# Patient Record
Sex: FEMALE | Race: WHITE | NOT HISPANIC OR LATINO | Employment: OTHER | URBAN - METROPOLITAN AREA
[De-identification: names, ages, dates, MRNs, and addresses within clinical notes are randomized per-mention and may not be internally consistent; named-entity substitution may affect disease eponyms.]

---

## 2017-02-15 RX ORDER — TRAZODONE HYDROCHLORIDE 50 MG/1
TABLET ORAL
Qty: 30 TABLET | Refills: 2 | Status: SHIPPED | OUTPATIENT
Start: 2017-02-15 | End: 2017-06-08

## 2017-03-20 RX ORDER — NAPROXEN 500 MG/1
TABLET ORAL
Qty: 30 TABLET | Refills: 6 | Status: SHIPPED | OUTPATIENT
Start: 2017-03-20 | End: 2017-06-08

## 2017-04-12 DIAGNOSIS — N95.2 ATROPHIC VAGINITIS: Primary | ICD-10-CM

## 2017-04-13 ENCOUNTER — TELEPHONE (OUTPATIENT)
Dept: INTERNAL MEDICINE | Facility: CLINIC | Age: 57
End: 2017-04-13

## 2017-04-13 NOTE — TELEPHONE ENCOUNTER
----- Message from León Sanabria MD sent at 4/12/2017  7:23 PM EDT -----  I changed her to premarin vaginal. Thanks.  ----- Message -----     From: Susan Hendrickson MA     Sent: 4/7/2017  11:01 AM       To: León Sanabria MD        ----- Message -----     From: Terrie Whitehead     Sent: 4/6/2017   9:36 AM       To: Joanie Arauz [address]    PATIENT NEEDS HER ESTRACE CHANGED TO PREMARINE DUE TO INSURANCE TO PAYING.  SHE CAN NOT AFFORD THE ESTRACE OUT OF POCKET.      [place] IN [place]  [phone]    LEFT MESSAGE FOR PT,

## 2017-04-17 ENCOUNTER — TELEPHONE (OUTPATIENT)
Dept: INTERNAL MEDICINE | Facility: CLINIC | Age: 57
End: 2017-04-17

## 2017-04-17 NOTE — TELEPHONE ENCOUNTER
Patient advised as per below.  Suggested that patient contact customer service with her insurance company to see if she can personally appeal to them for coverage of the vaginal cream.  Patient voiced understanding and states that she will attempt this.    ----- Message from Ynes Peña sent at 2017 11:34 AM EDT -----  Regarding: FW: SUBSTITUTE  Contact: [phone]      ----- Message -----     From: León Sanabria MD     Sent: 2017  11:22 AM       To: Susan Hendrickson MA  Subject: RE: SUBSTITUTE                                   It doesn't sound like her insurance is covering any estrogen vaginal cream, unfortunately.  The only option would be oral hormones, but I don't really want to do this, especially not without having a face to face discussion on risks and benefits.  Thanks.  ----- Message -----     From: Susan Hendrickson MA     Sent: 2017   4:34 PM       To: León Sanabria MD  Subject: FW: SUBSTITUTE                                       ----- Message -----     From: Melissa Medrano     Sent: 2017   4:23 PM       To: Susan Hendrickson MA  Subject: SUBSTITUTE                                       :  64  [place] PATIENT    PATIENT SAID THE PREMARIN VAGINAL CREAM, 0.625 MG/GN,  COST $300. SHE IS UNABLE TO PAY THIS. IS THERE A GENERIC FORM OF MED?

## 2017-05-08 ENCOUNTER — OFFICE VISIT (OUTPATIENT)
Dept: INTERNAL MEDICINE | Facility: CLINIC | Age: 57
End: 2017-05-08

## 2017-05-08 ENCOUNTER — HOSPITAL ENCOUNTER (OUTPATIENT)
Dept: GENERAL RADIOLOGY | Facility: HOSPITAL | Age: 57
Discharge: HOME | End: 2017-05-08
Attending: FAMILY MEDICINE | Admitting: FAMILY MEDICINE

## 2017-05-08 ENCOUNTER — TELEPHONE (OUTPATIENT)
Dept: INTERNAL MEDICINE | Facility: CLINIC | Age: 57
End: 2017-05-08

## 2017-05-08 VITALS
HEIGHT: 62 IN | DIASTOLIC BLOOD PRESSURE: 84 MMHG | WEIGHT: 170 LBS | TEMPERATURE: 98.7 F | HEART RATE: 80 BPM | SYSTOLIC BLOOD PRESSURE: 140 MMHG | BODY MASS INDEX: 31.28 KG/M2 | OXYGEN SATURATION: 97 %

## 2017-05-08 DIAGNOSIS — J01.00 ACUTE MAXILLARY SINUSITIS, RECURRENCE NOT SPECIFIED: ICD-10-CM

## 2017-05-08 DIAGNOSIS — M95.2 SKULL DEFECT: Primary | ICD-10-CM

## 2017-05-08 DIAGNOSIS — M95.2 SKULL DEFECT: ICD-10-CM

## 2017-05-08 PROCEDURE — 70260 X-RAY EXAM OF SKULL: CPT

## 2017-05-08 PROCEDURE — 99213 OFFICE O/P EST LOW 20 MIN: CPT | Performed by: FAMILY MEDICINE

## 2017-05-08 RX ORDER — AMOXICILLIN 875 MG/1
875 TABLET, COATED ORAL 2 TIMES DAILY
Qty: 20 TABLET | Refills: 0 | Status: SHIPPED | OUTPATIENT
Start: 2017-05-08 | End: 2017-06-08

## 2017-06-01 ENCOUNTER — TRANSCRIBE ORDERS (OUTPATIENT)
Dept: INTERNAL MEDICINE | Facility: CLINIC | Age: 57
End: 2017-06-01

## 2017-06-01 DIAGNOSIS — Z13.9 SCREENING: Primary | ICD-10-CM

## 2017-06-07 ENCOUNTER — HOSPITAL ENCOUNTER (OUTPATIENT)
Dept: MAMMOGRAPHY | Facility: HOSPITAL | Age: 57
Discharge: HOME | End: 2017-06-07
Attending: FAMILY MEDICINE | Admitting: FAMILY MEDICINE

## 2017-06-07 DIAGNOSIS — Z13.9 SCREENING: ICD-10-CM

## 2017-06-07 PROCEDURE — 77063 BREAST TOMOSYNTHESIS BI: CPT

## 2017-06-07 PROCEDURE — G0202 SCR MAMMO BI INCL CAD: HCPCS

## 2017-06-08 ENCOUNTER — OFFICE VISIT (OUTPATIENT)
Dept: INTERNAL MEDICINE | Facility: CLINIC | Age: 57
End: 2017-06-08

## 2017-06-08 VITALS
SYSTOLIC BLOOD PRESSURE: 140 MMHG | WEIGHT: 169 LBS | DIASTOLIC BLOOD PRESSURE: 80 MMHG | BODY MASS INDEX: 31.1 KG/M2 | TEMPERATURE: 97.9 F | HEIGHT: 62 IN | HEART RATE: 82 BPM

## 2017-06-08 DIAGNOSIS — N95.2 ATROPHIC VAGINITIS: ICD-10-CM

## 2017-06-08 DIAGNOSIS — R73.03 PREDIABETES: ICD-10-CM

## 2017-06-08 DIAGNOSIS — G47.00 INSOMNIA, UNSPECIFIED TYPE: ICD-10-CM

## 2017-06-08 DIAGNOSIS — F32.A DEPRESSION, UNSPECIFIED DEPRESSION TYPE: Primary | ICD-10-CM

## 2017-06-08 DIAGNOSIS — R91.8 PULMONARY NODULES: ICD-10-CM

## 2017-06-08 DIAGNOSIS — J30.9 ATOPIC RHINITIS: ICD-10-CM

## 2017-06-08 PROCEDURE — 99214 OFFICE O/P EST MOD 30 MIN: CPT | Performed by: FAMILY MEDICINE

## 2017-06-08 RX ORDER — TRAZODONE HYDROCHLORIDE 50 MG/1
TABLET ORAL
Qty: 30 TABLET | Refills: 6 | Status: SHIPPED | OUTPATIENT
Start: 2017-06-08 | End: 2018-07-08 | Stop reason: SDUPTHER

## 2017-06-08 RX ORDER — MOMETASONE FUROATE 50 UG/1
SPRAY, METERED NASAL
Qty: 17 G | Refills: 6 | Status: SHIPPED | OUTPATIENT
Start: 2017-06-08 | End: 2017-08-30

## 2017-06-08 RX ORDER — BUPROPION HYDROCHLORIDE 300 MG/1
TABLET ORAL
Qty: 30 TABLET | Refills: 6 | Status: SHIPPED | OUTPATIENT
Start: 2017-06-08 | End: 2018-01-03 | Stop reason: SDUPTHER

## 2017-06-08 RX ORDER — CHOLECALCIFEROL (VITAMIN D3) 125 MCG
1 CAPSULE ORAL DAILY
COMMUNITY
End: 2022-01-19

## 2017-06-08 RX ORDER — NAPROXEN 500 MG/1
TABLET ORAL
Qty: 60 TABLET | Refills: 0 | Status: SHIPPED | OUTPATIENT
Start: 2017-06-08 | End: 2017-08-30 | Stop reason: SDUPTHER

## 2017-06-08 NOTE — PROGRESS NOTES
Subjective     Lilliam Portillo is a 56 y.o. female, who presents with a chief complaint of   Chief Complaint   Patient presents with   • Diabetes   • Depression       Diabetes     Depression        1. Depression.  Pt reports she is doing well with current treatment.  Denies SI.    2. Insomnia.  Trazodone is helping.  Able to sleep well with this.    3. Atrophic vaginitis.  Pt reports that the estrogen cream has resolved her symptoms.  She had her mammogram yesterday, which was normal.    The following portions of the patient's history were reviewed and updated as appropriate: allergies, current medications, past family history, past medical history, past social history, past surgical history and problem list.    Allergies: Review of patient's allergies indicates no known allergies.    Review of Systems   Constitutional: Negative.    HENT: Negative.    Eyes: Negative.    Respiratory: Negative.    Cardiovascular: Negative.    Gastrointestinal: Negative.    Endocrine: Negative.    Genitourinary: Negative.    Musculoskeletal: Negative.    Skin: Negative.    Allergic/Immunologic: Positive for environmental allergies.   Neurological: Negative.    Hematological: Negative.    Psychiatric/Behavioral: Positive for sleep disturbance.       Objective     Wt Readings from Last 3 Encounters:   06/08/17 169 lb (76.7 kg)   05/08/17 170 lb (77.1 kg)   12/12/16 163 lb (73.9 kg)     Temp Readings from Last 3 Encounters:   06/08/17 97.9 °F (36.6 °C)   05/08/17 98.7 °F (37.1 °C)   12/12/16 98.1 °F (36.7 °C)     BP Readings from Last 3 Encounters:   06/08/17 140/80   05/08/17 140/84   12/12/16 100/60     Pulse Readings from Last 3 Encounters:   06/08/17 82   05/08/17 80   12/12/16 78     Body mass index is 30.91 kg/(m^2).    Physical Exam   Constitutional: She is oriented to person, place, and time. She appears well-developed and well-nourished.   HENT:   Head: Normocephalic.   Mouth/Throat: Oropharynx is clear and moist.   Eyes:  Conjunctivae and EOM are normal. Pupils are equal, round, and reactive to light.   Neck: Normal range of motion. Neck supple. No thyromegaly present.   Cardiovascular: Normal rate, regular rhythm and normal heart sounds.    Pulmonary/Chest: Effort normal and breath sounds normal.   Abdominal: Soft. Bowel sounds are normal. There is no hepatosplenomegaly.   Musculoskeletal: Normal range of motion. She exhibits no edema.   Lymphadenopathy:     She has no cervical adenopathy.   Neurological: She is alert and oriented to person, place, and time.   Skin: Skin is warm and dry. No rash noted.   Psychiatric: She has a normal mood and affect. Her behavior is normal.   Vitals reviewed.          Assessment/Plan   Lilliam was seen today for diabetes and depression.    Diagnoses and all orders for this visit:    Depression, unspecified depression type    Insomnia, unspecified type    Atopic rhinitis  -     ciclesonide (OMNARIS) 50 MCG/ACT nasal spray; 2 sprays by Each Nare route Daily.    Atrophic vaginitis    Prediabetes    Pulmonary nodules    1. Depression.  Controlled.  Continue same.    2. Insomnia.  Benefiting from trazodone.  Continue same.    3. Allergic rhinitis. Continue nasal steroid.  The Nasonex doesn't work as well as Omnaris.  She has been on Flonase as well; Omnaris was the most effective for her.    4. Atrophic vaginitis.  Improved with vaginal estrogen.  Mammogram UTD.    5. Prediabetes.  Labs reviewed, which she brought in from her anti-aging/hormone physician, Dr. Shelton. A1c 5.8.  Continue lifestyle measures.    6. Pulmonary nodules.  Stable.  Repeat CT scan due 11/2017.      Current Outpatient Prescriptions:   •  albuterol (PROVENTIL) (2.5 MG/3ML) 0.083% nebulizer solution, Take 2.5 mg by nebulization every 4 (four) hours as needed for wheezing., Disp: 30 vial, Rfl: 12  •  buPROPion XL (WELLBUTRIN XL) 300 MG 24 hr tablet, TAKE 1 TABLET BY MOUTH EVERY DAY, Disp: 30 tablet, Rfl: 6  •  Cholecalciferol (VITAMIN  D3) 2000 UNITS tablet, Take 1 tablet by mouth Daily., Disp: , Rfl:   •  conjugated estrogens (PREMARIN) 0.625 MG/GM vaginal cream, 0.5 grams PV twice weekly., Disp: 30 g, Rfl: 3  •  cyclobenzaprine (FLEXERIL) 5 MG tablet, TAKE 1 TABLET BY MOUTH THREE TIMES DAILY AS NEEDED FOR MUSCLE SPASMS, Disp: 30 tablet, Rfl: 5  •  mometasone (NASONEX) 50 MCG/ACT nasal spray, SPRAY 2 SPRAYS IN EACH NOSTRIL DAILY, Disp: 17 g, Rfl: 6  •  Multiple Vitamin (MULTI VITAMIN) tablet, Take  by mouth Daily., Disp: , Rfl:   •  naproxen (NAPROSYN) 500 MG tablet, TAKE 1 TABLET BY MOUTH TWICE DAILY WITH FOOD, Disp: 60 tablet, Rfl: 0  •  Nutritional Supplements (DHEA PO), Take 1 tablet by mouth Daily., Disp: , Rfl:   •  ciclesonide (OMNARIS) 50 MCG/ACT nasal spray, 2 sprays by Each Nare route Daily., Disp: 1 each, Rfl: 11  •  traZODone (DESYREL) 50 MG tablet, TAKE 1 TABLET BY MOUTH EVERY NIGHT AT BEDTIME AS NEEDED, Disp: 30 tablet, Rfl: 6  Medications Discontinued During This Encounter   Medication Reason   • amoxicillin (AMOXIL) 875 MG tablet Therapy completed   • naproxen (NAPROSYN) 500 MG tablet Therapy completed   • traZODone (DESYREL) 50 MG tablet Therapy completed       Return in about 6 months (around 12/8/2017).

## 2017-08-30 ENCOUNTER — OFFICE VISIT (OUTPATIENT)
Dept: INTERNAL MEDICINE | Facility: CLINIC | Age: 57
End: 2017-08-30

## 2017-08-30 VITALS
HEIGHT: 62 IN | HEART RATE: 76 BPM | TEMPERATURE: 98.4 F | OXYGEN SATURATION: 99 % | BODY MASS INDEX: 32.2 KG/M2 | WEIGHT: 175 LBS | DIASTOLIC BLOOD PRESSURE: 70 MMHG | SYSTOLIC BLOOD PRESSURE: 120 MMHG

## 2017-08-30 DIAGNOSIS — M54.16 LUMBAR RADICULOPATHY, ACUTE: Primary | ICD-10-CM

## 2017-08-30 DIAGNOSIS — M62.838 NECK MUSCLE SPASM: ICD-10-CM

## 2017-08-30 DIAGNOSIS — J45.20 MILD INTERMITTENT ASTHMA WITHOUT COMPLICATION: ICD-10-CM

## 2017-08-30 PROCEDURE — 99213 OFFICE O/P EST LOW 20 MIN: CPT | Performed by: FAMILY MEDICINE

## 2017-08-30 RX ORDER — PREDNISONE 10 MG/1
40 TABLET ORAL DAILY
Qty: 20 TABLET | Refills: 0 | Status: SHIPPED | OUTPATIENT
Start: 2017-08-30 | End: 2017-09-04

## 2017-08-30 RX ORDER — ALBUTEROL SULFATE 2.5 MG/3ML
SOLUTION RESPIRATORY (INHALATION)
Qty: 75 ML | Refills: 6 | Status: SHIPPED | OUTPATIENT
Start: 2017-08-30 | End: 2017-12-14

## 2017-08-30 RX ORDER — NAPROXEN 500 MG/1
500 TABLET ORAL 2 TIMES DAILY WITH MEALS
Qty: 60 TABLET | Refills: 0 | Status: SHIPPED | OUTPATIENT
Start: 2017-08-30 | End: 2017-10-08 | Stop reason: SDUPTHER

## 2017-08-30 RX ORDER — HYDROCODONE BITARTRATE AND ACETAMINOPHEN 5; 325 MG/1; MG/1
1-2 TABLET ORAL NIGHTLY PRN
Qty: 20 TABLET | Refills: 0 | Status: SHIPPED | OUTPATIENT
Start: 2017-08-30 | End: 2019-02-04

## 2017-08-30 RX ORDER — BACLOFEN 10 MG/1
10 TABLET ORAL 3 TIMES DAILY PRN
Qty: 30 TABLET | Refills: 0 | Status: SHIPPED | OUTPATIENT
Start: 2017-08-30 | End: 2017-09-06 | Stop reason: SDUPTHER

## 2017-08-30 NOTE — PROGRESS NOTES
Subjective     Lilliam Portillo is a 57 y.o. female, who presents with a chief complaint of   Chief Complaint   Patient presents with   • Hip Pain     Pain for a week that radiates into right foot       HPI     Pt presents with the complaint of one week of low back pain, radiating into the lateral side of the right leg, down the calf, and into the right foot. There is some tingling on the outside of the foot.  Denies weakness.  Denies bowel/bladder changes.  She has had back pain in the past and has done physical therapy.  She is doing her PT exercises but feels that they are aggravating the pain.  She is taking naproxen and cyclobenzaprine with minimal improvement.  She is having a hard time sleeping at night.    The following portions of the patient's history were reviewed and updated as appropriate: allergies, current medications, past family history, past medical history, past social history, past surgical history and problem list.     Allergies: Review of patient's allergies indicates no known allergies.    Review of Systems   Constitutional: Negative.    HENT: Negative.    Eyes: Negative.    Respiratory: Negative.    Cardiovascular: Negative.    Gastrointestinal: Negative.    Endocrine: Negative.    Genitourinary: Negative.    Musculoskeletal: Positive for back pain and myalgias. Negative for joint swelling.   Neurological: Positive for numbness. Negative for weakness.   Hematological: Negative.    Psychiatric/Behavioral: Positive for sleep disturbance.       Objective     Wt Readings from Last 3 Encounters:   08/30/17 175 lb (79.4 kg)   06/08/17 169 lb (76.7 kg)   05/08/17 170 lb (77.1 kg)     Temp Readings from Last 3 Encounters:   08/30/17 98.4 °F (36.9 °C)   06/08/17 97.9 °F (36.6 °C)   05/08/17 98.7 °F (37.1 °C)     BP Readings from Last 3 Encounters:   08/30/17 120/70   06/08/17 140/80   05/08/17 140/84     Pulse Readings from Last 3 Encounters:   08/30/17 76   06/08/17 82   05/08/17 80     Body mass index  is 32.01 kg/(m^2).  SpO2 Readings from Last 3 Encounters:   08/30/17 99%   05/08/17 97%   12/12/16 98%       Physical Exam   Constitutional: She appears well-developed and well-nourished. No distress.   HENT:   Head: Normocephalic.   Eyes: Conjunctivae are normal.   Neck: Neck supple.   Pulmonary/Chest: Effort normal.   Musculoskeletal:        Lumbar back: She exhibits decreased range of motion (decreased extension) and pain. She exhibits no tenderness, no bony tenderness, no swelling, no edema and no deformity.   Neurological: She is alert. She has normal strength and normal reflexes. She displays normal reflexes. She exhibits normal muscle tone. Coordination and gait normal.   Skin: Skin is warm and dry. No rash noted. She is not diaphoretic.   Psychiatric: She has a normal mood and affect. Her behavior is normal.   Nursing note and vitals reviewed.        Assessment/Plan   Lilliam was seen today for hip pain.    Diagnoses and all orders for this visit:    Lumbar radiculopathy, acute  -     naproxen (NAPROSYN) 500 MG tablet; Take 1 tablet by mouth 2 (Two) Times a Day With Meals.  -     predniSONE (DELTASONE) 10 MG tablet; Take 4 tablets by mouth Daily for 5 days.  -     baclofen (LIORESAL) 10 MG tablet; Take 1 tablet by mouth 3 (Three) Times a Day As Needed for Muscle Spasms.  -     Ambulatory Referral to Physical Therapy Evaluate and treat  -     HYDROcodone-acetaminophen (NORCO) 5-325 MG per tablet; Take 1-2 tablets by mouth At Night As Needed for Moderate Pain  or Severe Pain .      NSAIDs, change muscle relaxant, 5 day course of oral steroids, start physical therapy.  F/U 2 weeks.  May need MRI.  Warning s/sxs and anticipatory guidance discussed.        Outpatient Medications Prior to Visit   Medication Sig Dispense Refill   • albuterol (PROVENTIL) (2.5 MG/3ML) 0.083% nebulizer solution INHALE CONTENTS OF 1 VIAL BY MOUTH VIA NEBULIZER EVERY 4 HOURS AS NEEDED FOR WHEEZING 75 mL 6   • buPROPion XL (WELLBUTRIN XL)  300 MG 24 hr tablet TAKE 1 TABLET BY MOUTH EVERY DAY 30 tablet 6   • Cholecalciferol (VITAMIN D3) 2000 UNITS tablet Take 1 tablet by mouth Daily.     • ciclesonide (OMNARIS) 50 MCG/ACT nasal spray 2 sprays by Each Nare route Daily. 1 each 11   • conjugated estrogens (PREMARIN) 0.625 MG/GM vaginal cream 0.5 grams PV twice weekly. 30 g 3   • Multiple Vitamin (MULTI VITAMIN) tablet Take  by mouth Daily.     • Nutritional Supplements (DHEA PO) Take 1 tablet by mouth Daily.     • traZODone (DESYREL) 50 MG tablet TAKE 1 TABLET BY MOUTH EVERY NIGHT AT BEDTIME AS NEEDED 30 tablet 6   • cyclobenzaprine (FLEXERIL) 5 MG tablet TAKE 1 TABLET BY MOUTH THREE TIMES DAILY AS NEEDED FOR MUSCLE SPASMS 30 tablet 5   • mometasone (NASONEX) 50 MCG/ACT nasal spray SPRAY 2 SPRAYS IN EACH NOSTRIL DAILY 17 g 6   • naproxen (NAPROSYN) 500 MG tablet TAKE 1 TABLET BY MOUTH TWICE DAILY WITH FOOD 60 tablet 0   • albuterol (PROVENTIL) (2.5 MG/3ML) 0.083% nebulizer solution Take 2.5 mg by nebulization every 4 (four) hours as needed for wheezing. 30 vial 12     No facility-administered medications prior to visit.      New Medications Ordered This Visit   Medications   • naproxen (NAPROSYN) 500 MG tablet     Sig: Take 1 tablet by mouth 2 (Two) Times a Day With Meals.     Dispense:  60 tablet     Refill:  0   • predniSONE (DELTASONE) 10 MG tablet     Sig: Take 4 tablets by mouth Daily for 5 days.     Dispense:  20 tablet     Refill:  0   • baclofen (LIORESAL) 10 MG tablet     Sig: Take 1 tablet by mouth 3 (Three) Times a Day As Needed for Muscle Spasms.     Dispense:  30 tablet     Refill:  0   • HYDROcodone-acetaminophen (NORCO) 5-325 MG per tablet     Sig: Take 1-2 tablets by mouth At Night As Needed for Moderate Pain  or Severe Pain .     Dispense:  20 tablet     Refill:  0     [unfilled]  Medications Discontinued During This Encounter   Medication Reason   • mometasone (NASONEX) 50 MCG/ACT nasal spray Therapy completed   • cyclobenzaprine  (FLEXERIL) 5 MG tablet    • naproxen (NAPROSYN) 500 MG tablet Reorder         Return in about 2 weeks (around 9/13/2017).

## 2017-09-06 ENCOUNTER — HOSPITAL ENCOUNTER (OUTPATIENT)
Dept: PHYSICAL THERAPY | Facility: HOSPITAL | Age: 57
Setting detail: THERAPIES SERIES
Discharge: HOME | End: 2017-09-06

## 2017-09-06 DIAGNOSIS — M54.16 LUMBAR RADICULOPATHY, ACUTE: ICD-10-CM

## 2017-09-06 DIAGNOSIS — M54.16 LUMBAR RADICULOPATHY: Primary | ICD-10-CM

## 2017-09-06 PROCEDURE — 97163 PT EVAL HIGH COMPLEX 45 MIN: CPT

## 2017-09-06 RX ORDER — BACLOFEN 10 MG/1
TABLET ORAL
Qty: 30 TABLET | Refills: 0 | Status: SHIPPED | OUTPATIENT
Start: 2017-09-06 | End: 2017-09-18 | Stop reason: SDUPTHER

## 2017-09-06 NOTE — THERAPY EVALUATION
Outpatient Physical Therapy Ortho Initial Evaluation  ERIN Orozco     Patient Name: Lilliam Portillo  : 1960  MRN: [id number]  Today's Date: 2017      Visit Date: 2017    Patient Active Problem List   Diagnosis   • Atopic rhinitis   • Bronchitis   • Degeneration of intervertebral disc of cervical region   • Degeneration of intervertebral disc of lumbar region   • Depression   • Insomnia   • Atrophic vaginitis   • Prediabetes   • Diverticulitis of colon   • Pulmonary nodules   • History of diabetes mellitus resolved following bariatric surgery   • History of bariatric surgery   • Diverticulosis   • Acute cystitis   • Acute maxillary sinusitis   • Skull defect   • Lumbar radiculopathy, acute        Past Medical History:   Diagnosis Date   • Depression         Past Surgical History:   Procedure Laterality Date   • CARPAL TUNNEL RELEASE     •  SECTION     • FOOT SURGERY     • HYSTERECTOMY     • SHOULDER SURGERY     • TRIGGER FINGER RELEASE         Visit Dx:     ICD-10-CM ICD-9-CM   1. Lumbar radiculopathy M54.16 724.4             Patient History       17 1200          History    Chief Complaint Difficulty Walking;Difficulty with daily activities;Muscle tenderness;Muscle weakness;Numbness;Pain;Tingling  -AS      Type of Pain Back pain;Lower Extremity / Leg   RLE  -AS      Date Current Problem(s) Began 17  -AS      Brief Description of Current Complaint Patient states that about 2 weeks ago she began having low back pain and numbness and tingling into her right LE to her foot. She states she has not had an MRI or x-rays. She has been given pain meds by her MD that she states have helped some. Her pain is increased with lying supine, lying prone, or sitting. She gets some relief with left sidelying or when standing on her RLE.  -AS      Onset Date- PT 17  -AS      Patient/Caregiver Goals Relieve pain;Return to prior level of function;Improve mobility;Improve strength  -AS       Patient's Rating of General Health Good  -AS      Occupation/sports/leisure activities [employer]   -AS      How has patient tried to help current problem? Heat  -AS      Pain     Pain Location Back;Leg  -AS      Pain at Present 6  -AS      Pain at Best 4  -AS      Pain at Worst 10  -AS      Pain Frequency Constant/continuous  -AS      Pain Description Aching;Numbness;Tingling  -AS      Daily Activities    Primary Language English  -AS      Are you able to read Yes  -AS      Are you able to write Yes  -AS      How does patient learn best? Listening;Reading  -AS      Teaching needs identified Home Exercise Program;Management of Condition  -AS      Patient is concerned about/has problems with Difficulty with self care (i.e. bathing, dressing, toileting:;Performing home management (household chores, shopping, care of dependents);Performing job responsibilities/community activities (work, school,;Performing sports, recreation, and play activities;Standing;Walking;Sitting  -AS      Does patient have problems with the following? None  -AS      Barriers to learning None  -AS      Pt Participated in POC and Goals Yes  -AS      Safety    Are you being hurt, hit, or frightened by anyone at home or in your life? No  -AS      Are you being neglected by a caregiver No  -AS        User Key  (r) = Recorded By, (t) = Taken By, (c) = Cosigned By    Initials Name Provider Type    AS Robinson Mcgovern, PT Physical Therapist                PT Ortho       09/06/17 1200    Precautions and Contraindications    Precautions/Limitations no known precautions/limitations  -AS    Posture/Observations    Alignment Options Lumbar lordosis  -AS    Lumbar lordosis Right:;Mild;Decreased  -AS    Lumbosacral Palpation    SI Right:;Tender  -AS    Piriformis Right:;Tender  -AS    Erector Spinae (Paraspinals) Right:;Tender  -AS    Lumbar/SI Special Tests    Slump Test (Neural Tension) Right:;Positive  -AS    SLR (Neural Tension)  Right:;Positive  -AS    Trunk    Flexion AROM WNL (0-80 degrees)  -AS    Extension AROM Deficit 100% limited  -AS    Lt Lat Flexion AROM Deficit 75% limited  -AS    Right Lateral Flexion AROM WNL (0-35 degrees)  -AS    Lt Rotation AROM Deficit 75% limited  -AS    Right Rotation AROM WNL (0-45 degrees)  -AS    Trunk    Trunk Flexion Gross Movement (3+/5) fair plus  -AS    Trunk Extension Gross Movement (3+/5) fair plus  -AS    Left Hip    Hip Extension Gross Movement (3+/5) fair plus  -AS    Hip ABduction Gross Movement (3+/5) fair plus  -AS    Right Hip    Hip Extension Gross Movement (3+/5) fair plus  -AS    Hip ABduction Gross Movement (3+/5) fair plus  -AS    Lower Extremity Flexibility    Hamstrings Right:;Mildly limited  -AS    Hip External Rotators Right:;Mildly limited  -AS      User Key  (r) = Recorded By, (t) = Taken By, (c) = Cosigned By    Initials Name Provider Type    AS Robinson Mcgovern, PT Physical Therapist                            Therapy Education       09/06/17 1250          Therapy Education    Given HEP;Pain management  -AS      Program New  -AS      How Provided Verbal;Demonstration;Written  -AS      Provided to Patient  -AS      Level of Understanding Teach back education performed;Verbalized;Demonstrated  -AS        User Key  (r) = Recorded By, (t) = Taken By, (c) = Cosigned By    Initials Name Provider Type    AS Robinson Mcgovern, PT Physical Therapist                PT OP Goals       09/06/17 1200       PT Short Term Goals    STG Date to Achieve 09/20/17  -AS     STG 1 Patient to be compliant with her initial HEP.  -AS     STG 2 Patient to improve her bilateral hip extensiona nd abduction strength to 4-/5.   -AS     STG 3 Patient to improve her trunk flexion and extension strength to 4-/5.  -AS     STG 4 Patient to report improved radicular symptomsin RLE by 25-50%.  -AS     STG 5 Patient to report worst pain on VAS of 4-5/10 with lying supine, prone, or sitting.  -AS     Long  Term Goals    LTG Date to Achieve 10/04/17  -AS     LTG 1 Patient to be compliant with her advanced HEP.  -AS     LTG 2 Patient to improve her bilateral hip extensiona nd abduction strength to 4/5.   -AS     LTG 3 Patient to improve her trunk flexion and extension strength to 4/5.  -AS     LTG 4 Patient to report improved radicular symptomsin RLE by 50-75%.  -AS     LTG 5 Patient to report worst pain on VAS of 2-3/10 with lying supine, prone, or sitting.  -AS     LTG 6 Patient to report improved function and decreased pain on Back Index by >10-15 points.  -AS     Time Calculation    PT Goal Re-Cert Due Date 10/04/17  -AS       User Key  (r) = Recorded By, (t) = Taken By, (c) = Cosigned By    Initials Name Provider Type    AS Robinson Mcgovern, PT Physical Therapist                PT Assessment/Plan       09/06/17 1200       PT Assessment    Functional Limitations Impaired gait;Impaired locomotion;Limitation in home management;Limitations in community activities;Performance in leisure activities;Performance in self-care ADL;Performance in sport activities;Performance in work activities  -AS     Impairments Gait;Impaired flexibility;Muscle strength;Pain;Range of motion  -AS     Assessment Comments Patient reports to outpatient PT with complaints of low back pain with radicular symptoms shireen her RLE to her foot. Patient has decreased bilateral hip extension and abduction strength, decreased trunk flexion and extension strength, and increased low back and RLE pain. Patient has decreased function due to the above. Patient was unable to tolerate much treatment today, including supine lumbar traction. Patient did report relief in her symptoms with manual LE distraction and left sidelying positional distraction.  -AS     Please refer to paper survey for additional self-reported information Yes  -AS     Rehab Potential Good  -AS     Patient/caregiver participated in establishment of treatment plan and goals Yes  -AS      Patient would benefit from skilled therapy intervention Yes  -AS     PT Plan    PT Frequency 2x/week  -AS     Predicted Duration of Therapy Intervention (days/wks) 2-4 weeks  -AS     Planned CPT's? PT RE-EVAL: 59868;PT THER PROC EA 15 MIN: 67443;PT THER ACT EA 15 MIN: 50631;PT MANUAL THERAPY EA 15 MIN: 90373;PT HOT OR COLD PACK TREAT [facility];PT ELECTRICAL STIM UNATTEND: ;PT ULTRASOUND EA 15 MIN: 87187;PT TRACTION LUMBAR: 36145  -AS       User Key  (r) = Recorded By, (t) = Taken By, (c) = Cosigned By    Initials Name Provider Type    AS Robinson Mcgovern PT Physical Therapist                  Exercises       09/06/17 1200          Subjective Pain    Able to rate subjective pain? yes  -AS      Pre-Treatment Pain Level 6  -AS      Post-Treatment Pain Level 5  -AS      Exercise 1    Exercise Name 1 Seated HS Stretch  -AS      Reps 1 10  -AS      Time (Seconds) 1 10 sec hold  -AS      Exercise 2    Exercise Name 2 Seated Piriformis Stretch  -AS      Reps 2 10  -AS      Time (Seconds) 2 10 sec hold  -AS      Exercise 3    Exercise Name 3 Left Positional Distraction  -AS      Time (Minutes) 3 3 min  -AS      Exercise 4    Exercise Name 4 Manual LE distraction  -AS      Reps 4 20  -AS      Time (Seconds) 4 5 sec holds  -AS        User Key  (r) = Recorded By, (t) = Taken By, (c) = Cosigned By    Initials Name Provider Type    AS Robinson Mcgovern PT Physical Therapist                              Outcome Measures       09/06/17 1200          Other Outcome Measure Tool Used    Other Outcome Measure Tool Comments Back Index - 31  -AS      Functional Assessment    Outcome Measure Options Other Outcome Measure  -AS        User Key  (r) = Recorded By, (t) = Taken By, (c) = Cosigned By    Initials Name Provider Type    AS Robinson Mcgovern PT Physical Therapist            Time Calculation:   Start Time: 1153  Stop Time: 1242  Time Calculation (min): 49 min     Therapy Charges for Today     Code Description Service  Date Service Provider Modifiers Qty    [id number] HC PT EVAL HIGH COMPLEXITY 4 9/6/2017 Robinson Mcgovern, PT GP 1          PT G-Codes  Outcome Measure Options: Other Outcome Measure         Robinson Mcgovern, PT  9/6/2017

## 2017-09-07 ENCOUNTER — HOSPITAL ENCOUNTER (OUTPATIENT)
Dept: PHYSICAL THERAPY | Facility: HOSPITAL | Age: 57
Setting detail: THERAPIES SERIES
Discharge: HOME | End: 2017-09-07

## 2017-09-07 DIAGNOSIS — M54.16 LUMBAR RADICULOPATHY: Primary | ICD-10-CM

## 2017-09-07 PROCEDURE — 97140 MANUAL THERAPY 1/> REGIONS: CPT

## 2017-09-07 PROCEDURE — G0283 ELEC STIM OTHER THAN WOUND: HCPCS

## 2017-09-07 NOTE — THERAPY TREATMENT NOTE
Outpatient Physical Therapy Ortho Treatment Note  ERIN Orozco     Patient Name: Lilliam Portillo  : 1960  MRN: [id number]  Today's Date: 2017      Visit Date: 2017    Visit Dx:    ICD-10-CM ICD-9-CM   1. Lumbar radiculopathy M54.16 724.4       Patient Active Problem List   Diagnosis   • Atopic rhinitis   • Bronchitis   • Degeneration of intervertebral disc of cervical region   • Degeneration of intervertebral disc of lumbar region   • Depression   • Insomnia   • Atrophic vaginitis   • Prediabetes   • Diverticulitis of colon   • Pulmonary nodules   • History of diabetes mellitus resolved following bariatric surgery   • History of bariatric surgery   • Diverticulosis   • Acute cystitis   • Acute maxillary sinusitis   • Skull defect   • Lumbar radiculopathy, acute        Past Medical History:   Diagnosis Date   • Depression         Past Surgical History:   Procedure Laterality Date   • CARPAL TUNNEL RELEASE     •  SECTION     • FOOT SURGERY     • HYSTERECTOMY     • SHOULDER SURGERY     • TRIGGER FINGER RELEASE               PT Ortho       17 1200    Precautions and Contraindications    Precautions/Limitations no known precautions/limitations  -AS    Posture/Observations    Alignment Options Lumbar lordosis  -AS    Lumbar lordosis Right:;Mild;Decreased  -AS    Lumbosacral Palpation    SI Right:;Tender  -AS    Piriformis Right:;Tender  -AS    Erector Spinae (Paraspinals) Right:;Tender  -AS    Lumbar/SI Special Tests    Slump Test (Neural Tension) Right:;Positive  -AS    SLR (Neural Tension) Right:;Positive  -AS    Trunk    Flexion AROM WNL (0-80 degrees)  -AS    Extension AROM Deficit 100% limited  -AS    Lt Lat Flexion AROM Deficit 75% limited  -AS    Right Lateral Flexion AROM WNL (0-35 degrees)  -AS    Lt Rotation AROM Deficit 75% limited  -AS    Right Rotation AROM WNL (0-45 degrees)  -AS    Trunk    Trunk Flexion Gross Movement (3+/5) fair plus  -AS    Trunk Extension Gross Movement  (3+/5) fair plus  -AS    Left Hip    Hip Extension Gross Movement (3+/5) fair plus  -AS    Hip ABduction Gross Movement (3+/5) fair plus  -AS    Right Hip    Hip Extension Gross Movement (3+/5) fair plus  -AS    Hip ABduction Gross Movement (3+/5) fair plus  -AS    Lower Extremity Flexibility    Hamstrings Right:;Mildly limited  -AS    Hip External Rotators Right:;Mildly limited  -AS      User Key  (r) = Recorded By, (t) = Taken By, (c) = Cosigned By    Initials Name Provider Type    AS Robinson Mcgovern, PT Physical Therapist                            PT Assessment/Plan       09/07/17 0700 09/06/17 1200    PT Assessment    Functional Limitations  Impaired gait;Impaired locomotion;Limitation in home management;Limitations in community activities;Performance in leisure activities;Performance in self-care ADL;Performance in sport activities;Performance in work activities  -AS    Impairments  Gait;Impaired flexibility;Muscle strength;Pain;Range of motion  -AS    Assessment Comments Patient continues to not toleratee many exercises. She did tolerate prone RLE manual distraction with and without prone press ups onto her elbows. Plan to initiate prone lumbar traction at her next treatment session.  -AS Patient reports to outpatient PT with complaints of low back pain with radicular symptoms shireen her RLE to her foot. Patient has decreased bilateral hip extension and abduction strength, decreased trunk flexion and extension strength, and increased low back and RLE pain. Patient has decreased function due to the above. Patient was unable to tolerate much treatment today, including supine lumbar traction. Patient did report relief in her symptoms with manual LE distraction and left sidelying positional distraction.  -AS    Please refer to paper survey for additional self-reported information  Yes  -AS    Rehab Potential  Good  -AS    Patient/caregiver participated in establishment of treatment plan and goals  Yes  -AS     Patient would benefit from skilled therapy intervention  Yes  -AS    PT Plan    PT Frequency  2x/week  -AS    Predicted Duration of Therapy Intervention (days/wks)  2-4 weeks  -AS    Planned CPT's?  PT RE-EVAL: 44403;PT THER PROC EA 15 MIN: 89763;PT THER ACT EA 15 MIN: 23953;PT MANUAL THERAPY EA 15 MIN: 12962;PT HOT OR COLD PACK TREAT MCARE;PT ELECTRICAL STIM UNATTEND: ;PT ULTRASOUND EA 15 MIN: 03728;PT TRACTION LUMBAR: 62847  -AS    PT Plan Comments Continue with current treatment plan as tolerated by the patient.  -AS       User Key  (r) = Recorded By, (t) = Taken By, (c) = Cosigned By    Initials Name Provider Type    AS Robinson Mcgovern, PT Physical Therapist                Modalities       09/07/17 0700          Moist Heat    MH Applied Yes  -AS      Location Lumbar    patient seated  -AS      Rx Minutes 15 mins  -AS      ELECTRICAL STIMULATION    Attended/Unattended Unattended  -AS      Stimulation Type IFC  -AS      Location/Electrode Placement/Other Lumbar  -AS      Rx Minutes 15 mins  -AS        User Key  (r) = Recorded By, (t) = Taken By, (c) = Cosigned By    Initials Name Provider Type    AS Robinson Mcgovern, PT Physical Therapist                Exercises       09/07/17 0700 09/06/17 1200       Subjective Comments    Subjective Comments Patient states she is feeling about the same this morning.  -AS      Subjective Pain    Able to rate subjective pain?  yes  -AS     Pre-Treatment Pain Level  6  -AS     Post-Treatment Pain Level  5  -AS     Exercise 1    Exercise Name 1 Seated HS Stretch  -AS Seated HS Stretch  -AS     Reps 1 10  -AS 10  -AS     Time (Seconds) 1 10 sec hold  -AS 10 sec hold  -AS     Exercise 2    Exercise Name 2 Seated Piriformis Stretch  -AS Seated Piriformis Stretch  -AS     Reps 2 10  -AS 10  -AS     Time (Seconds) 2 10 sec hold  -AS 10 sec hold  -AS     Exercise 3    Exercise Name 3 Left Positional Distraction  -AS Left Positional Distraction  -AS     Time (Minutes) 3 3  min  -AS 3 min  -AS     Exercise 4    Exercise Name 4 Manual LE distraction  -AS Manual LE distraction  -AS     Reps 4 20  -AS 20  -AS     Time (Seconds) 4 5 sec holds  -AS 5 sec holds  -AS     Exercise 5    Exercise Name 5 RLE distraction with prone ups onto elbows  -AS      Reps 5 10  -AS      Time (Seconds) 5 3 sec holds  -AS      Exercise 6    Exercise Name 6 RLE distraction without press ups  -AS      Reps 6 10  -AS      Time (Seconds) 6 3 sec holds  -AS        User Key  (r) = Recorded By, (t) = Taken By, (c) = Cosigned By    Initials Name Provider Type    AS Robinson Mcgovern, PT Physical Therapist                               PT OP Goals       09/06/17 1200       PT Short Term Goals    STG Date to Achieve 09/20/17  -AS     STG 1 Patient to be compliant with her initial HEP.  -AS     STG 2 Patient to improve her bilateral hip extensiona nd abduction strength to 4-/5.   -AS     STG 3 Patient to improve her trunk flexion and extension strength to 4-/5.  -AS     STG 4 Patient to report improved radicular symptomsin RLE by 25-50%.  -AS     STG 5 Patient to report worst pain on VAS of 4-5/10 with lying supine, prone, or sitting.  -AS     Long Term Goals    LTG Date to Achieve 10/04/17  -AS     LTG 1 Patient to be compliant with her advanced HEP.  -AS     LTG 2 Patient to improve her bilateral hip extensiona nd abduction strength to 4/5.   -AS     LTG 3 Patient to improve her trunk flexion and extension strength to 4/5.  -AS     LTG 4 Patient to report improved radicular symptomsin RLE by 50-75%.  -AS     LTG 5 Patient to report worst pain on VAS of 2-3/10 with lying supine, prone, or sitting.  -AS     LTG 6 Patient to report improved function and decreased pain on Back Index by >10-15 points.  -AS     Time Calculation    PT Goal Re-Cert Due Date 10/04/17  -AS       User Key  (r) = Recorded By, (t) = Taken By, (c) = Cosigned By    Initials Name Provider Type    AS Robinson Mcgovern, PT Physical Therapist                 Therapy Education       09/06/17 1250          Therapy Education    Given HEP;Pain management  -AS      Program New  -AS      How Provided Verbal;Demonstration;Written  -AS      Provided to Patient  -AS      Level of Understanding Teach back education performed;Verbalized;Demonstrated  -AS        User Key  (r) = Recorded By, (t) = Taken By, (c) = Cosigned By    Initials Name Provider Type    AS Robinson Mcgovern, PT Physical Therapist                Outcome Measures       09/06/17 1200          Other Outcome Measure Tool Used    Other Outcome Measure Tool Comments Back Index - 31  -AS      Functional Assessment    Outcome Measure Options Other Outcome Measure  -AS        User Key  (r) = Recorded By, (t) = Taken By, (c) = Cosigned By    Initials Name Provider Type    AS Robinson Mcgovern PT Physical Therapist            Time Calculation:   Start Time: 0700  Stop Time: 0748  Time Calculation (min): 48 min    Therapy Charges for Today     Code Description Service Date Service Provider Modifiers Qty    60816330669  PT MANUAL THERAPY EA 15 MIN 9/7/2017 Robinson Mcgovern, PT GP 1    [id number]  PT ELECTRICAL STIM UNATTENDED 9/7/2017 Robinson Mcgovern, PT  1                    Robinson Mcgovern, PT  9/7/2017

## 2017-09-11 ENCOUNTER — HOSPITAL ENCOUNTER (OUTPATIENT)
Dept: PHYSICAL THERAPY | Facility: HOSPITAL | Age: 57
Setting detail: THERAPIES SERIES
Discharge: HOME | End: 2017-09-11

## 2017-09-11 DIAGNOSIS — M54.16 LUMBAR RADICULOPATHY: Primary | ICD-10-CM

## 2017-09-11 PROCEDURE — 97012 MECHANICAL TRACTION THERAPY: CPT

## 2017-09-11 PROCEDURE — G0283 ELEC STIM OTHER THAN WOUND: HCPCS

## 2017-09-11 NOTE — THERAPY TREATMENT NOTE
Outpatient Physical Therapy Ortho Treatment Note  ERIN Orozco     Patient Name: Lilliam Portillo  : 1960  MRN: [id number]  Today's Date: 2017      Visit Date: 2017    Visit Dx:    ICD-10-CM ICD-9-CM   1. Lumbar radiculopathy M54.16 724.4       Patient Active Problem List   Diagnosis   • Atopic rhinitis   • Bronchitis   • Degeneration of intervertebral disc of cervical region   • Degeneration of intervertebral disc of lumbar region   • Depression   • Insomnia   • Atrophic vaginitis   • Prediabetes   • Diverticulitis of colon   • Pulmonary nodules   • History of diabetes mellitus resolved following bariatric surgery   • History of bariatric surgery   • Diverticulosis   • Acute cystitis   • Acute maxillary sinusitis   • Skull defect   • Lumbar radiculopathy, acute        Past Medical History:   Diagnosis Date   • Depression         Past Surgical History:   Procedure Laterality Date   • CARPAL TUNNEL RELEASE     •  SECTION     • FOOT SURGERY     • HYSTERECTOMY     • SHOULDER SURGERY     • TRIGGER FINGER RELEASE                               PT Assessment/Plan       17 0700       PT Assessment    Assessment Comments Performed prone lumber mechanical traction today and patient reported decreases in her radicular symptoms.   -AS     PT Plan    PT Plan Comments Continue with current treatment plan as tolerated by the patient.  -AS       User Key  (r) = Recorded By, (t) = Taken By, (c) = Cosigned By    Initials Name Provider Type    AS Robinson Mcgovern, PT Physical Therapist                Modalities       17 0700          Moist Heat    MH Applied Yes  -AS      Location Lumbar    patient seated  -AS      Rx Minutes 15 mins  -AS      ELECTRICAL STIMULATION    Attended/Unattended Unattended  -AS      Stimulation Type IFC  -AS      Location/Electrode Placement/Other Lumbar  -AS      Rx Minutes 15 mins  -AS      Traction 87981    Traction Type Lumbar  -AS      Rx Minutes 15  -AS       Duration Intermittent  -AS      Position Prone  -AS      Weight 50  -AS      Hold 60  -AS      Relax 10  -AS        User Key  (r) = Recorded By, (t) = Taken By, (c) = Cosigned By    Initials Name Provider Type    AS Robinson Mcgovern, PT Physical Therapist                Exercises       09/11/17 0700          Subjective Comments    Subjective Comments Patient states that she feels her back is feeling about the same this morning.   -AS      Exercise 1    Exercise Name 1 Seated HS Stretch  -AS      Reps 1 10  -AS      Time (Seconds) 1 10 sec hold  -AS      Exercise 2    Exercise Name 2 Seated Piriformis Stretch  -AS      Reps 2 10  -AS      Time (Seconds) 2 10 sec hold  -AS      Exercise 3    Exercise Name 3 --  -AS      Time (Minutes) 3 --  -AS      Exercise 4    Exercise Name 4 --  -AS      Reps 4 --  -AS      Time (Seconds) 4 --  -AS      Exercise 5    Exercise Name 5 --  -AS      Reps 5 --  -AS      Time (Seconds) 5 --  -AS      Exercise 6    Exercise Name 6 --  -AS      Reps 6 --  -AS      Time (Seconds) 6 --  -AS        User Key  (r) = Recorded By, (t) = Taken By, (c) = Cosigned By    Initials Name Provider Type    AS Robinson Mcgovern, PT Physical Therapist                                       Time Calculation:   Start Time: 0658  Stop Time: 0752  Time Calculation (min): 54 min    Therapy Charges for Today     Code Description Service Date Service Provider Modifiers Qty    22937368348  PT ELECTRICAL STIM UNATTENDED 9/11/2017 Robinson Mcgovern, PT  1    [id number]  PT TRACTION LUMBAR 9/11/2017 Robinson Mcgovern, PT GP 1                    Robinson Mcgovern, PT  9/11/2017

## 2017-09-13 ENCOUNTER — HOSPITAL ENCOUNTER (OUTPATIENT)
Dept: PHYSICAL THERAPY | Facility: HOSPITAL | Age: 57
Setting detail: THERAPIES SERIES
Discharge: HOME | End: 2017-09-13

## 2017-09-13 ENCOUNTER — OFFICE VISIT (OUTPATIENT)
Dept: INTERNAL MEDICINE | Facility: CLINIC | Age: 57
End: 2017-09-13

## 2017-09-13 VITALS
TEMPERATURE: 98.7 F | WEIGHT: 173.2 LBS | BODY MASS INDEX: 31.87 KG/M2 | SYSTOLIC BLOOD PRESSURE: 120 MMHG | DIASTOLIC BLOOD PRESSURE: 66 MMHG | OXYGEN SATURATION: 98 % | HEART RATE: 79 BPM | HEIGHT: 62 IN

## 2017-09-13 DIAGNOSIS — M54.16 LUMBAR RADICULOPATHY, ACUTE: Primary | ICD-10-CM

## 2017-09-13 DIAGNOSIS — M54.16 LUMBAR RADICULOPATHY: Primary | ICD-10-CM

## 2017-09-13 PROCEDURE — 97012 MECHANICAL TRACTION THERAPY: CPT

## 2017-09-13 PROCEDURE — G0283 ELEC STIM OTHER THAN WOUND: HCPCS

## 2017-09-13 PROCEDURE — 99213 OFFICE O/P EST LOW 20 MIN: CPT | Performed by: FAMILY MEDICINE

## 2017-09-13 RX ORDER — ESTERIFIED ESTROGEN AND METHYLTESTOSTERONE .625; 1.25 MG/1; MG/1
1 TABLET ORAL DAILY
COMMUNITY
End: 2018-06-14

## 2017-09-13 NOTE — THERAPY TREATMENT NOTE
Outpatient Physical Therapy Ortho Treatment Note  ERIN Orozco     Patient Name: Lilliam Portillo  : 1960  MRN: [id number]  Today's Date: 2017      Visit Date: 2017    Visit Dx:    ICD-10-CM ICD-9-CM   1. Lumbar radiculopathy M54.16 724.4       Patient Active Problem List   Diagnosis   • Atopic rhinitis   • Bronchitis   • Degeneration of intervertebral disc of cervical region   • Degeneration of intervertebral disc of lumbar region   • Depression   • Insomnia   • Atrophic vaginitis   • Prediabetes   • Diverticulitis of colon   • Pulmonary nodules   • History of diabetes mellitus resolved following bariatric surgery   • History of bariatric surgery   • Diverticulosis   • Acute cystitis   • Acute maxillary sinusitis   • Skull defect   • Lumbar radiculopathy, acute        Past Medical History:   Diagnosis Date   • Depression         Past Surgical History:   Procedure Laterality Date   • CARPAL TUNNEL RELEASE     •  SECTION     • FOOT SURGERY     • HYSTERECTOMY     • SHOULDER SURGERY     • TRIGGER FINGER RELEASE                               PT Assessment/Plan       17 0600       PT Assessment    Assessment Comments Progressed patient with lumbar mechanical traction this morning without complaints of increased pain or discomfort. Patient continues to have increased tolerance to exercises.  -AS     PT Plan    PT Plan Comments Continue with current treatment plan as tolerated by the patient.  -AS       User Key  (r) = Recorded By, (t) = Taken By, (c) = Cosigned By    Initials Name Provider Type    AS Robinson Mcgovern, PT Physical Therapist                Modalities       17 0600          Moist Heat    MH Applied Yes  -AS      Location Lumbar    patient seated  -AS      Rx Minutes 15 mins  -AS      ELECTRICAL STIMULATION    Attended/Unattended Unattended  -AS      Stimulation Type IFC  -AS      Location/Electrode Placement/Other Lumbar  -AS      Rx Minutes 15 mins  -AS       Traction 49181    Traction Type Lumbar  -AS      Rx Minutes 15  -AS      Duration Intermittent  -AS      Position Prone  -AS      Weight 75  -AS      Hold 60  -AS      Relax 10  -AS        User Key  (r) = Recorded By, (t) = Taken By, (c) = Cosigned By    Initials Name Provider Type    AS Robinson Mcgovern, PT Physical Therapist                Exercises       09/13/17 0600          Subjective Comments    Subjective Comments Patient states she did not notice a difference in her symptoms following the traction at her last treatment session.  -AS      Exercise 1    Exercise Name 1 Seated HS Stretch  -AS      Reps 1 10  -AS      Time (Seconds) 1 10 sec hold  -AS      Exercise 2    Exercise Name 2 Seated Piriformis Stretch  -AS      Reps 2 10  -AS      Time (Seconds) 2 10 sec hold  -AS        User Key  (r) = Recorded By, (t) = Taken By, (c) = Cosigned By    Initials Name Provider Type    AS Robinson Mcgovern, PT Physical Therapist                                       Time Calculation:   Start Time: 0640  Stop Time: 0734  Time Calculation (min): 54 min    Therapy Charges for Today     Code Description Service Date Service Provider Modifiers Qty    34225851456  PT TRACTION LUMBAR 9/13/2017 Robinson Mcgovern, PT GP 1    [id number]  PT ELECTRICAL STIM UNATTENDED 9/13/2017 Robinson Mcgovern, PT  1                    Robinson Mcgovern, PT  9/13/2017

## 2017-09-13 NOTE — PROGRESS NOTES
Subjective     Lilliam Portillo is a 57 y.o. female, who presents with a chief complaint of   Chief Complaint   Patient presents with   • Hip Pain     Right  and lower leg       HPI     Pt here to f/u on acute lumbar radiculopathy.  She started physical therapy, and took a course of prednisone, along with muscle relaxant and NSAIDs.  She is feeling much better.  Still with intermittent mild tingling in the right foot.    The following portions of the patient's history were reviewed and updated as appropriate: allergies, current medications, past family history, past medical history, past social history, past surgical history and problem list.    Allergies: Review of patient's allergies indicates no known allergies.    Review of Systems   Constitutional: Negative.    HENT: Negative.    Eyes: Negative.    Respiratory: Negative.    Cardiovascular: Negative.    Gastrointestinal: Negative.    Endocrine: Negative.    Genitourinary: Negative.    Musculoskeletal: Positive for back pain and myalgias. Negative for joint swelling.   Neurological: Negative for weakness.        Occasional tingling in right foot.   Hematological: Negative.        Objective     Wt Readings from Last 3 Encounters:   09/13/17 173 lb 3.2 oz (78.6 kg)   08/30/17 175 lb (79.4 kg)   06/08/17 169 lb (76.7 kg)     Temp Readings from Last 3 Encounters:   09/13/17 98.7 °F (37.1 °C)   08/30/17 98.4 °F (36.9 °C)   06/08/17 97.9 °F (36.6 °C)     BP Readings from Last 3 Encounters:   09/13/17 120/66   08/30/17 120/70   06/08/17 140/80     Pulse Readings from Last 3 Encounters:   09/13/17 79   08/30/17 76   06/08/17 82     Body mass index is 31.68 kg/(m^2).  SpO2 Readings from Last 3 Encounters:   09/13/17 98%   08/30/17 99%   05/08/17 97%       Physical Exam   Constitutional: She appears well-developed and well-nourished. No distress.   HENT:   Head: Normocephalic.   Eyes: Conjunctivae are normal.   Neck: Neck supple.   Pulmonary/Chest: Effort normal.    Musculoskeletal:        Lumbar back: She exhibits decreased range of motion (decreased extension) and pain (much improved ). She exhibits no tenderness, no bony tenderness, no swelling, no edema and no deformity.   Neurological: She is alert. She has normal strength and normal reflexes. She displays normal reflexes. She exhibits normal muscle tone. Coordination and gait normal.   Skin: Skin is warm and dry. No rash noted. She is not diaphoretic.   Psychiatric: She has a normal mood and affect. Her behavior is normal.   Nursing note and vitals reviewed.      Assessment/Plan   Lilliam was seen today for hip pain.    Diagnoses and all orders for this visit:    Lumbar radiculopathy, acute    Much improved.  Continue naproxen and baclofen as needed.  Complete course of physical therapy.    Outpatient Medications Prior to Visit   Medication Sig Dispense Refill   • albuterol (PROVENTIL) (2.5 MG/3ML) 0.083% nebulizer solution INHALE CONTENTS OF 1 VIAL BY MOUTH VIA NEBULIZER EVERY 4 HOURS AS NEEDED FOR WHEEZING 75 mL 6   • baclofen (LIORESAL) 10 MG tablet TAKE 1 TABLET BY MOUTH THREE TIMES DAILY AS NEEDED FOR MSP 30 tablet 0   • buPROPion XL (WELLBUTRIN XL) 300 MG 24 hr tablet TAKE 1 TABLET BY MOUTH EVERY DAY 30 tablet 6   • Cholecalciferol (VITAMIN D3) 2000 UNITS tablet Take 1 tablet by mouth Daily.     • ciclesonide (OMNARIS) 50 MCG/ACT nasal spray 2 sprays by Each Nare route Daily. 1 each 11   • HYDROcodone-acetaminophen (NORCO) 5-325 MG per tablet Take 1-2 tablets by mouth At Night As Needed for Moderate Pain  or Severe Pain . 20 tablet 0   • Multiple Vitamin (MULTI VITAMIN) tablet Take  by mouth Daily.     • naproxen (NAPROSYN) 500 MG tablet Take 1 tablet by mouth 2 (Two) Times a Day With Meals. 60 tablet 0   • Nutritional Supplements (DHEA PO) Take 1 tablet by mouth Daily.     • traZODone (DESYREL) 50 MG tablet TAKE 1 TABLET BY MOUTH EVERY NIGHT AT BEDTIME AS NEEDED 30 tablet 6   • conjugated estrogens (PREMARIN) 0.625  MG/GM vaginal cream 0.5 grams PV twice weekly. 30 g 3     No facility-administered medications prior to visit.      No orders of the defined types were placed in this encounter.    [unfilled]  Medications Discontinued During This Encounter   Medication Reason   • conjugated estrogens (PREMARIN) 0.625 MG/GM vaginal cream Therapy completed         Return if symptoms worsen or fail to improve, for Next scheduled follow up.

## 2017-09-15 ENCOUNTER — HOSPITAL ENCOUNTER (OUTPATIENT)
Dept: PHYSICAL THERAPY | Facility: HOSPITAL | Age: 57
Setting detail: THERAPIES SERIES
Discharge: HOME | End: 2017-09-15

## 2017-09-15 DIAGNOSIS — M54.16 LUMBAR RADICULOPATHY: Primary | ICD-10-CM

## 2017-09-15 PROCEDURE — 97012 MECHANICAL TRACTION THERAPY: CPT

## 2017-09-15 PROCEDURE — G0283 ELEC STIM OTHER THAN WOUND: HCPCS

## 2017-09-15 NOTE — THERAPY TREATMENT NOTE
Outpatient Physical Therapy Ortho Treatment Note  ERIN Orozco     Patient Name: Lilliam Portillo  : 1960  MRN: [id number]  Today's Date: 9/15/2017      Visit Date: 09/15/2017    Visit Dx:    ICD-10-CM ICD-9-CM   1. Lumbar radiculopathy M54.16 724.4       Patient Active Problem List   Diagnosis   • Atopic rhinitis   • Bronchitis   • Degeneration of intervertebral disc of cervical region   • Degeneration of intervertebral disc of lumbar region   • Depression   • Insomnia   • Atrophic vaginitis   • Prediabetes   • Diverticulitis of colon   • Pulmonary nodules   • History of diabetes mellitus resolved following bariatric surgery   • History of bariatric surgery   • Diverticulosis   • Acute cystitis   • Acute maxillary sinusitis   • Skull defect   • Lumbar radiculopathy, acute        Past Medical History:   Diagnosis Date   • Depression         Past Surgical History:   Procedure Laterality Date   • CARPAL TUNNEL RELEASE     •  SECTION     • FOOT SURGERY     • HYSTERECTOMY     • SHOULDER SURGERY     • TRIGGER FINGER RELEASE                               PT Assessment/Plan       09/15/17 0800       PT Assessment    Assessment Comments Continued to progress patient with lumbar traction this morning without complaints.   -AS     PT Plan    PT Plan Comments Continue with current treatment plan as tolerated by the patient.  -AS       User Key  (r) = Recorded By, (t) = Taken By, (c) = Cosigned By    Initials Name Provider Type    AS Robinson Mcgovern, PT Physical Therapist                Modalities       09/15/17 0800          Moist Heat    MH Applied Yes  -AS      Location Lumbar    patient seated  -AS      Rx Minutes 15 mins  -AS      ELECTRICAL STIMULATION    Attended/Unattended Unattended  -AS      Stimulation Type IFC  -AS      Location/Electrode Placement/Other Lumbar  -AS      Rx Minutes 15 mins  -AS      Traction 40813    Traction Type Lumbar  -AS      Rx Minutes 15  -AS      Position Prone  -AS       Weight 75  -AS      Hold 60  -AS      Relax 10  -AS        User Key  (r) = Recorded By, (t) = Taken By, (c) = Cosigned By    Initials Name Provider Type    AS Robinson Mcgovern, PT Physical Therapist                Exercises       09/15/17 0800          Subjective Comments    Subjective Comments Patient reports she is feeling a little better this morning but she continues to be unable to lay on her back due to pain.  -AS      Exercise 1    Exercise Name 1 Seated HS Stretch  -AS      Reps 1 10  -AS      Time (Seconds) 1 10 sec hold  -AS      Exercise 2    Exercise Name 2 Seated Piriformis Stretch  -AS      Reps 2 10  -AS      Time (Seconds) 2 10 sec hold  -AS        User Key  (r) = Recorded By, (t) = Taken By, (c) = Cosigned By    Initials Name Provider Type    AS Robinson Mcgovern, TONY Physical Therapist                                       Time Calculation:   Start Time: 0700  Stop Time: 0750  Time Calculation (min): 50 min    Therapy Charges for Today     Code Description Service Date Service Provider Modifiers Qty    46788790871 HC PT TRACTION CERVICAL 9/15/2017 Robinson Mcgovern, PT GP 1    [id number] HC PT ELECTRICAL STIM UNATTENDED 9/15/2017 Robinson Mcgovern, PT  1                    Robinson Mcgovern, PT  9/15/2017

## 2017-09-18 ENCOUNTER — HOSPITAL ENCOUNTER (OUTPATIENT)
Dept: PHYSICAL THERAPY | Facility: HOSPITAL | Age: 57
Setting detail: THERAPIES SERIES
Discharge: HOME | End: 2017-09-18

## 2017-09-18 DIAGNOSIS — M54.16 LUMBAR RADICULOPATHY: Primary | ICD-10-CM

## 2017-09-18 DIAGNOSIS — M54.16 LUMBAR RADICULOPATHY, ACUTE: ICD-10-CM

## 2017-09-18 PROCEDURE — 97012 MECHANICAL TRACTION THERAPY: CPT

## 2017-09-18 PROCEDURE — G0283 ELEC STIM OTHER THAN WOUND: HCPCS

## 2017-09-18 PROCEDURE — 97140 MANUAL THERAPY 1/> REGIONS: CPT

## 2017-09-18 RX ORDER — BACLOFEN 10 MG/1
TABLET ORAL
Qty: 30 TABLET | Refills: 6 | Status: SHIPPED | OUTPATIENT
Start: 2017-09-18 | End: 2017-11-26 | Stop reason: SDUPTHER

## 2017-09-18 NOTE — THERAPY TREATMENT NOTE
Outpatient Physical Therapy Ortho Treatment Note  ERIN Orozco     Patient Name: Lilliam Portillo  : 1960  MRN: [id number]  Today's Date: 2017      Visit Date: 2017    Visit Dx:    ICD-10-CM ICD-9-CM   1. Lumbar radiculopathy M54.16 724.4       Patient Active Problem List   Diagnosis   • Atopic rhinitis   • Bronchitis   • Degeneration of intervertebral disc of cervical region   • Degeneration of intervertebral disc of lumbar region   • Depression   • Insomnia   • Atrophic vaginitis   • Prediabetes   • Diverticulitis of colon   • Pulmonary nodules   • History of diabetes mellitus resolved following bariatric surgery   • History of bariatric surgery   • Diverticulosis   • Acute cystitis   • Acute maxillary sinusitis   • Skull defect   • Lumbar radiculopathy, acute        Past Medical History:   Diagnosis Date   • Depression         Past Surgical History:   Procedure Laterality Date   • CARPAL TUNNEL RELEASE     •  SECTION     • FOOT SURGERY     • HYSTERECTOMY     • SHOULDER SURGERY     • TRIGGER FINGER RELEASE                               PT Assessment/Plan       17 0600       PT Assessment    Assessment Comments Progressed the duration of mechanical lumbar traction as well as performed manual AP's of lumbar spine and LE distraction this morning.  -AS     PT Plan    PT Plan Comments Continue with current treatment plan as tolerated by the patient.  -AS       User Key  (r) = Recorded By, (t) = Taken By, (c) = Cosigned By    Initials Name Provider Type    AS Robinson Mcgovern, PT Physical Therapist                Modalities       17 0600          Moist Heat    MH Applied Yes  -AS      Location Lumbar    patient seated  -AS      Rx Minutes 15 mins  -AS      ELECTRICAL STIMULATION    Attended/Unattended Unattended  -AS      Stimulation Type IFC  -AS      Location/Electrode Placement/Other Lumbar  -AS      Rx Minutes 15 mins  -AS      Traction 66913    Traction Type Lumbar  -AS       Rx Minutes 20  -AS      Position Prone  -AS      Weight 75  -AS      Hold 60  -AS      Relax 10  -AS        User Key  (r) = Recorded By, (t) = Taken By, (c) = Cosigned By    Initials Name Provider Type    AS Robinson Mcgovern, PT Physical Therapist                Exercises       09/18/17 0600          Subjective Comments    Subjective Comments Patient states that her leg pain has greatly improved but states her low back continues to be painful.  -AS      Exercise 1    Exercise Name 1 Seated HS Stretch  -AS      Reps 1 10  -AS      Time (Seconds) 1 10 sec hold  -AS      Exercise 2    Exercise Name 2 Seated Piriformis Stretch  -AS      Reps 2 10  -AS      Time (Seconds) 2 10 sec hold  -AS      Exercise 3    Exercise Name 3 Manual AP's and LE distraction  -AS      Time (Minutes) 3 10 min  -AS        User Key  (r) = Recorded By, (t) = Taken By, (c) = Cosigned By    Initials Name Provider Type    AS Robinson Mcgovern, PT Physical Therapist                                       Time Calculation:   Start Time: 0627  Stop Time: 0731  Time Calculation (min): 64 min    Therapy Charges for Today     Code Description Service Date Service Provider Modifiers Qty    63324809890  PT TRACTION LUMBAR 9/18/2017 Robinson Mcgovern, PT GP 1    15566083795 HC PT MANUAL THERAPY EA 15 MIN 9/18/2017 Robinson Mcgovern, PT GP 1    [id number]  PT ELECTRICAL STIM UNATTENDED 9/18/2017 Robinson Mcgovern, PT  1                    Robinson Mcgovern, PT  9/18/2017

## 2017-09-20 ENCOUNTER — HOSPITAL ENCOUNTER (OUTPATIENT)
Dept: PHYSICAL THERAPY | Facility: HOSPITAL | Age: 57
Setting detail: THERAPIES SERIES
Discharge: HOME | End: 2017-09-20

## 2017-09-20 DIAGNOSIS — M54.16 LUMBAR RADICULOPATHY: Primary | ICD-10-CM

## 2017-09-20 PROCEDURE — 97140 MANUAL THERAPY 1/> REGIONS: CPT

## 2017-09-20 PROCEDURE — G0283 ELEC STIM OTHER THAN WOUND: HCPCS

## 2017-09-20 PROCEDURE — 97012 MECHANICAL TRACTION THERAPY: CPT

## 2017-09-20 NOTE — THERAPY TREATMENT NOTE
Outpatient Physical Therapy Ortho Treatment Note  ERIN Orozco     Patient Name: Lilliam Portillo  : 1960  MRN: [id number]  Today's Date: 2017      Visit Date: 2017    Visit Dx:    ICD-10-CM ICD-9-CM   1. Lumbar radiculopathy M54.16 724.4       Patient Active Problem List   Diagnosis   • Atopic rhinitis   • Bronchitis   • Degeneration of intervertebral disc of cervical region   • Degeneration of intervertebral disc of lumbar region   • Depression   • Insomnia   • Atrophic vaginitis   • Prediabetes   • Diverticulitis of colon   • Pulmonary nodules   • History of diabetes mellitus resolved following bariatric surgery   • History of bariatric surgery   • Diverticulosis   • Acute cystitis   • Acute maxillary sinusitis   • Skull defect   • Lumbar radiculopathy, acute        Past Medical History:   Diagnosis Date   • Depression         Past Surgical History:   Procedure Laterality Date   • CARPAL TUNNEL RELEASE     •  SECTION     • FOOT SURGERY     • HYSTERECTOMY     • SHOULDER SURGERY     • TRIGGER FINGER RELEASE                               PT Assessment/Plan       17 0600       PT Assessment    Assessment Comments Continued to progress patient with extension based exercises with the addition of prone press ups with overpressure and prone hip extension. Progressed patient with duration of lumbar mechanical traction.  -AS     PT Plan    PT Plan Comments Continue with current treatment plan as tolerated by the patient.  -AS       User Key  (r) = Recorded By, (t) = Taken By, (c) = Cosigned By    Initials Name Provider Type    AS Robinson Mcgovern, PT Physical Therapist                Modalities       17 0600          Moist Heat    MH Applied Yes  -AS      Location Lumbar    patient seated  -AS      Rx Minutes 15 mins  -AS      ELECTRICAL STIMULATION    Attended/Unattended Unattended  -AS      Stimulation Type IFC  -AS      Location/Electrode Placement/Other Lumbar  -AS      Rx  Minutes 15 mins  -AS      Traction 01093    Traction Type Lumbar  -AS      Rx Minutes 20  -AS      Position Prone  -AS      Weight 75  -AS      Hold 60  -AS      Relax 10  -AS        User Key  (r) = Recorded By, (t) = Taken By, (c) = Cosigned By    Initials Name Provider Type    AS Robinson Mcgovern, PT Physical Therapist                Exercises       09/20/17 0600          Subjective Comments    Subjective Comments Patient reports that she is feeling better at this time.  -AS      Exercise 1    Exercise Name 1 Seated HS Stretch  -AS      Reps 1 10  -AS      Time (Seconds) 1 10 sec hold  -AS      Exercise 2    Exercise Name 2 Seated Piriformis Stretch  -AS      Reps 2 10  -AS      Time (Seconds) 2 10 sec hold  -AS      Exercise 3    Exercise Name 3 Manual AP's and LE distraction  -AS      Time (Minutes) 3 10 min  -AS      Exercise 4    Exercise Name 4 Prone Press Ups with Over pressure  -AS      Reps 4 10  -AS      Time (Seconds) 4 5 sec hold  -AS      Exercise 5    Exercise Name 5 Prone Hip Extension  -AS      Reps 5 25  -AS        User Key  (r) = Recorded By, (t) = Taken By, (c) = Cosigned By    Initials Name Provider Type    AS Robinson Mcgovern, PT Physical Therapist                                       Time Calculation:   Start Time: 0647  Stop Time: 0745  Time Calculation (min): 58 min    Therapy Charges for Today     Code Description Service Date Service Provider Modifiers Qty    08456377829 HC PT TRACTION LUMBAR 9/20/2017 Robinson Mcgovern, PT GP 1    92580136744 HC PT MANUAL THERAPY EA 15 MIN 9/20/2017 Robinson Mcgovern, PT GP 1    64927851278 HC PT ELECTRICAL STIM UNATTENDED 9/20/2017 Robinson Mcgovern, PT  1                    Robinson Mcgovern, PT  9/20/2017

## 2017-09-25 ENCOUNTER — TELEPHONE (OUTPATIENT)
Dept: INTERNAL MEDICINE | Facility: CLINIC | Age: 57
End: 2017-09-25

## 2017-09-25 ENCOUNTER — APPOINTMENT (OUTPATIENT)
Dept: PHYSICAL THERAPY | Facility: HOSPITAL | Age: 57
End: 2017-09-25

## 2017-09-25 RX ORDER — AMOXICILLIN 875 MG/1
875 TABLET, COATED ORAL 2 TIMES DAILY
Qty: 20 TABLET | Refills: 0 | Status: SHIPPED | OUTPATIENT
Start: 2017-09-25 | End: 2017-10-05

## 2017-09-25 NOTE — TELEPHONE ENCOUNTER
Lilliam Portillo was wondering if  You could call in something for what she thinks is a sinus infection.  [facility] in [place]. maggi

## 2017-09-26 ENCOUNTER — TELEPHONE (OUTPATIENT)
Dept: INTERNAL MEDICINE | Facility: CLINIC | Age: 57
End: 2017-09-26

## 2017-09-26 NOTE — TELEPHONE ENCOUNTER
----- Message from Terrieluther Whitehead sent at 9/25/2017  8:12 AM EDT -----  COULD DR RAMSEY ORDER SOMETHING FOR A SINUS INFECTION FROM [facility] IN [place]?  PLEASE GIVE PATIENT A CALL BACK.  PATIENT WAS JUST IN ON 9/13    [phone]    Meds were sent to her pharmacy  By Dr. BRAULIO tay

## 2017-09-27 ENCOUNTER — HOSPITAL ENCOUNTER (OUTPATIENT)
Dept: PHYSICAL THERAPY | Facility: HOSPITAL | Age: 57
Setting detail: THERAPIES SERIES
Discharge: HOME | End: 2017-09-27

## 2017-09-27 DIAGNOSIS — M54.16 LUMBAR RADICULOPATHY: Primary | ICD-10-CM

## 2017-09-27 PROCEDURE — G0283 ELEC STIM OTHER THAN WOUND: HCPCS

## 2017-09-27 PROCEDURE — 97140 MANUAL THERAPY 1/> REGIONS: CPT

## 2017-09-27 PROCEDURE — 97012 MECHANICAL TRACTION THERAPY: CPT

## 2017-09-27 NOTE — THERAPY TREATMENT NOTE
Outpatient Physical Therapy Ortho Treatment Note  ERIN Orozco     Patient Name: Lilliam Portillo  : 1960  MRN: [id number]  Today's Date: 2017      Visit Date: 2017    Visit Dx:    ICD-10-CM ICD-9-CM   1. Lumbar radiculopathy M54.16 724.4       Patient Active Problem List   Diagnosis   • Atopic rhinitis   • Bronchitis   • Degeneration of intervertebral disc of cervical region   • Degeneration of intervertebral disc of lumbar region   • Depression   • Insomnia   • Atrophic vaginitis   • Prediabetes   • Diverticulitis of colon   • Pulmonary nodules   • History of diabetes mellitus resolved following bariatric surgery   • History of bariatric surgery   • Diverticulosis   • Acute cystitis   • Acute maxillary sinusitis   • Skull defect   • Lumbar radiculopathy, acute        Past Medical History:   Diagnosis Date   • Depression         Past Surgical History:   Procedure Laterality Date   • CARPAL TUNNEL RELEASE     •  SECTION     • FOOT SURGERY     • HYSTERECTOMY     • SHOULDER SURGERY     • TRIGGER FINGER RELEASE                               PT Assessment/Plan       17 0700       PT Assessment    Assessment Comments Introduced manual extension based MET for lumbar spine and SI joint on Right. Also, progressed patient with strengthening exercises and her HEP this morning.  -AS     PT Plan    PT Plan Comments Continue with current treatment plan as tolerated by the patient.  -AS       User Key  (r) = Recorded By, (t) = Taken By, (c) = Cosigned By    Initials Name Provider Type    AS Robinson Mcgovern, PT Physical Therapist                Modalities       17 0700          Moist Heat    MH Applied Yes  -AS      Location Lumbar    patient seated  -AS      Rx Minutes 15 mins  -AS      ELECTRICAL STIMULATION    Attended/Unattended Unattended  -AS      Stimulation Type IFC  -AS      Location/Electrode Placement/Other Lumbar  -AS      Rx Minutes 15 mins  -AS      Traction 83860     Traction Type Lumbar  -AS      Rx Minutes 20  -AS      Position Prone  -AS      Weight 75  -AS      Hold 60  -AS      Relax 10  -AS        User Key  (r) = Recorded By, (t) = Taken By, (c) = Cosigned By    Initials Name Provider Type    AS Robinson Mcgovern, PT Physical Therapist                Exercises       09/27/17 0700          Subjective Comments    Subjective Comments Patient states that she is feeling better but states seh has the nerve pain down her leg when she carries something heavy. She states most of her pain is located around her right SI joint.  -AS      Exercise 1    Exercise Name 1 Seated HS Stretch  -AS      Reps 1 10  -AS      Time (Seconds) 1 10 sec hold  -AS      Exercise 2    Exercise Name 2 Seated Piriformis Stretch  -AS      Reps 2 10  -AS      Time (Seconds) 2 10 sec hold  -AS      Exercise 3    Exercise Name 3 Manual AP's and LE distraction  -AS      Time (Minutes) 3 10 min  -AS      Exercise 4    Exercise Name 4 Prone Press Ups with Over pressure  -AS      Reps 4 10  -AS      Time (Seconds) 4 5 sec hold  -AS      Exercise 5    Exercise Name 5 Prone Hip Extension  -AS      Reps 5 30  -AS      Exercise 6    Exercise Name 6 Manual Ext MET  -AS      Time (Minutes) 6 5 min  -AS      Exercise 7    Exercise Name 7 Unilateral press up on Right  -AS      Reps 7 10  -AS      Time (Seconds) 7 5 sec hold  -AS        User Key  (r) = Recorded By, (t) = Taken By, (c) = Cosigned By    Initials Name Provider Type    AS Robinson Mcgovern, PT Physical Therapist                                       Time Calculation:   Start Time: 0643  Stop Time: 0751  Time Calculation (min): 68 min    Therapy Charges for Today     Code Description Service Date Service Provider Modifiers Qty    36883412193 HC PT TRACTION LUMBAR 9/27/2017 Robinson Mcgovern, PT GP 1    04156043974 HC PT MANUAL THERAPY EA 15 MIN 9/27/2017 Robinson Mcgovern, PT GP 1    87836870780 HC PT ELECTRICAL STIM UNATTENDED 9/27/2017 Robinson  Segundo Mcgovern, PT  1                    Robinson Mcgovern, PT  9/27/2017

## 2017-10-02 ENCOUNTER — APPOINTMENT (OUTPATIENT)
Dept: PHYSICAL THERAPY | Facility: HOSPITAL | Age: 57
End: 2017-10-02

## 2017-10-04 ENCOUNTER — HOSPITAL ENCOUNTER (OUTPATIENT)
Dept: PHYSICAL THERAPY | Facility: HOSPITAL | Age: 57
Setting detail: THERAPIES SERIES
Discharge: HOME | End: 2017-10-04

## 2017-10-04 DIAGNOSIS — M54.16 LUMBAR RADICULOPATHY: Primary | ICD-10-CM

## 2017-10-04 PROCEDURE — 97012 MECHANICAL TRACTION THERAPY: CPT

## 2017-10-04 PROCEDURE — 97140 MANUAL THERAPY 1/> REGIONS: CPT

## 2017-10-04 NOTE — THERAPY TREATMENT NOTE
Outpatient Physical Therapy Ortho Treatment Note  ERIN Orozco     Patient Name: Lilliam Portillo  : 1960  MRN: [id number]  Today's Date: 10/4/2017      Visit Date: 10/04/2017    Visit Dx:    ICD-10-CM ICD-9-CM   1. Lumbar radiculopathy M54.16 724.4       Patient Active Problem List   Diagnosis   • Atopic rhinitis   • Bronchitis   • Degeneration of intervertebral disc of cervical region   • Degeneration of intervertebral disc of lumbar region   • Depression   • Insomnia   • Atrophic vaginitis   • Prediabetes   • Diverticulitis of colon   • Pulmonary nodules   • History of diabetes mellitus resolved following bariatric surgery   • History of bariatric surgery   • Diverticulosis   • Acute cystitis   • Acute maxillary sinusitis   • Skull defect   • Lumbar radiculopathy, acute        Past Medical History:   Diagnosis Date   • Depression         Past Surgical History:   Procedure Laterality Date   • CARPAL TUNNEL RELEASE     •  SECTION     • FOOT SURGERY     • HYSTERECTOMY     • SHOULDER SURGERY     • TRIGGER FINGER RELEASE                               PT Assessment/Plan       10/04/17 06       PT Assessment    Assessment Comments Continued to progress patient with extension based exercises. Progressed patient with duration of lumbar mechanical traction.  -AS     PT Plan    PT Plan Comments Continue with current treatment plan as tolerated by the patient.  -AS       User Key  (r) = Recorded By, (t) = Taken By, (c) = Cosigned By    Initials Name Provider Type    AS Robinson Mcgovern, PT Physical Therapist                Modalities       10/04/17 06          Subjective Comments    Subjective Comments Patient states she rarely has the numbness and tingling down her right LE but states she does have a burning pain across her low back. She states she has improved as she is now able to lay on her back without increases in pain.  -AS      Moist Heat    MH Applied Yes  -AS      Location Lumbar    patient  seated  -AS      Rx Minutes 15 mins  -AS      ELECTRICAL STIMULATION    Attended/Unattended Unattended  -AS      Stimulation Type IFC  -AS      Location/Electrode Placement/Other Lumbar  -AS      Rx Minutes 15 mins  -AS      Traction 95658    Traction Type Lumbar  -AS      Rx Minutes 15  -AS      Duration Intermittent  -AS      Position Prone  -AS      Weight 80  -AS      Hold 60  -AS      Relax 10  -AS        User Key  (r) = Recorded By, (t) = Taken By, (c) = Cosigned By    Initials Name Provider Type    AS Robinson Mcgovern, PT Physical Therapist                Exercises       10/04/17 0600          Subjective Comments    Subjective Comments Patient states she rarely has the numbness and tingling down her right LE but states she does have a burning pain across her low back. She states she has improved as she is now able to lay on her back without increases in pain.  -AS      Exercise 1    Exercise Name 1 HS Stretch  -AS      Reps 1 10  -AS      Time (Seconds) 1 10 sec hold  -AS      Exercise 2    Exercise Name 2 Piriformis Stretch  -AS      Reps 2 10  -AS      Time (Seconds) 2 10 sec hold  -AS      Exercise 3    Exercise Name 3 Manual AP's and LE distraction  -AS      Time (Minutes) 3 10 min  -AS      Exercise 4    Exercise Name 4 Prone Press Ups with Over pressure  -AS      Reps 4 10  -AS      Time (Seconds) 4 5 sec hold  -AS      Exercise 5    Exercise Name 5 Prone Hip Extension  -AS      Reps 5 30  -AS      Exercise 6    Exercise Name 6 --  -AS      Time (Minutes) 6 --  -AS      Exercise 7    Exercise Name 7 Unilateral press up on Right  -AS      Reps 7 10  -AS      Time (Seconds) 7 5 sec hold  -AS        User Key  (r) = Recorded By, (t) = Taken By, (c) = Cosigned By    Initials Name Provider Type    AS Robinson Mcgovern, PT Physical Therapist                                       Time Calculation:   Start Time: 0635  Stop Time: 0718  Time Calculation (min): 43 min    Therapy Charges for Today     Code  Description Service Date Service Provider Modifiers Qty    36734756296 HC PT MANUAL THERAPY EA 15 MIN 10/4/2017 Robinson Mcgovern, PT GP 1    16906674289 HC PT TRACTION LUMBAR 10/4/2017 Robinson Mcgovern, PT GP 1                    Robinson Mcgovern, PT  10/4/2017

## 2017-10-08 DIAGNOSIS — M54.16 LUMBAR RADICULOPATHY, ACUTE: ICD-10-CM

## 2017-10-09 ENCOUNTER — HOSPITAL ENCOUNTER (OUTPATIENT)
Dept: PHYSICAL THERAPY | Facility: HOSPITAL | Age: 57
Setting detail: THERAPIES SERIES
Discharge: HOME | End: 2017-10-09

## 2017-10-09 DIAGNOSIS — M54.16 LUMBAR RADICULOPATHY: Primary | ICD-10-CM

## 2017-10-09 PROCEDURE — 97110 THERAPEUTIC EXERCISES: CPT

## 2017-10-09 PROCEDURE — 97012 MECHANICAL TRACTION THERAPY: CPT

## 2017-10-09 RX ORDER — NAPROXEN 500 MG/1
TABLET ORAL
Qty: 60 TABLET | Refills: 0 | Status: SHIPPED | OUTPATIENT
Start: 2017-10-09 | End: 2017-11-07 | Stop reason: SDUPTHER

## 2017-10-09 NOTE — THERAPY RE-EVALUATION
Outpatient Physical Therapy Ortho Re-Evaluation  ERIN Orozco     Patient Name: Lilliam Portillo  : 1960  MRN: [id number]  Today's Date: 10/9/2017      Visit Date: 10/09/2017    Patient Active Problem List   Diagnosis   • Atopic rhinitis   • Bronchitis   • Degeneration of intervertebral disc of cervical region   • Degeneration of intervertebral disc of lumbar region   • Depression   • Insomnia   • Atrophic vaginitis   • Prediabetes   • Diverticulitis of colon   • Pulmonary nodules   • History of diabetes mellitus resolved following bariatric surgery   • History of bariatric surgery   • Diverticulosis   • Acute cystitis   • Acute maxillary sinusitis   • Skull defect   • Lumbar radiculopathy, acute        Past Medical History:   Diagnosis Date   • Depression         Past Surgical History:   Procedure Laterality Date   • CARPAL TUNNEL RELEASE     •  SECTION     • FOOT SURGERY     • HYSTERECTOMY     • SHOULDER SURGERY     • TRIGGER FINGER RELEASE         Visit Dx:     ICD-10-CM ICD-9-CM   1. Lumbar radiculopathy M54.16 724.4                 PT Ortho       10/09/17 0700    Trunk    Flexion AROM WNL (0-80 degrees)  -AS    Extension AROM Deficit 50% limited  -AS    Lt Lat Flexion AROM Deficit 25% limited  -AS    Right Lateral Flexion AROM WNL (0-35 degrees)  -AS    Lt Rotation AROM Deficit 25% limited  -AS    Right Rotation AROM WNL (0-45 degrees)  -AS    Trunk    Trunk Flexion Gross Movement (4-/5) good minus  -AS    Trunk Extension Gross Movement (3+/5) fair plus  -AS    Left Hip    Hip Extension Gross Movement (4-/5) good minus  -AS    Hip ABduction Gross Movement (4-/5) good minus  -AS    Right Hip    Hip Extension Gross Movement (4-/5) good minus  -AS    Hip ABduction Gross Movement (4-/5) good minus  -AS      User Key  (r) = Recorded By, (t) = Taken By, (c) = Cosigned By    Initials Name Provider Type    AS Robinson Mcgovern, PT Physical Therapist                                PT OP Goals        10/09/17 0700       PT Short Term Goals    STG Date to Achieve 10/23/17  -AS     STG 1 Patient to be compliant with her initial HEP.  -AS     STG 1 Progress Met  -AS     STG 2 Patient to improve her bilateral hip extension and abduction strength to 4-/5.   -AS     STG 2 Progress Met  -AS     STG 3 Patient to improve her trunk flexion and extension strength to 4-/5.  -AS     STG 3 Progress Ongoing;Progressing  -AS     STG 4 Patient to report improved radicular symptomsin RLE by 25-50%.  -AS     STG 4 Progress Met  -AS     STG 5 Patient to report worst pain on VAS of 4-5/10 with lying supine, prone, or sitting.  -AS     STG 5 Progress Ongoing;Progressing  -AS     Long Term Goals    LTG Date to Achieve 11/06/17  -AS     LTG 1 Patient to be compliant with her advanced HEP.  -AS     LTG 1 Progress Met  -AS     LTG 2 Patient to improve her bilateral hip extensiona nd abduction strength to 4/5.   -AS     LTG 2 Progress Ongoing;Progressing  -AS     LTG 3 Patient to improve her trunk flexion and extension strength to 4/5.  -AS     LTG 3 Progress Ongoing;Progressing  -AS     LTG 4 Patient to report improved radicular symptomsin RLE by 50-75%.  -AS     LTG 4 Progress Met  -AS     LTG 5 Patient to report worst pain on VAS of 2-3/10 with lying supine, prone, or sitting.  -AS     LTG 5 Progress Ongoing;Progressing  -AS     LTG 6 Patient to report improved function and decreased pain on Back Index by >10-15 points.  -AS     LTG 6 Progress Ongoing;Progressing  -AS     Time Calculation    PT Goal Re-Cert Due Date 11/06/17  -AS       User Key  (r) = Recorded By, (t) = Taken By, (c) = Cosigned By    Initials Name Provider Type    AS Robinson Mcgovern, PT Physical Therapist                PT Assessment/Plan       10/09/17 0700       PT Assessment    Functional Limitations Limitation in home management;Limitations in community activities;Performance in leisure activities;Performance in self-care ADL;Performance in sport  activities;Performance in work activities  -AS     Impairments Muscle strength;Pain  -AS     Assessment Comments Patient continues to report improvements in her pain and discomfort, however, she reports these improvements are temporary. She states after a few days after her treatment with PT the pain down her leg comes back. Patient does report improved tolerance with laying supine and prone. Patient's lumbar ROM and strength have all improved with PT.  -AS     Please refer to paper survey for additional self-reported information Yes  -AS     Rehab Potential Good  -AS     Patient/caregiver participated in establishment of treatment plan and goals Yes  -AS     Patient would benefit from skilled therapy intervention Yes  -AS     PT Plan    PT Frequency 2x/week  -AS     Predicted Duration of Therapy Intervention (days/wks) 4-6 weeks  -AS     Planned CPT's? PT RE-EVAL: 87429;PT THER PROC EA 15 MIN: 58729;PT THER ACT EA 15 MIN: 32132;PT MANUAL THERAPY EA 15 MIN: 81259;PT ELECTRICAL STIM UNATTEND: ;PT ULTRASOUND EA 15 MIN: 13391;PT TRACTION LUMBAR: 88689;PT HOT OR COLD PACK TREAT MCARE  -AS     PT Plan Comments Continue with current treatment plan as tolerated by the patient.  -AS       User Key  (r) = Recorded By, (t) = Taken By, (c) = Cosigned By    Initials Name Provider Type    AS Robinson Mcgovern, PT Physical Therapist                Modalities       10/09/17 0700          Moist Heat    MH Applied Yes  -AS      Location Lumbar    patient seated  -AS      Rx Minutes 15 mins  -AS      ELECTRICAL STIMULATION    Attended/Unattended Unattended  -AS      Stimulation Type IFC  -AS      Location/Electrode Placement/Other Lumbar  -AS      Rx Minutes 15 mins  -AS      Traction 27981    Traction Type Lumbar  -AS      Rx Minutes 15  -AS      Duration Intermittent  -AS      Position Prone  -AS      Weight 80  -AS      Hold 60  -AS      Relax 10  -AS        User Key  (r) = Recorded By, (t) = Taken By, (c) = Cosigned By     Initials Name Provider Type    AS Robinson Mcgovern, PT Physical Therapist              Exercises       10/09/17 0700          Subjective Comments    Subjective Comments Patient reports she feels better but she states she continues to have pain.  -AS      Exercise 1    Exercise Name 1 HS Stretch  -AS      Reps 1 10  -AS      Time (Seconds) 1 10 sec hold  -AS      Exercise 2    Exercise Name 2 Piriformis Stretch  -AS      Reps 2 10  -AS      Time (Seconds) 2 10 sec hold  -AS      Exercise 3    Exercise Name 3 Manual AP's and LE distraction  -AS      Time (Minutes) 3 10 min  -AS      Exercise 4    Exercise Name 4 Prone Press Ups with Over pressure  -AS      Reps 4 10  -AS      Time (Seconds) 4 5 sec hold  -AS      Exercise 5    Exercise Name 5 Prone Hip Extension  -AS      Reps 5 30  -AS      Exercise 7    Exercise Name 7 Unilateral press up on Right  -AS      Reps 7 10  -AS      Time (Seconds) 7 5 sec hold  -AS        User Key  (r) = Recorded By, (t) = Taken By, (c) = Cosigned By    Initials Name Provider Type    AS Robinson Mcgovern, PT Physical Therapist                              Time Calculation:   Start Time: 0642  Stop Time: 0746  Time Calculation (min): 64 min     Therapy Charges for Today     Code Description Service Date Service Provider Modifiers Qty    89215132130  PT THER PROC EA 15 MIN 10/9/2017 Robinson Mcgovern, PT GP 1    88312267836  PT TRACTION LUMBAR 10/9/2017 Robinson Mcgovern, PT GP 1                    Robinson Mcgovern, PT  10/9/2017

## 2017-10-11 ENCOUNTER — HOSPITAL ENCOUNTER (OUTPATIENT)
Dept: PHYSICAL THERAPY | Facility: HOSPITAL | Age: 57
Setting detail: THERAPIES SERIES
Discharge: HOME | End: 2017-10-11

## 2017-10-11 DIAGNOSIS — M54.16 LUMBAR RADICULOPATHY: Primary | ICD-10-CM

## 2017-10-11 PROCEDURE — 97012 MECHANICAL TRACTION THERAPY: CPT

## 2017-10-11 PROCEDURE — 97140 MANUAL THERAPY 1/> REGIONS: CPT

## 2017-10-11 PROCEDURE — 97110 THERAPEUTIC EXERCISES: CPT

## 2017-10-11 NOTE — THERAPY TREATMENT NOTE
Outpatient Physical Therapy Ortho Treatment Note  ERIN [facility]rozco     Patient Name: Lilliam Portillo  : 1960  MRN: [id number]  Today's Date: 10/11/2017      Visit Date: 10/11/2017    Visit Dx:    ICD-10-CM ICD-9-CM   1. Lumbar radiculopathy M54.16 724.4       Patient Active Problem List   Diagnosis   • Atopic rhinitis   • Bronchitis   • Degeneration of intervertebral disc of cervical region   • Degeneration of intervertebral disc of lumbar region   • Depression   • Insomnia   • Atrophic vaginitis   • Prediabetes   • Diverticulitis of colon   • Pulmonary nodules   • History of diabetes mellitus resolved following bariatric surgery   • History of bariatric surgery   • Diverticulosis   • Acute cystitis   • Acute maxillary sinusitis   • Skull defect   • Lumbar radiculopathy, acute        Past Medical History:   Diagnosis Date   • Depression         Past Surgical History:   Procedure Laterality Date   • CARPAL TUNNEL RELEASE     •  SECTION     • FOOT SURGERY     • HYSTERECTOMY     • SHOULDER SURGERY     • TRIGGER FINGER RELEASE               PT Ortho       10/09/17 0700    Trunk    Flexion AROM WNL (0-80 degrees)  -AS    Extension AROM Deficit 50% limited  -AS    Lt Lat Flexion AROM Deficit 25% limited  -AS    Right Lateral Flexion AROM WNL (0-35 degrees)  -AS    Lt Rotation AROM Deficit 25% limited  -AS    Right Rotation AROM WNL (0-45 degrees)  -AS    Trunk    Trunk Flexion Gross Movement (4-/5) good minus  -AS    Trunk Extension Gross Movement (3+/5) fair plus  -AS    Left Hip    Hip Extension Gross Movement (4-/5) good minus  -AS    Hip ABduction Gross Movement (4-/5) good minus  -AS    Right Hip    Hip Extension Gross Movement (4-/5) good minus  -AS    Hip ABduction Gross Movement (4-/5) good minus  -AS      User Key  (r) = Recorded By, (t) = Taken By, (c) = Cosigned By    Initials Name Provider Type    AS Robinson Mcgovern, PT Physical Therapist                            PT Assessment/Plan        10/11/17 0600       PT Assessment    Assessment Comments Performed sidelying manual MET technique today for her right SI joint pain. Patient reports decreases in her pain following treatment this morning.  -AS     PT Plan    PT Plan Comments Continue with current treatment plan as tolerated by the patient.  -AS       User Key  (r) = Recorded By, (t) = Taken By, (c) = Cosigned By    Initials Name Provider Type    AS Robinson Mcgovern, PT Physical Therapist                Modalities       10/11/17 0600          Moist Heat    MH Applied Yes  -AS      Location Lumbar    patient seated  -AS      Rx Minutes 15 mins  -AS      ELECTRICAL STIMULATION    Attended/Unattended Unattended  -AS      Stimulation Type IFC  -AS      Location/Electrode Placement/Other Lumbar  -AS      Rx Minutes 15 mins  -AS      Traction 74418    Traction Type Lumbar  -AS      Rx Minutes 15  -AS      Duration Intermittent  -AS      Position Supine  -AS      Weight 80  -AS      Hold 60  -AS      Relax 10  -AS        User Key  (r) = Recorded By, (t) = Taken By, (c) = Cosigned By    Initials Name Provider Type    AS Robinson Mcgovern, PT Physical Therapist                Exercises       10/11/17 0600          Subjective Comments    Subjective Comments Patient states she is feeling ok this morning.  -AS      Exercise 1    Exercise Name 1 HS Stretch  -AS      Reps 1 10  -AS      Time (Seconds) 1 10 sec hold  -AS      Exercise 2    Exercise Name 2 Piriformis Stretch  -AS      Reps 2 10  -AS      Time (Seconds) 2 10 sec hold  -AS      Exercise 3    Exercise Name 3 Manual AP's and LE distraction  -AS      Time (Minutes) 3 5 min  -AS      Exercise 4    Exercise Name 4 Prone Press Ups with Over pressure  -AS      Reps 4 10  -AS      Time (Seconds) 4 5 sec hold  -AS      Exercise 5    Exercise Name 5 Prone Hip Extension  -AS      Reps 5 30  -AS      Exercise 6    Exercise Name 6 Manual Ext MET  -AS      Time (Minutes) 6 5 min  -AS      Exercise 7     Exercise Name 7 Unilateral press up on Right  -AS      Reps 7 10  -AS      Time (Seconds) 7 5 sec hold  -AS        User Key  (r) = Recorded By, (t) = Taken By, (c) = Cosigned By    Initials Name Provider Type    AS Robinson Mcgovern, PT Physical Therapist                                       Time Calculation:   Start Time: 0641  Stop Time: 0744  Time Calculation (min): 63 min    Therapy Charges for Today     Code Description Service Date Service Provider Modifiers Qty    68543652232  PT THER PROC EA 15 MIN 10/11/2017 Robinson Mcgovern, PT GP 1    [id number]  PT TRACTION LUMBAR 10/11/2017 Robinson Mcgovern, PT GP 1    [id number]  PT MANUAL THERAPY EA 15 MIN 10/11/2017 Robinson Mcgovern, PT GP 1                    Robinson Mcgovern, PT  10/11/2017

## 2017-10-16 ENCOUNTER — HOSPITAL ENCOUNTER (OUTPATIENT)
Dept: PHYSICAL THERAPY | Facility: HOSPITAL | Age: 57
Setting detail: THERAPIES SERIES
Discharge: HOME | End: 2017-10-16

## 2017-10-16 DIAGNOSIS — M54.16 LUMBAR RADICULOPATHY: Primary | ICD-10-CM

## 2017-10-16 PROCEDURE — 97110 THERAPEUTIC EXERCISES: CPT

## 2017-10-16 PROCEDURE — 97012 MECHANICAL TRACTION THERAPY: CPT

## 2017-10-16 PROCEDURE — 97140 MANUAL THERAPY 1/> REGIONS: CPT

## 2017-10-16 NOTE — THERAPY TREATMENT NOTE
Outpatient Physical Therapy Ortho Treatment Note  ERIN Orozco     Patient Name: Lilliam Portillo  : 1960  MRN: [id number]  Today's Date: 10/16/2017      Visit Date: 10/16/2017    Visit Dx:    ICD-10-CM ICD-9-CM   1. Lumbar radiculopathy M54.16 724.4       Patient Active Problem List   Diagnosis   • Atopic rhinitis   • Bronchitis   • Degeneration of intervertebral disc of cervical region   • Degeneration of intervertebral disc of lumbar region   • Depression   • Insomnia   • Atrophic vaginitis   • Prediabetes   • Diverticulitis of colon   • Pulmonary nodules   • History of diabetes mellitus resolved following bariatric surgery   • History of bariatric surgery   • Diverticulosis   • Acute cystitis   • Acute maxillary sinusitis   • Skull defect   • Lumbar radiculopathy, acute        Past Medical History:   Diagnosis Date   • Depression         Past Surgical History:   Procedure Laterality Date   • CARPAL TUNNEL RELEASE     •  SECTION     • FOOT SURGERY     • HYSTERECTOMY     • SHOULDER SURGERY     • TRIGGER FINGER RELEASE                               PT Assessment/Plan       10/16/17 0600       PT Assessment    Assessment Comments Patient continues to do well with PT with reports of improved pain and function at this time.  -AS     PT Plan    PT Plan Comments Continue with current treatment plan as tolerated by the patient.  -AS       User Key  (r) = Recorded By, (t) = Taken By, (c) = Cosigned By    Initials Name Provider Type    AS Robinson Mcgovern, PT Physical Therapist                Modalities       10/16/17 0600          Moist Heat    MH Applied Yes  -AS      Location Lumbar    patient seated  -AS      Rx Minutes 15 mins  -AS      ELECTRICAL STIMULATION    Attended/Unattended Unattended  -AS      Stimulation Type IFC  -AS      Location/Electrode Placement/Other Lumbar  -AS      Rx Minutes 15 mins  -AS      Traction 89117    Traction Type Lumbar  -AS      Rx Minutes 15  -AS      Position Supine   -AS      Weight 80  -AS      Hold 60  -AS      Relax 10  -AS        User Key  (r) = Recorded By, (t) = Taken By, (c) = Cosigned By    Initials Name Provider Type    AS Robinson Mcgovern, PT Physical Therapist                Exercises       10/16/17 0600          Subjective Comments    Subjective Comments Patient reports she is feeling about the same this morning.  -AS      Exercise 1    Exercise Name 1 HS Stretch  -AS      Reps 1 10  -AS      Time (Seconds) 1 10 sec hold  -AS      Exercise 2    Exercise Name 2 Piriformis Stretch  -AS      Reps 2 10  -AS      Time (Seconds) 2 10 sec hold  -AS      Exercise 3    Exercise Name 3 Manual AP's and LE distraction  -AS      Time (Minutes) 3 5 min  -AS      Exercise 4    Exercise Name 4 Prone Press Ups with Over pressure  -AS      Reps 4 10  -AS      Time (Seconds) 4 5 sec hold  -AS      Exercise 5    Exercise Name 5 Prone Hip Extension  -AS      Reps 5 30  -AS      Exercise 6    Exercise Name 6 Manual Ext MET  -AS      Time (Minutes) 6 5 min  -AS      Exercise 7    Exercise Name 7 Unilateral press up on Right  -AS      Reps 7 10  -AS      Time (Seconds) 7 5 sec hold  -AS        User Key  (r) = Recorded By, (t) = Taken By, (c) = Cosigned By    Initials Name Provider Type    AS Robinson Mcgovern, PT Physical Therapist                                       Time Calculation:   Start Time: 0639  Stop Time: 0738  Time Calculation (min): 59 min    Therapy Charges for Today     Code Description Service Date Service Provider Modifiers Qty    95952823349  PT THER PROC EA 15 MIN 10/16/2017 Robinson Mcgovern, PT GP 1    [id number]  PT MANUAL THERAPY EA 15 MIN 10/16/2017 Robinson Mcgovern, PT GP 1    [id number]  PT TRACTION LUMBAR 10/16/2017 Robinson Mcgovern, PT GP 1                    Robinson Mcgovern, PT  10/16/2017

## 2017-10-18 ENCOUNTER — HOSPITAL ENCOUNTER (OUTPATIENT)
Dept: PHYSICAL THERAPY | Facility: HOSPITAL | Age: 57
Setting detail: THERAPIES SERIES
Discharge: HOME | End: 2017-10-18

## 2017-10-18 DIAGNOSIS — M54.16 LUMBAR RADICULOPATHY: Primary | ICD-10-CM

## 2017-10-18 PROCEDURE — 97110 THERAPEUTIC EXERCISES: CPT

## 2017-10-18 PROCEDURE — 97012 MECHANICAL TRACTION THERAPY: CPT

## 2017-10-18 PROCEDURE — 97140 MANUAL THERAPY 1/> REGIONS: CPT

## 2017-10-18 NOTE — THERAPY TREATMENT NOTE
Outpatient Physical Therapy Ortho Treatment Note  ERIN Orozco     Patient Name: Lilliam Portillo  : 1960  MRN: [id number]  Today's Date: 10/18/2017      Visit Date: 10/18/2017    Visit Dx:    ICD-10-CM ICD-9-CM   1. Lumbar radiculopathy M54.16 724.4       Patient Active Problem List   Diagnosis   • Atopic rhinitis   • Bronchitis   • Degeneration of intervertebral disc of cervical region   • Degeneration of intervertebral disc of lumbar region   • Depression   • Insomnia   • Atrophic vaginitis   • Prediabetes   • Diverticulitis of colon   • Pulmonary nodules   • History of diabetes mellitus resolved following bariatric surgery   • History of bariatric surgery   • Diverticulosis   • Acute cystitis   • Acute maxillary sinusitis   • Skull defect   • Lumbar radiculopathy, acute        Past Medical History:   Diagnosis Date   • Depression         Past Surgical History:   Procedure Laterality Date   • CARPAL TUNNEL RELEASE     •  SECTION     • FOOT SURGERY     • HYSTERECTOMY     • SHOULDER SURGERY     • TRIGGER FINGER RELEASE                               PT Assessment/Plan       10/18/17 0600       PT Assessment    Assessment Comments Patient is doing well with PT with decreased reports of radicular pain into her LE's. Continued to progress patient with extension based exercises. Progressed patient with duration of lumbar mechanical traction.  -AS     PT Plan    PT Plan Comments Continue with current treatment plan as tolerated by the patient.  -AS       User Key  (r) = Recorded By, (t) = Taken By, (c) = Cosigned By    Initials Name Provider Type    AS Robinson Mcgovern, PT Physical Therapist                Modalities       10/18/17 0600          Moist Heat    MH Applied Yes  -AS      Location Lumbar    patient seated  -AS      Rx Minutes 15 mins  -AS      ELECTRICAL STIMULATION    Attended/Unattended Unattended  -AS      Stimulation Type IFC  -AS      Location/Electrode Placement/Other Lumbar  -AS       Rx Minutes 15 mins  -AS      Traction 21824    Traction Type Lumbar  -AS      Rx Minutes 15  -AS      Position Supine  -AS      Weight 80  -AS      Hold 60  -AS      Relax 10  -AS        User Key  (r) = Recorded By, (t) = Taken By, (c) = Cosigned By    Initials Name Provider Type    AS Robinson Mcgovern, PT Physical Therapist                Exercises       10/18/17 0600          Subjective Comments    Subjective Comments Patient states that she is doing pretty good this morning. She reports that she no longer has pain down her legs, but she states she continues to have the low back pain and discomfort.  -AS      Exercise 1    Exercise Name 1 HS Stretch  -AS      Reps 1 10  -AS      Time (Seconds) 1 10 sec hold  -AS      Exercise 2    Exercise Name 2 Piriformis Stretch  -AS      Reps 2 10  -AS      Time (Seconds) 2 10 sec hold  -AS      Exercise 3    Exercise Name 3 Manual AP's and LE distraction  -AS      Time (Minutes) 3 5 min  -AS      Additional Comments Grade I and II joint mobs for decreased pain and improved mobility   -AS      Exercise 4    Exercise Name 4 Prone Press Ups with Over pressure  -AS      Reps 4 10  -AS      Time (Seconds) 4 5 sec hold  -AS      Exercise 5    Exercise Name 5 Prone Hip Extension  -AS      Reps 5 30  -AS      Exercise 6    Exercise Name 6 Manual Ext MET  -AS      Time (Minutes) 6 5 min  -AS      Exercise 7    Exercise Name 7 Unilateral press up on Right  -AS      Reps 7 10  -AS      Time (Seconds) 7 5 sec hold  -AS        User Key  (r) = Recorded By, (t) = Taken By, (c) = Cosigned By    Initials Name Provider Type    AS Robinson Mcgovern, PT Physical Therapist                                       Time Calculation:   Start Time: 0637  Stop Time: 0747  Time Calculation (min): 70 min    Therapy Charges for Today     Code Description Service Date Service Provider Modifiers Qty    06275605853  PT THER PROC EA 15 MIN 10/18/2017 Robinson Mcgovern, PT GP 1    [id number]  PT  MANUAL THERAPY EA 15 MIN 10/18/2017 Robinson Mcgovern, PT GP 1    [id number] HC PT TRACTION LUMBAR 10/18/2017 Robinson Mcgovern, PT GP 1                    Robinson Mcgovern, PT  10/18/2017

## 2017-10-30 ENCOUNTER — APPOINTMENT (OUTPATIENT)
Dept: PHYSICAL THERAPY | Facility: HOSPITAL | Age: 57
End: 2017-10-30

## 2017-11-01 ENCOUNTER — HOSPITAL ENCOUNTER (OUTPATIENT)
Dept: PHYSICAL THERAPY | Facility: HOSPITAL | Age: 57
Setting detail: THERAPIES SERIES
Discharge: HOME | End: 2017-11-01

## 2017-11-01 DIAGNOSIS — M54.16 LUMBAR RADICULOPATHY: Primary | ICD-10-CM

## 2017-11-01 PROCEDURE — 97140 MANUAL THERAPY 1/> REGIONS: CPT

## 2017-11-01 PROCEDURE — 97110 THERAPEUTIC EXERCISES: CPT

## 2017-11-01 PROCEDURE — 97012 MECHANICAL TRACTION THERAPY: CPT

## 2017-11-01 NOTE — THERAPY TREATMENT NOTE
Outpatient Physical Therapy Ortho Treatment Note  ERIN Orozco     Patient Name: Lilliam Portillo  : 1960  MRN: [id number]  Today's Date: 2017      Visit Date: 2017    Visit Dx:    ICD-10-CM ICD-9-CM   1. Lumbar radiculopathy M54.16 724.4       Patient Active Problem List   Diagnosis   • Atopic rhinitis   • Bronchitis   • Degeneration of intervertebral disc of cervical region   • Degeneration of intervertebral disc of lumbar region   • Depression   • Insomnia   • Atrophic vaginitis   • Prediabetes   • Diverticulitis of colon   • Pulmonary nodules   • History of diabetes mellitus resolved following bariatric surgery   • History of bariatric surgery   • Diverticulosis   • Acute cystitis   • Acute maxillary sinusitis   • Skull defect   • Lumbar radiculopathy, acute        Past Medical History:   Diagnosis Date   • Depression         Past Surgical History:   Procedure Laterality Date   • CARPAL TUNNEL RELEASE     •  SECTION     • FOOT SURGERY     • HYSTERECTOMY     • SHOULDER SURGERY     • TRIGGER FINGER RELEASE                               PT Assessment/Plan       17 0600       PT Assessment    Assessment Comments Patient is doing well with PT with decreased reports of radicular pain into her LE's. Continued to progress patient with extension based exercises. Progressed patient with duration of lumbar mechanical traction.  -AS     PT Plan    PT Plan Comments Continue with current treatment plan as tolerated by the patient.  -AS       User Key  (r) = Recorded By, (t) = Taken By, (c) = Cosigned By    Initials Name Provider Type    AS Robinson Mcgovern, PT Physical Therapist                Modalities       17 0600          Moist Heat    MH Applied Yes  -AS      Location Lumbar    patient seated  -AS      Rx Minutes 15 mins  -AS      ELECTRICAL STIMULATION    Attended/Unattended Unattended  -AS      Stimulation Type IFC  -AS      Location/Electrode Placement/Other Lumbar  -AS       "Rx Minutes 15 mins  -AS      Traction 03106    Traction Type Lumbar  -AS      Rx Minutes 20  -AS      Duration Intermittent  -AS      Position Supine  -AS      Weight 80  -AS      Hold 60  -AS      Relax 10  -AS        User Key  (r) = Recorded By, (t) = Taken By, (c) = Cosigned By    Initials Name Provider Type    AS Robinson Mcgovern, PT Physical Therapist                Exercises       11/01/17 0600          Subjective Comments    Subjective Comments Patient states that she has been pretty much pain free all week. However, she states she can still \"feel it\" in her back but it is much much better than when she first started PT.  -AS      Exercise 1    Exercise Name 1 HS Stretch  -AS      Reps 1 10  -AS      Time (Seconds) 1 10 sec hold  -AS      Exercise 2    Exercise Name 2 Piriformis Stretch  -AS      Reps 2 10  -AS      Time (Seconds) 2 10 sec hold  -AS      Exercise 3    Exercise Name 3 Manual AP's and LE distraction  -AS      Time (Minutes) 3 5 min  -AS      Additional Comments Grade I and II joint mobs for decreased pain and improved mobility   -AS      Exercise 4    Exercise Name 4 Prone Press Ups with Over pressure  -AS      Reps 4 10  -AS      Time (Seconds) 4 5 sec hold  -AS      Exercise 5    Exercise Name 5 Prone Hip Extension  -AS      Reps 5 30  -AS      Exercise 6    Exercise Name 6 Manual Ext MET  -AS      Time (Minutes) 6 5 min  -AS      Additional Comments Patient in Left sidelying  -AS      Exercise 7    Exercise Name 7 Unilateral press up on Right  -AS      Reps 7 10  -AS      Time (Seconds) 7 5 sec hold  -AS      Exercise 8    Exercise Name 8 Prone trunk extensions  -AS      Reps 8 30  -AS        User Key  (r) = Recorded By, (t) = Taken By, (c) = Cosigned By    Initials Name Provider Type    AS Robinson Mcgovern, PT Physical Therapist                                       Time Calculation:   Start Time: 0637  Stop Time: 0751  Time Calculation (min): 74 min    Therapy Charges for Today     " Code Description Service Date Service Provider Modifiers Qty    42713640048  PT THER PROC EA 15 MIN 11/1/2017 Robinson Mcgovern, PT GP 1    [id number]  PT MANUAL THERAPY EA 15 MIN 11/1/2017 Robinson Mcgovern, PT GP 1    [id number]  PT TRACTION LUMBAR 11/1/2017 Robinson Mcgovern, PT GP 1                    Robinson Mcgovern, PT  11/1/2017

## 2017-11-07 DIAGNOSIS — M54.16 LUMBAR RADICULOPATHY, ACUTE: ICD-10-CM

## 2017-11-07 RX ORDER — NAPROXEN 500 MG/1
TABLET ORAL
Qty: 60 TABLET | Refills: 3 | Status: SHIPPED | OUTPATIENT
Start: 2017-11-07 | End: 2018-03-04 | Stop reason: SDUPTHER

## 2017-11-08 DIAGNOSIS — J45.20 MILD INTERMITTENT ASTHMA WITHOUT COMPLICATION: ICD-10-CM

## 2017-11-08 RX ORDER — ALBUTEROL SULFATE 2.5 MG/3ML
SOLUTION RESPIRATORY (INHALATION)
Qty: 75 ML | Refills: 0 | Status: SHIPPED | OUTPATIENT
Start: 2017-11-08 | End: 2017-11-11 | Stop reason: SDUPTHER

## 2017-11-11 DIAGNOSIS — J45.20 MILD INTERMITTENT ASTHMA WITHOUT COMPLICATION: ICD-10-CM

## 2017-11-13 RX ORDER — ALBUTEROL SULFATE 2.5 MG/3ML
SOLUTION RESPIRATORY (INHALATION)
Qty: 180 ML | Refills: 1 | Status: SHIPPED | OUTPATIENT
Start: 2017-11-13 | End: 2019-02-07

## 2017-11-15 ENCOUNTER — HOSPITAL ENCOUNTER (OUTPATIENT)
Dept: PHYSICAL THERAPY | Facility: HOSPITAL | Age: 57
Setting detail: THERAPIES SERIES
Discharge: HOME | End: 2017-11-15

## 2017-11-15 DIAGNOSIS — M54.16 LUMBAR RADICULOPATHY: Primary | ICD-10-CM

## 2017-11-15 PROCEDURE — 97140 MANUAL THERAPY 1/> REGIONS: CPT

## 2017-11-15 PROCEDURE — 97110 THERAPEUTIC EXERCISES: CPT

## 2017-11-15 PROCEDURE — 97012 MECHANICAL TRACTION THERAPY: CPT

## 2017-11-15 NOTE — THERAPY DISCHARGE NOTE
Outpatient Physical Therapy Ortho Treatment Note/Discharge Summary  ERIN [place]     Patient Name: Lilliam Portillo  : 1960  MRN: [id number]  Today's Date: 11/15/2017      Visit Date: 11/15/2017    Visit Dx:    ICD-10-CM ICD-9-CM   1. Lumbar radiculopathy M54.16 724.4       Patient Active Problem List   Diagnosis   • Atopic rhinitis   • Bronchitis   • Degeneration of intervertebral disc of cervical region   • Degeneration of intervertebral disc of lumbar region   • Depression   • Insomnia   • Atrophic vaginitis   • Prediabetes   • Diverticulitis of colon   • Pulmonary nodules   • History of diabetes mellitus resolved following bariatric surgery   • History of bariatric surgery   • Diverticulosis   • Acute cystitis   • Acute maxillary sinusitis   • Skull defect   • Lumbar radiculopathy, acute        Past Medical History:   Diagnosis Date   • Depression         Past Surgical History:   Procedure Laterality Date   • CARPAL TUNNEL RELEASE     •  SECTION     • FOOT SURGERY     • HYSTERECTOMY     • SHOULDER SURGERY     • TRIGGER FINGER RELEASE               PT Ortho       11/15/17 0600    Trunk    Flexion AROM WNL (0-80 degrees)  -AS    Extension AROM Deficit WNL  -AS    Lt Lat Flexion AROM Deficit WNL  -AS    Right Lateral Flexion AROM WNL (0-35 degrees)  -AS    Lt Rotation AROM Deficit WNL  -AS    Right Rotation AROM WNL (0-45 degrees)  -AS    Trunk    Trunk Flexion Gross Movement (4/5) good  -AS    Trunk Extension Gross Movement (4/5) good  -AS    Left Hip    Hip Extension Gross Movement (4/5) good  -AS    Hip ABduction Gross Movement (4/5) good  -AS    Right Hip    Hip Extension Gross Movement (4/5) good  -AS    Hip ABduction Gross Movement (4/5) good  -AS      User Key  (r) = Recorded By, (t) = Taken By, (c) = Cosigned By    Initials Name Provider Type    AS Robnison Mcgovern, PT Physical Therapist                            PT Assessment/Plan       11/15/17 0600       PT Assessment    Assessment  Comments Patient has responded well to PT treatment. Patient is reporting her radicular pain is gone, however she states she continues to have right SI joint pain that is unresolved with PT. Patient demonstrated improved bilateral hip and trunk strength as well as lumbar ROM WNL. Patient to retun to MD in December for f/u.  -AS     PT Plan    PT Plan Comments D/C patient from PT at this time.  -AS       User Key  (r) = Recorded By, (t) = Taken By, (c) = Cosigned By    Initials Name Provider Type    AS Robinson Mcgovern, PT Physical Therapist                Modalities       11/15/17 0600          Moist Heat    MH Applied Yes  -AS      Location Lumbar    patient seated  -AS      Rx Minutes 15 mins  -AS      ELECTRICAL STIMULATION    Attended/Unattended Unattended  -AS      Stimulation Type IFC  -AS      Location/Electrode Placement/Other Lumbar  -AS      Rx Minutes 15 mins  -AS      Traction 95386    Traction Type Lumbar  -AS      Rx Minutes 20  -AS      Duration Intermittent  -AS      Position Supine  -AS      Weight 80  -AS      Hold 60  -AS      Relax 10  -AS        User Key  (r) = Recorded By, (t) = Taken By, (c) = Cosigned By    Initials Name Provider Type    AS Robinson Mcgovern, PT Physical Therapist                Exercises       11/15/17 0600          Subjective Comments    Subjective Comments Patient reports that her   -AS      Exercise 1    Exercise Name 1 HS Stretch  -AS      Reps 1 10  -AS      Time (Seconds) 1 10 sec hold  -AS      Exercise 2    Exercise Name 2 Piriformis Stretch  -AS      Reps 2 10  -AS      Time (Seconds) 2 10 sec hold  -AS      Exercise 3    Exercise Name 3 Manual AP's and LE distraction  -AS      Time (Minutes) 3 5 min  -AS      Additional Comments Grade I and II joint mobs for decreased pain and improved mobility   -AS      Exercise 4    Exercise Name 4 Prone Press Ups with Over pressure  -AS      Reps 4 10  -AS      Time (Seconds) 4 5 sec hold  -AS      Exercise 5    Exercise  Name 5 Prone Hip Extension  -AS      Reps 5 30  -AS      Exercise 6    Exercise Name 6 Manual Ext MET  -AS      Time (Minutes) 6 5 min  -AS      Additional Comments Patient in Left sidelying  -AS      Exercise 7    Exercise Name 7 Unilateral press up on Right  -AS      Reps 7 10  -AS      Time (Seconds) 7 5 sec hold  -AS      Exercise 8    Exercise Name 8 Prone trunk extensions  -AS      Reps 8 30  -AS        User Key  (r) = Recorded By, (t) = Taken By, (c) = Cosigned By    Initials Name Provider Type    AS Robinson Mcgovern, PT Physical Therapist                               PT OP Goals       11/15/17 0600       PT Short Term Goals    STG 1 Patient to be compliant with her initial HEP.  -AS     STG 1 Progress Met  -AS     STG 2 Patient to improve her bilateral hip extension and abduction strength to 4-/5.   -AS     STG 2 Progress Met  -AS     STG 3 Patient to improve her trunk flexion and extension strength to 4-/5.  -AS     STG 3 Progress Met  -AS     STG 4 Patient to report improved radicular symptomsin RLE by 25-50%.  -AS     STG 4 Progress Met  -AS     STG 5 Patient to report worst pain on VAS of 4-5/10 with lying supine, prone, or sitting.  -AS     STG 5 Progress Met  -AS     Long Term Goals    LTG 1 Patient to be compliant with her advanced HEP.  -AS     LTG 1 Progress Met  -AS     LTG 2 Patient to improve her bilateral hip extensiona nd abduction strength to 4/5.   -AS     LTG 2 Progress Met  -AS     LTG 3 Patient to improve her trunk flexion and extension strength to 4/5.  -AS     LTG 3 Progress Met  -AS     LTG 4 Patient to report improved radicular symptomsin RLE by 50-75%.  -AS     LTG 4 Progress Met  -AS     LTG 5 Patient to report worst pain on VAS of 2-3/10 with lying supine, prone, or sitting.  -AS     LTG 5 Progress Partially Met  -AS     LTG 6 Patient to report improved function and decreased pain on Back Index by >10-15 points.  -AS     LTG 6 Progress Met  -AS       User Key  (r) = Recorded  By, (t) = Taken By, (c) = Cosigned By    Initials Name Provider Type    AS Robinson Mcgovern, PT Physical Therapist                    Time Calculation:   Start Time: 0641  Stop Time: 0749  Time Calculation (min): 68 min    Therapy Charges for Today     Code Description Service Date Service Provider Modifiers Qty    80840235158 HC PT THER PROC EA 15 MIN 11/15/2017 Robinson Mcgovern, PT GP 1    [id number] HC PT MANUAL THERAPY EA 15 MIN 11/15/2017 Robinson Mcgovern, PT GP 1    [id number]  PT TRACTION LUMBAR 11/15/2017 Robinson Mcgovern, PT GP 1                OP PT Discharge Summary  Date of Discharge: 11/15/17  Reason for Discharge: All goals achieved  Outcomes Achieved: Able to achieve all goals within established timeline  Discharge Destination: Home with home program  Discharge Instructions: Patient to continue her HEP at this time until she is seen by her MD for f/u.      Robinson Mcgovern, PT  11/15/2017

## 2017-11-26 DIAGNOSIS — M54.16 LUMBAR RADICULOPATHY, ACUTE: ICD-10-CM

## 2017-11-27 RX ORDER — BACLOFEN 10 MG/1
TABLET ORAL
Qty: 30 TABLET | Refills: 0 | Status: SHIPPED | OUTPATIENT
Start: 2017-11-27 | End: 2017-12-05 | Stop reason: SDUPTHER

## 2017-12-05 DIAGNOSIS — M54.16 LUMBAR RADICULOPATHY, ACUTE: ICD-10-CM

## 2017-12-05 RX ORDER — BACLOFEN 10 MG/1
TABLET ORAL
Qty: 30 TABLET | Refills: 2 | Status: SHIPPED | OUTPATIENT
Start: 2017-12-05 | End: 2018-01-03 | Stop reason: SDUPTHER

## 2017-12-14 ENCOUNTER — OFFICE VISIT (OUTPATIENT)
Dept: INTERNAL MEDICINE | Facility: CLINIC | Age: 57
End: 2017-12-14

## 2017-12-14 VITALS
WEIGHT: 170 LBS | HEIGHT: 62 IN | TEMPERATURE: 98.8 F | OXYGEN SATURATION: 97 % | DIASTOLIC BLOOD PRESSURE: 76 MMHG | SYSTOLIC BLOOD PRESSURE: 120 MMHG | HEART RATE: 76 BPM | BODY MASS INDEX: 31.28 KG/M2

## 2017-12-14 DIAGNOSIS — J30.9 CHRONIC ALLERGIC RHINITIS, UNSPECIFIED SEASONALITY, UNSPECIFIED TRIGGER: ICD-10-CM

## 2017-12-14 DIAGNOSIS — M54.16 LUMBAR RADICULOPATHY, ACUTE: ICD-10-CM

## 2017-12-14 DIAGNOSIS — R73.03 PREDIABETES: ICD-10-CM

## 2017-12-14 DIAGNOSIS — N95.2 ATROPHIC VAGINITIS: ICD-10-CM

## 2017-12-14 DIAGNOSIS — F32.A DEPRESSION, UNSPECIFIED DEPRESSION TYPE: Primary | ICD-10-CM

## 2017-12-14 DIAGNOSIS — G47.00 INSOMNIA, UNSPECIFIED TYPE: ICD-10-CM

## 2017-12-14 DIAGNOSIS — L70.0 ACNE VULGARIS: ICD-10-CM

## 2017-12-14 DIAGNOSIS — R91.8 PULMONARY NODULES: ICD-10-CM

## 2017-12-14 PROBLEM — J01.00 ACUTE MAXILLARY SINUSITIS: Status: RESOLVED | Noted: 2017-05-08 | Resolved: 2017-12-14

## 2017-12-14 PROCEDURE — 99214 OFFICE O/P EST MOD 30 MIN: CPT | Performed by: FAMILY MEDICINE

## 2017-12-14 RX ORDER — FLUOXETINE 20 MG/1
20 TABLET, FILM COATED ORAL DAILY
Qty: 30 TABLET | Refills: 2 | Status: SHIPPED | OUTPATIENT
Start: 2017-12-14 | End: 2018-03-15 | Stop reason: SDUPTHER

## 2017-12-14 RX ORDER — ADAPALENE AND BENZOYL PEROXIDE .1; 2.5 G/100G; G/100G
1 GEL TOPICAL NIGHTLY
Qty: 45 G | Refills: 5 | Status: SHIPPED | OUTPATIENT
Start: 2017-12-14 | End: 2018-04-18

## 2017-12-14 RX ORDER — LANOLIN ALCOHOL/MO/W.PET/CERES
1 CREAM (GRAM) TOPICAL DAILY
COMMUNITY

## 2017-12-14 NOTE — PROGRESS NOTES
Subjective     Lilliam Portillo is a 57 y.o. female, who presents with a chief complaint of   Chief Complaint   Patient presents with   • Prediabetes       Diabetes     Depression      1. Depression.  Pt reports she is doing okay with current treatment.  She is under increased life stress.  Mood a little down and sometimes she is weepy.  Feels okay in the morning.  Motivation down.  Appetite okay.  Denies SI.      2. Insomnia.  Trazodone is helping.  Able to sleep well with this.    3. Allergic rhinitis.  Reports symptoms are controlled with nasal steroid.    4. Back pain.  PT no longer helping.  Pain radiates down the right leg.    The following portions of the patient's history were reviewed and updated as appropriate: allergies, current medications, past family history, past medical history, past social history, past surgical history and problem list.    Allergies: Review of patient's allergies indicates no known allergies.    Review of Systems   Constitutional: Negative.    HENT: Negative.    Eyes: Negative.    Respiratory: Negative.    Cardiovascular: Negative.    Gastrointestinal: Negative.    Endocrine: Negative.    Genitourinary: Negative.    Musculoskeletal: Negative.    Skin: Negative.    Allergic/Immunologic: Positive for environmental allergies.   Neurological: Negative.    Hematological: Negative.    Psychiatric/Behavioral: Positive for sleep disturbance.       Objective     Wt Readings from Last 3 Encounters:   12/14/17 77.1 kg (170 lb)   09/13/17 78.6 kg (173 lb 3.2 oz)   08/30/17 79.4 kg (175 lb)     Temp Readings from Last 3 Encounters:   12/14/17 98.8 °F (37.1 °C)   09/13/17 98.7 °F (37.1 °C)   08/30/17 98.4 °F (36.9 °C)     BP Readings from Last 3 Encounters:   12/14/17 120/76   09/13/17 120/66   08/30/17 120/70     Pulse Readings from Last 3 Encounters:   12/14/17 76   09/13/17 79   08/30/17 76     Body mass index is 31.09 kg/(m^2).    Physical Exam   Constitutional: She is oriented to person, place,  and time. She appears well-developed and well-nourished.   HENT:   Head: Normocephalic.   Mouth/Throat: Oropharynx is clear and moist.   Eyes: Conjunctivae and EOM are normal. Pupils are equal, round, and reactive to light.   Neck: Normal range of motion. Neck supple. No thyromegaly present.   Cardiovascular: Normal rate, regular rhythm and normal heart sounds.    Pulmonary/Chest: Effort normal and breath sounds normal.   Abdominal: Soft. Bowel sounds are normal. There is no hepatosplenomegaly.   Musculoskeletal: Normal range of motion. She exhibits no edema.   Lymphadenopathy:     She has no cervical adenopathy.   Neurological: She is alert and oriented to person, place, and time.   Skin: Skin is warm and dry. No rash noted.   Psychiatric: She has a normal mood and affect. Her behavior is normal.   Vitals reviewed.    Assessment/Plan   Lilliam was seen today for prediabetes.    Diagnoses and all orders for this visit:    Depression, unspecified depression type  -     FLUoxetine (PROzac) 20 MG tablet; Take 1 tablet by mouth Daily for 90 days.  -     CBC & Differential; Future  -     TSH; Future  -     T4, Free; Future    Insomnia, unspecified type  -     Vitamin D 25 Hydroxy; Future    Chronic allergic rhinitis, unspecified seasonality, unspecified trigger    Atrophic vaginitis    Prediabetes  -     Comprehensive Metabolic Panel; Future  -     Lipid Panel With / Chol / HDL Ratio; Future  -     Hemoglobin A1c; Future  -     Vitamin B12; Future    Pulmonary nodules  -     CT Chest With Contrast; Future    Acne vulgaris  -     Adapalene-Benzoyl Peroxide 0.1-2.5 % gel; Apply 1 application topically Every Night.    Lumbar radiculopathy, acute  -     MRI Lumbar Spine Without Contrast; Future    1. Depression.  Not optimally controlled.  Continue bupropion.  Add fluoxetine.    2. Insomnia.  Benefiting from trazodone.  Continue same.    3. Allergic rhinitis. Continue nasal steroid.  The Nasonex doesn't work as well as  Omnaris.  She has been on Flonase as well; Omnaris was the most effective for her.    4. Atrophic vaginitis.  Doing well with estrogen cream.    5. Prediabetes.  Last A1c 5.8.  Continue lifestyle measures.  Labs ordered.    6. Pulmonary nodules.  Stable.  Repeat CT scan due now.      7. Acne.  Restart topical cream.    8. Lumbar radiculopathy.  No longer improving with PT.  Has taken medication.  Started > 3 months ago.  Check MRI.      Current Outpatient Prescriptions:   •  albuterol (PROVENTIL) (2.5 MG/3ML) 0.083% nebulizer solution, INHALE CONTENTS OF 1 VIAL BY MOUTH VIA NEBULIZER EVERY 4 HOURS AS NEEDED FOR WHEEZING, Disp: 180 mL, Rfl: 1  •  baclofen (LIORESAL) 10 MG tablet, TAKE 1 TABLET BY MOUTH THREE TIMES DAILY AS NEEDED FOR MUSCLE SPASMS, Disp: 30 tablet, Rfl: 2  •  Biotin 300 MCG tablet, Take  by mouth., Disp: , Rfl:   •  buPROPion XL (WELLBUTRIN XL) 300 MG 24 hr tablet, TAKE 1 TABLET BY MOUTH EVERY DAY, Disp: 30 tablet, Rfl: 6  •  Cholecalciferol (VITAMIN D3) 2000 UNITS tablet, Take 1 tablet by mouth Daily., Disp: , Rfl:   •  ciclesonide (OMNARIS) 50 MCG/ACT nasal spray, 2 sprays by Each Nare route Daily., Disp: 1 each, Rfl: 11  •  estrogens, conjugated,-methyltestosterone (ESTRATEST HS) 0.625-1.25 MG per tablet, Take 1 tablet by mouth Daily., Disp: , Rfl:   •  HYDROcodone-acetaminophen (NORCO) 5-325 MG per tablet, Take 1-2 tablets by mouth At Night As Needed for Moderate Pain  or Severe Pain ., Disp: 20 tablet, Rfl: 0  •  Multiple Vitamin (MULTI VITAMIN) tablet, Take  by mouth Daily., Disp: , Rfl:   •  naproxen (NAPROSYN) 500 MG tablet, TAKE 1 TABLET BY MOUTH TWICE DAILY WITH MEALS, Disp: 60 tablet, Rfl: 3  •  Nutritional Supplements (DHEA PO), Take 1 tablet by mouth Daily., Disp: , Rfl:   •  traZODone (DESYREL) 50 MG tablet, TAKE 1 TABLET BY MOUTH EVERY NIGHT AT BEDTIME AS NEEDED, Disp: 30 tablet, Rfl: 6  •  Adapalene-Benzoyl Peroxide 0.1-2.5 % gel, Apply 1 application topically Every Night., Disp: 45  g, Rfl: 5  •  FLUoxetine (PROzac) 20 MG tablet, Take 1 tablet by mouth Daily for 90 days., Disp: 30 tablet, Rfl: 2  Medications Discontinued During This Encounter   Medication Reason   • albuterol (PROVENTIL) (2.5 MG/3ML) 0.083% nebulizer solution Therapy completed   • estrogens conjugated, synthetic A, (CENESTIN) 0.3 MG tablet Therapy completed       Return in about 6 months (around 6/14/2018).

## 2017-12-19 ENCOUNTER — RESULTS ENCOUNTER (OUTPATIENT)
Dept: INTERNAL MEDICINE | Facility: CLINIC | Age: 57
End: 2017-12-19

## 2017-12-19 DIAGNOSIS — R73.03 PREDIABETES: ICD-10-CM

## 2017-12-19 DIAGNOSIS — F32.A DEPRESSION, UNSPECIFIED DEPRESSION TYPE: ICD-10-CM

## 2017-12-19 DIAGNOSIS — G47.00 INSOMNIA, UNSPECIFIED TYPE: ICD-10-CM

## 2017-12-21 LAB
25(OH)D3+25(OH)D2 SERPL-MCNC: 59.5 NG/ML
ALBUMIN SERPL-MCNC: 4.1 G/DL (ref 3.5–5.2)
ALBUMIN/GLOB SERPL: 1.8 G/DL
ALP SERPL-CCNC: 69 U/L (ref 40–129)
ALT SERPL-CCNC: 21 U/L (ref 5–33)
AST SERPL-CCNC: 18 U/L (ref 5–32)
BASOPHILS # BLD AUTO: 0.04 10*3/MM3 (ref 0–0.2)
BASOPHILS NFR BLD AUTO: 0.8 % (ref 0–2)
BILIRUB SERPL-MCNC: 0.4 MG/DL (ref 0.2–1.2)
BUN SERPL-MCNC: 17 MG/DL (ref 6–20)
BUN/CREAT SERPL: 22.7 (ref 7–25)
CALCIUM SERPL-MCNC: 9.1 MG/DL (ref 8.6–10.5)
CHLORIDE SERPL-SCNC: 105 MMOL/L (ref 98–107)
CHOLEST SERPL-MCNC: 193 MG/DL (ref 0–200)
CHOLEST/HDLC SERPL: 2.51 {RATIO}
CO2 SERPL-SCNC: 28 MMOL/L (ref 22–29)
CREAT SERPL-MCNC: 0.75 MG/DL (ref 0.57–1)
EOSINOPHIL # BLD AUTO: 0.11 10*3/MM3 (ref 0.1–0.3)
EOSINOPHIL NFR BLD AUTO: 2.2 % (ref 0–4)
ERYTHROCYTE [DISTWIDTH] IN BLOOD BY AUTOMATED COUNT: 12.8 % (ref 11.5–14.5)
GLOBULIN SER CALC-MCNC: 2.3 GM/DL
GLUCOSE SERPL-MCNC: 96 MG/DL (ref 65–99)
HBA1C MFR BLD: 5.7 % (ref 4.8–5.6)
HCT VFR BLD AUTO: 41.4 % (ref 37–47)
HDLC SERPL-MCNC: 77 MG/DL (ref 40–60)
HGB BLD-MCNC: 13.7 G/DL (ref 12–16)
IMM GRANULOCYTES # BLD: 0.01 10*3/MM3 (ref 0–0.03)
IMM GRANULOCYTES NFR BLD: 0.2 % (ref 0–0.5)
LDLC SERPL CALC-MCNC: 97 MG/DL (ref 0–100)
LYMPHOCYTES # BLD AUTO: 1.41 10*3/MM3 (ref 0.6–4.8)
LYMPHOCYTES NFR BLD AUTO: 28.7 % (ref 20–45)
MCH RBC QN AUTO: 30.6 PG (ref 27–31)
MCHC RBC AUTO-ENTMCNC: 33.1 G/DL (ref 31–37)
MCV RBC AUTO: 92.6 FL (ref 81–99)
MONOCYTES # BLD AUTO: 0.36 10*3/MM3 (ref 0–1)
MONOCYTES NFR BLD AUTO: 7.3 % (ref 3–8)
NEUTROPHILS # BLD AUTO: 2.98 10*3/MM3 (ref 1.5–8.3)
NEUTROPHILS NFR BLD AUTO: 60.8 % (ref 45–70)
NRBC BLD AUTO-RTO: 0 /100 WBC (ref 0–0)
PLATELET # BLD AUTO: 291 10*3/MM3 (ref 140–500)
POTASSIUM SERPL-SCNC: 4.6 MMOL/L (ref 3.5–5.2)
PROT SERPL-MCNC: 6.4 G/DL (ref 6–8.5)
RBC # BLD AUTO: 4.47 10*6/MM3 (ref 4.2–5.4)
SODIUM SERPL-SCNC: 143 MMOL/L (ref 136–145)
T4 FREE SERPL-MCNC: 1.1 NG/DL (ref 0.93–1.7)
TRIGL SERPL-MCNC: 93 MG/DL (ref 0–150)
TSH SERPL DL<=0.005 MIU/L-ACNC: 2.39 MIU/ML (ref 0.27–4.2)
VIT B12 SERPL-MCNC: 688 PG/ML (ref 232–1245)
VLDLC SERPL CALC-MCNC: 18.6 MG/DL (ref 7–27)
WBC # BLD AUTO: 4.91 10*3/MM3 (ref 4.8–10.8)

## 2017-12-27 ENCOUNTER — HOSPITAL ENCOUNTER (OUTPATIENT)
Dept: MRI IMAGING | Facility: HOSPITAL | Age: 57
Discharge: HOME | End: 2017-12-27
Attending: FAMILY MEDICINE | Admitting: FAMILY MEDICINE

## 2017-12-27 ENCOUNTER — HOSPITAL ENCOUNTER (OUTPATIENT)
Dept: CT IMAGING | Facility: HOSPITAL | Age: 57
Discharge: HOME | End: 2017-12-27
Attending: FAMILY MEDICINE

## 2017-12-27 DIAGNOSIS — R91.8 PULMONARY NODULES: ICD-10-CM

## 2017-12-27 DIAGNOSIS — M54.16 LUMBAR RADICULOPATHY, ACUTE: ICD-10-CM

## 2017-12-27 PROCEDURE — 0 IOPAMIDOL PER 1 ML: Performed by: FAMILY MEDICINE

## 2017-12-27 PROCEDURE — 72148 MRI LUMBAR SPINE W/O DYE: CPT

## 2017-12-27 PROCEDURE — 71260 CT THORAX DX C+: CPT

## 2017-12-27 RX ADMIN — IOPAMIDOL 100 ML: 755 INJECTION, SOLUTION INTRAVENOUS at 10:29

## 2018-01-03 DIAGNOSIS — M48.061 NEURAL FORAMINAL STENOSIS OF LUMBAR SPINE: Primary | ICD-10-CM

## 2018-01-03 DIAGNOSIS — M54.16 LUMBAR RADICULOPATHY, ACUTE: ICD-10-CM

## 2018-01-03 RX ORDER — BACLOFEN 10 MG/1
TABLET ORAL
Qty: 30 TABLET | Refills: 5 | Status: SHIPPED | OUTPATIENT
Start: 2018-01-03 | End: 2018-01-29 | Stop reason: HOSPADM

## 2018-01-03 RX ORDER — ESTRADIOL 0.1 MG/G
CREAM VAGINAL
Qty: 42.5 G | Refills: 5 | Status: SHIPPED | OUTPATIENT
Start: 2018-01-03 | End: 2018-06-14

## 2018-01-03 RX ORDER — BUPROPION HYDROCHLORIDE 300 MG/1
TABLET ORAL
Qty: 30 TABLET | Refills: 5 | Status: SHIPPED | OUTPATIENT
Start: 2018-01-03 | End: 2018-08-02 | Stop reason: SDUPTHER

## 2018-01-29 ENCOUNTER — OFFICE VISIT (OUTPATIENT)
Dept: NEUROSURGERY | Facility: CLINIC | Age: 58
End: 2018-01-29

## 2018-01-29 VITALS
BODY MASS INDEX: 32.09 KG/M2 | HEIGHT: 62 IN | DIASTOLIC BLOOD PRESSURE: 71 MMHG | WEIGHT: 174.4 LBS | SYSTOLIC BLOOD PRESSURE: 115 MMHG | HEART RATE: 68 BPM

## 2018-01-29 DIAGNOSIS — M48.062 SPINAL STENOSIS, LUMBAR REGION, WITH NEUROGENIC CLAUDICATION: ICD-10-CM

## 2018-01-29 DIAGNOSIS — M51.36 DEGENERATION OF INTERVERTEBRAL DISC OF LUMBAR REGION: Primary | ICD-10-CM

## 2018-01-29 DIAGNOSIS — M54.16 LUMBAR RADICULOPATHY: ICD-10-CM

## 2018-01-29 PROCEDURE — 99243 OFF/OP CNSLTJ NEW/EST LOW 30: CPT | Performed by: PHYSICIAN ASSISTANT

## 2018-01-29 NOTE — PROGRESS NOTES
Subjective   Patient ID: Lilliam Portillo is a 57 y.o. female is being seen for consultation today at the request of León Sanabria MD  for back and right leg pain. She states she fell while getting out of the shower two years ago landing on her right side. She states her leg pain started 5 months ago. She completed PT which did helped.  Mrs. Portillo takes Naproxen 500 mg BID for pain.     Back Pain   This is a recurrent problem. The current episode started more than 1 year ago (2 years ). The problem occurs intermittently. The problem has been gradually worsening since onset. The pain is present in the lumbar spine. The quality of the pain is described as aching. The pain radiates to the right foot, right thigh and right knee. The pain is worse during the day. Exacerbated by: walking  Associated symptoms include headaches, leg pain and numbness. Pertinent negatives include no bladder incontinence, bowel incontinence, fever, perianal numbness, tingling or weakness. She has tried home exercises, heat, ice and muscle relaxant (PT ) for the symptoms. The treatment provided no relief.   Leg Pain    The pain is present in the right leg. The quality of the pain is described as burning and aching. The pain is at a severity of 5/10. The pain is moderate. The pain has been intermittent since onset. Associated symptoms include numbness. Pertinent negatives include no tingling. Exacerbated by: Walking  The treatment provided no relief.       The following portions of the patient's history were reviewed and updated as appropriate: allergies, current medications, past family history, past medical history, past social history, past surgical history and problem list.    Review of Systems   Constitutional: Negative for fever.   Gastrointestinal: Negative for bowel incontinence.   Genitourinary: Negative for bladder incontinence and difficulty urinating.   Musculoskeletal: Positive for back pain (right leg pain ) and neck pain.    Neurological: Positive for tremors, numbness and headaches. Negative for tingling and weakness.   All other systems reviewed and are negative.      Objective   Physical Exam   Constitutional: She is oriented to person, place, and time. She appears well-developed and well-nourished.   HENT:   Head: Normocephalic and atraumatic.   Right Ear: External ear normal.   Left Ear: External ear normal.   Eyes: Conjunctivae and EOM are normal. Pupils are equal, round, and reactive to light. Right eye exhibits no discharge. Left eye exhibits no discharge.   Neck: Normal range of motion. Neck supple. No tracheal deviation present.   Pulmonary/Chest: Effort normal. No stridor. No respiratory distress.   Musculoskeletal: Normal range of motion. She exhibits no edema, tenderness or deformity.   Neurological: She is alert and oriented to person, place, and time. She has normal strength and normal reflexes. She displays no atrophy, no tremor and normal reflexes. No cranial nerve deficit or sensory deficit. She exhibits normal muscle tone. She displays a negative Romberg sign. She displays no seizure activity. Coordination and gait normal.   No long tract signs   Skin: Skin is warm and dry.   Psychiatric: She has a normal mood and affect. Her behavior is normal. Judgment and thought content normal.   Nursing note and vitals reviewed.    Neurologic Exam     Mental Status   Oriented to person, place, and time.     Cranial Nerves     CN III, IV, VI   Pupils are equal, round, and reactive to light.  Extraocular motions are normal.     Motor Exam     Strength   Strength 5/5 throughout.       Assessment/Plan   Independent Review of Radiographic Studies:    I did review the lumbar spine MRI from December 27.  It shows multilevel disc degeneration with mild to moderate central stenosis at L3-L4 and L4-L5.  There is at least moderate neural foraminal narrowing bilaterally at both levels.  However there is more severe foraminal narrowing on  the right at L5-S1 secondary to facet arthropathy.  Medical Decision Making:    Ms. Portillo was referred to us by Dr. Sanabria for a 6 month history of right buttock and lateral leg pain.  The patient fell 2 years ago and has had numbness and tingling in the right lateral leg since that time as well as more chronic low back pain.  However the radicular pain began without accident or injury he initially the back and radicular pain was quite severe, about an 8 on the VAS.  She went to physical therapy from September to December 2017 which did help significantly.  The pain is now more mild and about a 2-3 on the VAS.  She describes the pain as burning and intermittent.  It worsens with repetitive or prolonged activity and does improve with rest.    She is currently using naproxen, baclofen for pain.    I did review the MRI findings with her.  Her radicular pain is likely secondary to the stenosis and neural foraminal narrowing at L4-L5 and the foraminal narrowing at L5-S1.  At this point however her radicular symptoms are mild and she does not have any deficits.  We discussed epidurals as an option and also discussed a myelogram as an option if the radicular symptoms got much worse or she began to experience any weakness.  At this point she does not think any additional treatment is necessary and will call as needed.  Lilliam was seen today for back pain and leg pain.    Diagnoses and all orders for this visit:    Degeneration of intervertebral disc of lumbar region    Lumbar radiculopathy    Spinal stenosis, lumbar region, with neurogenic claudication    Return if symptoms worsen or fail to improve.

## 2018-02-05 RX ORDER — BUPROPION HYDROCHLORIDE 300 MG/1
TABLET ORAL
Qty: 30 TABLET | Refills: 5 | Status: SHIPPED | OUTPATIENT
Start: 2018-02-05 | End: 2018-04-18

## 2018-03-04 DIAGNOSIS — M54.16 LUMBAR RADICULOPATHY, ACUTE: ICD-10-CM

## 2018-03-05 RX ORDER — NAPROXEN 500 MG/1
TABLET ORAL
Qty: 60 TABLET | Refills: 5 | Status: SHIPPED | OUTPATIENT
Start: 2018-03-05 | End: 2019-01-23 | Stop reason: SDUPTHER

## 2018-03-15 DIAGNOSIS — F32.A DEPRESSION, UNSPECIFIED DEPRESSION TYPE: ICD-10-CM

## 2018-03-15 RX ORDER — FLUOXETINE 20 MG/1
TABLET, FILM COATED ORAL
Qty: 30 TABLET | Refills: 3 | Status: SHIPPED | OUTPATIENT
Start: 2018-03-15 | End: 2018-07-14 | Stop reason: SDUPTHER

## 2018-03-22 RX ORDER — ESTRADIOL 0.1 MG/G
CREAM VAGINAL
Qty: 42.5 G | Refills: 1 | Status: SHIPPED | OUTPATIENT
Start: 2018-03-22 | End: 2018-04-18

## 2018-04-04 ENCOUNTER — TELEPHONE (OUTPATIENT)
Dept: ORTHOPEDIC SURGERY | Facility: CLINIC | Age: 58
End: 2018-04-04

## 2018-04-04 NOTE — TELEPHONE ENCOUNTER
PATIENT IS SCHEDULED FOR AN APPOINTMENT [facility]:  DR HEAD @ [address] [phone] 06/01/18 @ 2. ARRIVE 15 MINS EARLY. PATIENT HAS BEEN NOTIFIED WITH APPT DETAIL.

## 2018-04-18 ENCOUNTER — OFFICE VISIT (OUTPATIENT)
Dept: INTERNAL MEDICINE | Facility: CLINIC | Age: 58
End: 2018-04-18

## 2018-04-18 VITALS
HEART RATE: 67 BPM | SYSTOLIC BLOOD PRESSURE: 128 MMHG | BODY MASS INDEX: 33.31 KG/M2 | TEMPERATURE: 99.1 F | RESPIRATION RATE: 20 BRPM | WEIGHT: 181 LBS | DIASTOLIC BLOOD PRESSURE: 64 MMHG | HEIGHT: 62 IN | OXYGEN SATURATION: 98 %

## 2018-04-18 DIAGNOSIS — H69.82 EUSTACHIAN TUBE DYSFUNCTION, LEFT: ICD-10-CM

## 2018-04-18 DIAGNOSIS — J30.9 CHRONIC ALLERGIC RHINITIS, UNSPECIFIED SEASONALITY, UNSPECIFIED TRIGGER: Primary | ICD-10-CM

## 2018-04-18 PROCEDURE — 99213 OFFICE O/P EST LOW 20 MIN: CPT | Performed by: FAMILY MEDICINE

## 2018-04-18 RX ORDER — MONTELUKAST SODIUM 10 MG/1
10 TABLET ORAL NIGHTLY
Qty: 30 TABLET | Refills: 5 | Status: SHIPPED | OUTPATIENT
Start: 2018-04-18 | End: 2018-10-07 | Stop reason: SDUPTHER

## 2018-04-18 NOTE — PROGRESS NOTES
Lilliam Portillo is a 57 y.o. female, who presents with a chief complaint of   Chief Complaint   Patient presents with   • Dizziness     fluid in ears x few months       HPI     Pt c/o a feeling of fullness and fluid in both ears, especially the left X several months.  Also has itching, sneezing, watery eyes, runny nose.  She started using Omnaris and taking Allegra about 2 weeks ago.  This has helped a little.      The following portions of the patient's history were reviewed and updated as appropriate: allergies, current medications, past family history, past medical history, past social history, past surgical history and problem list.    Allergies: Review of patient's allergies indicates no known allergies.    Review of Systems   Constitutional: Negative for fever.   HENT: Positive for postnasal drip, rhinorrhea and sneezing. Negative for ear pain (fullness).    Eyes: Positive for itching.   Respiratory: Negative.    Cardiovascular: Negative.    Gastrointestinal: Negative.    Endocrine: Negative.    Genitourinary: Negative.    Musculoskeletal: Negative.    Skin: Negative.    Allergic/Immunologic: Positive for environmental allergies.   Neurological: Negative.    Hematological: Negative.    Psychiatric/Behavioral: Negative.              Wt Readings from Last 3 Encounters:   04/18/18 82.1 kg (181 lb)   01/29/18 79.1 kg (174 lb 6.4 oz)   12/14/17 77.1 kg (170 lb)     Temp Readings from Last 3 Encounters:   04/18/18 99.1 °F (37.3 °C)   12/14/17 98.8 °F (37.1 °C)   09/13/17 98.7 °F (37.1 °C)     BP Readings from Last 3 Encounters:   04/18/18 128/64   01/29/18 115/71   12/14/17 120/76     Pulse Readings from Last 3 Encounters:   04/18/18 67   01/29/18 68   12/14/17 76     Body mass index is 33.1 kg/m².  @LASTSAO2(3)@    Physical Exam   Constitutional: She is oriented to person, place, and time. She appears well-developed and well-nourished.   HENT:   Head: Normocephalic and atraumatic.   Left Ear: A middle ear  effusion is present.   Eyes: Conjunctivae and EOM are normal.   Neck: Neck supple. No thyromegaly present.   Cardiovascular: Normal rate, regular rhythm and normal heart sounds.    Pulmonary/Chest: Effort normal and breath sounds normal. No respiratory distress. She has no wheezes.   Musculoskeletal: Normal range of motion. She exhibits no edema.   Neurological: She is alert and oriented to person, place, and time.   Skin: Skin is warm and dry.   Psychiatric: She has a normal mood and affect. Her behavior is normal.   Nursing note and vitals reviewed.      Results for orders placed or performed in visit on 12/19/17   Comprehensive Metabolic Panel   Result Value Ref Range    Glucose 96 65 - 99 mg/dL    BUN 17 6 - 20 mg/dL    Creatinine 0.75 0.57 - 1.00 mg/dL    eGFR Non African Am 80 >60 mL/min/1.73    eGFR African Am 97 >60 mL/min/1.73    BUN/Creatinine Ratio 22.7 7.0 - 25.0    Sodium 143 136 - 145 mmol/L    Potassium 4.6 3.5 - 5.2 mmol/L    Chloride 105 98 - 107 mmol/L    Total CO2 28.0 22.0 - 29.0 mmol/L    Calcium 9.1 8.6 - 10.5 mg/dL    Total Protein 6.4 6.0 - 8.5 g/dL    Albumin 4.10 3.50 - 5.20 g/dL    Globulin 2.3 gm/dL    A/G Ratio 1.8 g/dL    Total Bilirubin 0.4 0.2 - 1.2 mg/dL    Alkaline Phosphatase 69 40 - 129 U/L    AST (SGOT) 18 5 - 32 U/L    ALT (SGPT) 21 5 - 33 U/L   Lipid Panel With / Chol / HDL Ratio   Result Value Ref Range    Total Cholesterol 193 0 - 200 mg/dL    Triglycerides 93 0 - 150 mg/dL    HDL Cholesterol 77 (H) 40 - 60 mg/dL    VLDL Cholesterol 18.6 7 - 27 mg/dL    LDL Cholesterol  97 0 - 100 mg/dL    Chol/HDL Ratio 2.51    Hemoglobin A1c   Result Value Ref Range    Hemoglobin A1C 5.70 (H) 4.80 - 5.60 %   TSH   Result Value Ref Range    TSH 2.390 0.270 - 4.200 mIU/mL   T4, Free   Result Value Ref Range    Free T4 1.10 0.93 - 1.70 ng/dL   Vitamin B12   Result Value Ref Range    Vitamin B-12 688 232 - 1,245 pg/mL   Vitamin D 25 Hydroxy   Result Value Ref Range    25 Hydroxy, Vitamin D 59.5  ng/mL   CBC & Differential   Result Value Ref Range    WBC 4.91 4.80 - 10.80 10*3/mm3    RBC 4.47 4.20 - 5.40 10*6/mm3    Hemoglobin 13.7 12.0 - 16.0 g/dL    Hematocrit 41.4 37.0 - 47.0 %    MCV 92.6 81.0 - 99.0 fL    MCH 30.6 27.0 - 31.0 pg    MCHC 33.1 31.0 - 37.0 g/dL    RDW 12.8 11.5 - 14.5 %    Platelets 291 140 - 500 10*3/mm3    Neutrophil Rel % 60.8 45.0 - 70.0 %    Lymphocyte Rel % 28.7 20.0 - 45.0 %    Monocyte Rel % 7.3 3.0 - 8.0 %    Eosinophil Rel % 2.2 0.0 - 4.0 %    Basophil Rel % 0.8 0.0 - 2.0 %    Neutrophils Absolute 2.98 1.50 - 8.30 10*3/mm3    Lymphocytes Absolute 1.41 0.60 - 4.80 10*3/mm3    Monocytes Absolute 0.36 0.00 - 1.00 10*3/mm3    Eosinophils Absolute 0.11 0.10 - 0.30 10*3/mm3    Basophils Absolute 0.04 0.00 - 0.20 10*3/mm3    Immature Granulocyte Rel % 0.2 0.0 - 0.5 %    Immature Grans Absolute 0.01 0.00 - 0.03 10*3/mm3    nRBC 0.0 0.0 - 0.0 /100 WBC           Lilliam was seen today for dizziness.    Diagnoses and all orders for this visit:    Chronic allergic rhinitis, unspecified seasonality, unspecified trigger  -     montelukast (SINGULAIR) 10 MG tablet; Take 1 tablet by mouth Every Night.    Eustachian tube dysfunction, left    Continue nasal steroid and antihistamine.  Add montelukast.  Add pseudoephedrine X 1-2 weeks. Anticipatory guidance given.      Outpatient Medications Prior to Visit   Medication Sig Dispense Refill   • albuterol (PROVENTIL) (2.5 MG/3ML) 0.083% nebulizer solution INHALE CONTENTS OF 1 VIAL BY MOUTH VIA NEBULIZER EVERY 4 HOURS AS NEEDED FOR WHEEZING 180 mL 1   • Biotin 300 MCG tablet Take  by mouth.     • buPROPion XL (WELLBUTRIN XL) 300 MG 24 hr tablet TAKE 1 TABLET BY MOUTH EVERY DAY 30 tablet 5   • Cholecalciferol (VITAMIN D3) 2000 UNITS tablet Take 1 tablet by mouth Daily.     • ciclesonide (OMNARIS) 50 MCG/ACT nasal spray 2 sprays by Each Nare route Daily. 1 each 11   • ESTRACE VAGINAL 0.1 MG/GM vaginal cream USE 2 GRAMS AT BEDTIME FOR 2 WEEKS, THEN USE  TWICE WEEKLY AS DIRECTED 42.5 g 5   • estrogens, conjugated,-methyltestosterone (ESTRATEST HS) 0.625-1.25 MG per tablet Take 1 tablet by mouth Daily.     • FLUoxetine (PROzac) 20 MG tablet TAKE 1 TABLET BY MOUTH DAILY 30 tablet 3   • HYDROcodone-acetaminophen (NORCO) 5-325 MG per tablet Take 1-2 tablets by mouth At Night As Needed for Moderate Pain  or Severe Pain . 20 tablet 0   • Multiple Vitamin (MULTI VITAMIN) tablet Take  by mouth Daily.     • naproxen (NAPROSYN) 500 MG tablet TAKE 1 TABLET BY MOUTH TWICE DAILY WITH MEALS 60 tablet 5   • Nutritional Supplements (DHEA PO) Take 1 tablet by mouth Daily.     • traZODone (DESYREL) 50 MG tablet TAKE 1 TABLET BY MOUTH EVERY NIGHT AT BEDTIME AS NEEDED 30 tablet 6   • Adapalene-Benzoyl Peroxide 0.1-2.5 % gel Apply 1 application topically Every Night. 45 g 5   • buPROPion XL (WELLBUTRIN XL) 300 MG 24 hr tablet TAKE 1 TABLET BY MOUTH DAILY 30 tablet 5   • ESTRACE VAGINAL 0.1 MG/GM vaginal cream INSERT 2 GRAMS VAGINALLY 2 TIMES A WEEK 42.5 g 1     No facility-administered medications prior to visit.      New Medications Ordered This Visit   Medications   • montelukast (SINGULAIR) 10 MG tablet     Sig: Take 1 tablet by mouth Every Night.     Dispense:  30 tablet     Refill:  5     [unfilled]  Medications Discontinued During This Encounter   Medication Reason   • Adapalene-Benzoyl Peroxide 0.1-2.5 % gel *Therapy completed   • buPROPion XL (WELLBUTRIN XL) 300 MG 24 hr tablet *Therapy completed   • ESTRACE VAGINAL 0.1 MG/GM vaginal cream *Therapy completed         Return for Next scheduled follow up.

## 2018-05-29 ENCOUNTER — TRANSCRIBE ORDERS (OUTPATIENT)
Dept: INTERNAL MEDICINE | Facility: CLINIC | Age: 58
End: 2018-05-29

## 2018-05-29 DIAGNOSIS — Z13.9 SCREENING FOR CONDITION: Primary | ICD-10-CM

## 2018-06-13 ENCOUNTER — HOSPITAL ENCOUNTER (OUTPATIENT)
Dept: MAMMOGRAPHY | Facility: HOSPITAL | Age: 58
Discharge: HOME | End: 2018-06-13
Attending: FAMILY MEDICINE | Admitting: FAMILY MEDICINE

## 2018-06-13 DIAGNOSIS — Z13.9 SCREENING FOR CONDITION: ICD-10-CM

## 2018-06-13 PROCEDURE — 77067 SCR MAMMO BI INCL CAD: CPT

## 2018-06-13 PROCEDURE — 77063 BREAST TOMOSYNTHESIS BI: CPT

## 2018-06-14 ENCOUNTER — OFFICE VISIT (OUTPATIENT)
Dept: INTERNAL MEDICINE | Facility: CLINIC | Age: 58
End: 2018-06-14

## 2018-06-14 VITALS
OXYGEN SATURATION: 98 % | WEIGHT: 177.2 LBS | HEIGHT: 62 IN | DIASTOLIC BLOOD PRESSURE: 68 MMHG | RESPIRATION RATE: 16 BRPM | BODY MASS INDEX: 32.61 KG/M2 | SYSTOLIC BLOOD PRESSURE: 112 MMHG | HEART RATE: 70 BPM | TEMPERATURE: 98.5 F

## 2018-06-14 DIAGNOSIS — G47.00 INSOMNIA, UNSPECIFIED TYPE: ICD-10-CM

## 2018-06-14 DIAGNOSIS — R91.8 PULMONARY NODULES: ICD-10-CM

## 2018-06-14 DIAGNOSIS — F32.A DEPRESSION, UNSPECIFIED DEPRESSION TYPE: Primary | ICD-10-CM

## 2018-06-14 DIAGNOSIS — N95.2 ATROPHIC VAGINITIS: ICD-10-CM

## 2018-06-14 DIAGNOSIS — R73.03 PREDIABETES: ICD-10-CM

## 2018-06-14 PROCEDURE — 99214 OFFICE O/P EST MOD 30 MIN: CPT | Performed by: FAMILY MEDICINE

## 2018-06-14 NOTE — PROGRESS NOTES
Subjective     Lilliam Portillo is a 57 y.o. female, who presents with a chief complaint of   Chief Complaint   Patient presents with   • Allergies   • Depression       Diabetes     Depression   Allergies        1. Depression. Added fluoxetine lasts time.  Also takes bupropion XL.  She reports her symptoms are well-controlled.    2. Insomnia.  Trazodone is helping.  Able to sleep well with this.    3. Allergic rhinitis.  Reports symptoms are controlled with nasal steroid.    4. Atrophic vaginitis.  Pt having a hard time affording estrogen cream.  Has taken Premarin in the past and would like to restart this.    The following portions of the patient's history were reviewed and updated as appropriate: allergies, current medications, past family history, past medical history, past social history, past surgical history and problem list.    Allergies: Patient has no known allergies.    Review of Systems   Constitutional: Negative.    HENT: Negative.    Eyes: Negative.    Respiratory: Negative.    Cardiovascular: Negative.    Gastrointestinal: Negative.    Endocrine: Negative.    Genitourinary: Positive for vaginal pain.   Musculoskeletal: Negative.    Skin: Negative.    Allergic/Immunologic: Positive for environmental allergies.   Neurological: Negative.    Hematological: Negative.    Psychiatric/Behavioral: Positive for sleep disturbance.       Objective     Wt Readings from Last 3 Encounters:   06/14/18 80.4 kg (177 lb 3.2 oz)   04/18/18 82.1 kg (181 lb)   01/29/18 79.1 kg (174 lb 6.4 oz)     Temp Readings from Last 3 Encounters:   06/14/18 98.5 °F (36.9 °C)   04/18/18 99.1 °F (37.3 °C)   12/14/17 98.8 °F (37.1 °C)     BP Readings from Last 3 Encounters:   06/14/18 112/68   04/18/18 128/64   01/29/18 115/71     Pulse Readings from Last 3 Encounters:   06/14/18 70   04/18/18 67   01/29/18 68     Body mass index is 32.41 kg/m².    Physical Exam   Constitutional: She is oriented to person, place, and time. She appears  well-developed and well-nourished.   HENT:   Head: Normocephalic and atraumatic.   Mouth/Throat: Oropharynx is clear and moist.   Eyes: Conjunctivae and EOM are normal.   Neck: Neck supple. No thyromegaly present.   Cardiovascular: Normal rate, regular rhythm and normal heart sounds.    Pulmonary/Chest: Effort normal and breath sounds normal.   Abdominal: Soft. Bowel sounds are normal. There is no hepatosplenomegaly.   Musculoskeletal: Normal range of motion. She exhibits no edema.   Neurological: She is alert and oriented to person, place, and time.   Skin: Skin is warm and dry. No rash noted.   Psychiatric: She has a normal mood and affect. Her behavior is normal.   Nursing note and vitals reviewed.    Assessment/Plan   Lilliam was seen today for allergies and depression.    Diagnoses and all orders for this visit:    Depression, unspecified depression type  -     CBC & Differential; Future  -     TSH; Future    Insomnia, unspecified type    Atrophic vaginitis  -     estrogens, conjugated, (PREMARIN) 0.3 MG tablet; Take 1 tablet by mouth Daily. Take daily for 21 days then do not take for 7 days.    Prediabetes  -     Comprehensive Metabolic Panel; Future  -     Hemoglobin A1c; Future  -     Lipid Panel With / Chol / HDL Ratio; Future  -     Vitamin B12; Future    Pulmonary nodules  -     CT Chest With Contrast; Future    1. Depression.  Controlled.  Continue bupropion and fluoxetine.    2. Insomnia.  Benefiting from trazodone.  Continue same.    3. Allergic rhinitis. Continue nasal steroid; Omnaris works the best for her.    4. Atrophic vaginitis.  Pt cannot afford estrogen cream.  Took Premarin in the past and did well with it.  We discussed risks and benefits.  She is UTD on mammogram.  Start Premarin 0.3 mg.  Consider higher dose if needed.    5. Prediabetes.  Last A1c 5.7.  Continue lifestyle measures.  Labs next time.    6. Pulmonary nodule.  Needs one more CT chest in July to document two years of  stability.    Current Outpatient Prescriptions:   •  albuterol (PROVENTIL) (2.5 MG/3ML) 0.083% nebulizer solution, INHALE CONTENTS OF 1 VIAL BY MOUTH VIA NEBULIZER EVERY 4 HOURS AS NEEDED FOR WHEEZING, Disp: 180 mL, Rfl: 1  •  Biotin 300 MCG tablet, Take  by mouth., Disp: , Rfl:   •  buPROPion XL (WELLBUTRIN XL) 300 MG 24 hr tablet, TAKE 1 TABLET BY MOUTH EVERY DAY, Disp: 30 tablet, Rfl: 5  •  Cholecalciferol (VITAMIN D3) 2000 UNITS tablet, Take 1 tablet by mouth Daily., Disp: , Rfl:   •  ciclesonide (OMNARIS) 50 MCG/ACT nasal spray, 2 sprays by Each Nare route Daily., Disp: 1 each, Rfl: 11  •  FLUoxetine (PROzac) 20 MG tablet, TAKE 1 TABLET BY MOUTH DAILY, Disp: 30 tablet, Rfl: 3  •  HYDROcodone-acetaminophen (NORCO) 5-325 MG per tablet, Take 1-2 tablets by mouth At Night As Needed for Moderate Pain  or Severe Pain ., Disp: 20 tablet, Rfl: 0  •  montelukast (SINGULAIR) 10 MG tablet, Take 1 tablet by mouth Every Night., Disp: 30 tablet, Rfl: 5  •  Multiple Vitamin (MULTI VITAMIN) tablet, Take  by mouth Daily., Disp: , Rfl:   •  naproxen (NAPROSYN) 500 MG tablet, TAKE 1 TABLET BY MOUTH TWICE DAILY WITH MEALS, Disp: 60 tablet, Rfl: 5  •  Nutritional Supplements (DHEA PO), Take 1 tablet by mouth Daily., Disp: , Rfl:   •  traZODone (DESYREL) 50 MG tablet, TAKE 1 TABLET BY MOUTH EVERY NIGHT AT BEDTIME AS NEEDED, Disp: 30 tablet, Rfl: 6  •  estrogens, conjugated, (PREMARIN) 0.3 MG tablet, Take 1 tablet by mouth Daily. Take daily for 21 days then do not take for 7 days., Disp: 30 tablet, Rfl: 5  Medications Discontinued During This Encounter   Medication Reason   • estrogens, conjugated,-methyltestosterone (ESTRATEST HS) 0.625-1.25 MG per tablet    • ESTRACE VAGINAL 0.1 MG/GM vaginal cream        Return in about 6 months (around 12/14/2018).

## 2018-06-20 ENCOUNTER — HOSPITAL ENCOUNTER (OUTPATIENT)
Dept: CT IMAGING | Facility: HOSPITAL | Age: 58
Discharge: HOME | End: 2018-06-20
Attending: FAMILY MEDICINE | Admitting: FAMILY MEDICINE

## 2018-06-20 ENCOUNTER — APPOINTMENT (OUTPATIENT)
Dept: CT IMAGING | Facility: HOSPITAL | Age: 58
End: 2018-06-20
Attending: FAMILY MEDICINE

## 2018-06-20 DIAGNOSIS — R91.8 PULMONARY NODULES: ICD-10-CM

## 2018-06-20 PROCEDURE — 71260 CT THORAX DX C+: CPT

## 2018-06-20 PROCEDURE — 0 IOPAMIDOL PER 1 ML: Performed by: FAMILY MEDICINE

## 2018-06-20 RX ADMIN — IOPAMIDOL 100 ML: 755 INJECTION, SOLUTION INTRAVENOUS at 13:40

## 2018-06-22 ENCOUNTER — TELEPHONE (OUTPATIENT)
Dept: INTERNAL MEDICINE | Facility: CLINIC | Age: 58
End: 2018-06-22

## 2018-06-22 NOTE — TELEPHONE ENCOUNTER
----- Message from León Sanabria MD sent at 6/21/2018 11:05 AM EDT -----  Radiologist says her lung nodules are unchanged and benign.  No further imaging recommended.     Pt given info.dg

## 2018-07-05 ENCOUNTER — TELEPHONE (OUTPATIENT)
Dept: INTERNAL MEDICINE | Facility: CLINIC | Age: 58
End: 2018-07-05

## 2018-07-05 DIAGNOSIS — N95.2 ATROPHIC VAGINITIS: ICD-10-CM

## 2018-07-09 RX ORDER — TRAZODONE HYDROCHLORIDE 50 MG/1
TABLET ORAL
Qty: 90 TABLET | Refills: 1 | Status: SHIPPED | OUTPATIENT
Start: 2018-07-09 | End: 2019-02-07

## 2018-07-12 RX ORDER — HYDROCORTISONE ACETATE 25 MG/1
25 SUPPOSITORY RECTAL 2 TIMES DAILY PRN
Qty: 24 EACH | Refills: 0 | Status: SHIPPED | OUTPATIENT
Start: 2018-07-12 | End: 2018-12-20

## 2018-07-13 ENCOUNTER — TELEPHONE (OUTPATIENT)
Dept: INTERNAL MEDICINE | Facility: CLINIC | Age: 58
End: 2018-07-13

## 2018-07-13 NOTE — TELEPHONE ENCOUNTER
PT  AWARE      ----- Message from León Wood MD sent at 7/12/2018  5:34 PM EDT -----  Regarding: FW: HEMPATIENCE  Contact: [phone]  done  ----- Message -----  From: Ynes Win MA  Sent: 7/11/2018   3:57 PM  To: León Wood MD  Subject: FW: HEMHEROIDDS                                      ----- Message -----  From: Kalin Domínguez  Sent: 7/11/2018   3:53 PM  To: [facility]  Subject: HEMPATIENCE WOOD PT    Patient said that she has pain & itching, no blood. She cannot come in this week or until Thursday next week, because she is out of town.  Please call something in to     dem[facility] rivas    Please call pt    Thanks!  kalin

## 2018-07-14 DIAGNOSIS — F32.A DEPRESSION, UNSPECIFIED DEPRESSION TYPE: ICD-10-CM

## 2018-07-16 RX ORDER — FLUOXETINE 20 MG/1
TABLET, FILM COATED ORAL
Qty: 90 TABLET | Refills: 1 | Status: SHIPPED | OUTPATIENT
Start: 2018-07-16 | End: 2018-07-20 | Stop reason: SDUPTHER

## 2018-07-20 DIAGNOSIS — F32.A DEPRESSION, UNSPECIFIED DEPRESSION TYPE: ICD-10-CM

## 2018-07-20 RX ORDER — FLUOXETINE 20 MG/1
20 TABLET, FILM COATED ORAL DAILY
Qty: 90 TABLET | Refills: 1 | Status: SHIPPED | OUTPATIENT
Start: 2018-07-20 | End: 2018-08-10 | Stop reason: SDUPTHER

## 2018-08-02 RX ORDER — BUPROPION HYDROCHLORIDE 300 MG/1
TABLET ORAL
Qty: 90 TABLET | Refills: 1 | Status: SHIPPED | OUTPATIENT
Start: 2018-08-02 | End: 2019-01-23 | Stop reason: SDUPTHER

## 2018-08-10 DIAGNOSIS — F32.A DEPRESSION, UNSPECIFIED DEPRESSION TYPE: ICD-10-CM

## 2018-08-10 RX ORDER — FLUOXETINE 20 MG/1
TABLET, FILM COATED ORAL
Qty: 90 TABLET | Refills: 1 | Status: SHIPPED | OUTPATIENT
Start: 2018-08-10 | End: 2019-02-07

## 2018-10-07 DIAGNOSIS — J30.9 CHRONIC ALLERGIC RHINITIS: ICD-10-CM

## 2018-10-08 RX ORDER — MONTELUKAST SODIUM 10 MG/1
10 TABLET ORAL NIGHTLY
Qty: 90 TABLET | Refills: 1 | Status: SHIPPED | OUTPATIENT
Start: 2018-10-08 | End: 2018-12-20

## 2018-12-13 LAB
ALBUMIN SERPL-MCNC: 4.2 G/DL (ref 3.5–5.5)
ALBUMIN/GLOB SERPL: 1.7 {RATIO} (ref 1.2–2.2)
ALP SERPL-CCNC: 76 IU/L (ref 39–117)
ALT SERPL-CCNC: 18 IU/L (ref 0–32)
AMBIG ABBREV CMP14 DEFAULT: NORMAL
AST SERPL-CCNC: 20 IU/L (ref 0–40)
BASOPHILS # BLD AUTO: 0 X10E3/UL (ref 0–0.2)
BASOPHILS NFR BLD AUTO: 1 %
BILIRUB SERPL-MCNC: 0.4 MG/DL (ref 0–1.2)
BUN SERPL-MCNC: 13 MG/DL (ref 6–24)
BUN/CREAT SERPL: 18 (ref 9–23)
CALCIUM SERPL-MCNC: 9.6 MG/DL (ref 8.7–10.2)
CHLORIDE SERPL-SCNC: 101 MMOL/L (ref 96–106)
CHOLEST SERPL-MCNC: 219 MG/DL (ref 100–199)
CHOLEST/HDLC SERPL: 2.9 RATIO (ref 0–4.4)
CO2 SERPL-SCNC: 23 MMOL/L (ref 20–29)
CREAT SERPL-MCNC: 0.74 MG/DL (ref 0.57–1)
EOSINOPHIL # BLD AUTO: 0.2 X10E3/UL (ref 0–0.4)
EOSINOPHIL NFR BLD AUTO: 4 %
ERYTHROCYTE [DISTWIDTH] IN BLOOD BY AUTOMATED COUNT: 13.6 % (ref 12.3–15.4)
GLOBULIN SER CALC-MCNC: 2.5 G/DL (ref 1.5–4.5)
GLUCOSE SERPL-MCNC: 113 MG/DL (ref 65–99)
HBA1C MFR BLD: 5.9 % (ref 4.8–5.6)
HCT VFR BLD AUTO: 42.7 % (ref 34–46.6)
HDLC SERPL-MCNC: 76 MG/DL
HGB BLD-MCNC: 14.2 G/DL (ref 11.1–15.9)
IMM GRANULOCYTES # BLD: 0 X10E3/UL (ref 0–0.1)
IMM GRANULOCYTES NFR BLD: 0 %
LDLC SERPL CALC-MCNC: 119 MG/DL (ref 0–99)
LYMPHOCYTES # BLD AUTO: 1.8 X10E3/UL (ref 0.7–3.1)
LYMPHOCYTES NFR BLD AUTO: 36 %
MCH RBC QN AUTO: 30.5 PG (ref 26.6–33)
MCHC RBC AUTO-ENTMCNC: 33.3 G/DL (ref 31.5–35.7)
MCV RBC AUTO: 92 FL (ref 79–97)
MONOCYTES # BLD AUTO: 0.4 X10E3/UL (ref 0.1–0.9)
MONOCYTES NFR BLD AUTO: 8 %
NEUTROPHILS # BLD AUTO: 2.6 X10E3/UL (ref 1.4–7)
NEUTROPHILS NFR BLD AUTO: 51 %
PLATELET # BLD AUTO: 327 X10E3/UL (ref 150–379)
POTASSIUM SERPL-SCNC: 4.5 MMOL/L (ref 3.5–5.2)
PROT SERPL-MCNC: 6.7 G/DL (ref 6–8.5)
RBC # BLD AUTO: 4.65 X10E6/UL (ref 3.77–5.28)
SODIUM SERPL-SCNC: 142 MMOL/L (ref 134–144)
TRIGL SERPL-MCNC: 119 MG/DL (ref 0–149)
TSH SERPL DL<=0.005 MIU/L-ACNC: 2.64 UIU/ML (ref 0.45–4.5)
VIT B12 SERPL-MCNC: 602 PG/ML (ref 232–1245)
VLDLC SERPL CALC-MCNC: 24 MG/DL (ref 5–40)
WBC # BLD AUTO: 4.9 X10E3/UL (ref 3.4–10.8)

## 2018-12-20 ENCOUNTER — OFFICE VISIT (OUTPATIENT)
Dept: INTERNAL MEDICINE | Facility: CLINIC | Age: 58
End: 2018-12-20

## 2018-12-20 VITALS
OXYGEN SATURATION: 98 % | BODY MASS INDEX: 33.49 KG/M2 | HEIGHT: 62 IN | SYSTOLIC BLOOD PRESSURE: 110 MMHG | TEMPERATURE: 99.1 F | HEART RATE: 76 BPM | RESPIRATION RATE: 16 BRPM | WEIGHT: 182 LBS | DIASTOLIC BLOOD PRESSURE: 70 MMHG

## 2018-12-20 DIAGNOSIS — F32.A DEPRESSION, UNSPECIFIED DEPRESSION TYPE: Primary | ICD-10-CM

## 2018-12-20 DIAGNOSIS — N95.2 ATROPHIC VAGINITIS: ICD-10-CM

## 2018-12-20 DIAGNOSIS — R73.03 PREDIABETES: ICD-10-CM

## 2018-12-20 DIAGNOSIS — J30.9 ALLERGIC RHINITIS, UNSPECIFIED SEASONALITY, UNSPECIFIED TRIGGER: ICD-10-CM

## 2018-12-20 DIAGNOSIS — R91.8 PULMONARY NODULES: ICD-10-CM

## 2018-12-20 DIAGNOSIS — Z23 NEED FOR HEPATITIS A VACCINATION: ICD-10-CM

## 2018-12-20 DIAGNOSIS — Z00.00 ROUTINE HEALTH MAINTENANCE: ICD-10-CM

## 2018-12-20 DIAGNOSIS — G47.00 INSOMNIA, UNSPECIFIED TYPE: ICD-10-CM

## 2018-12-20 PROCEDURE — 99214 OFFICE O/P EST MOD 30 MIN: CPT | Performed by: FAMILY MEDICINE

## 2018-12-20 PROCEDURE — 90471 IMMUNIZATION ADMIN: CPT | Performed by: FAMILY MEDICINE

## 2018-12-20 PROCEDURE — 90632 HEPA VACCINE ADULT IM: CPT | Performed by: FAMILY MEDICINE

## 2018-12-20 NOTE — PROGRESS NOTES
Subjective     Lilliam Portillo is a 58 y.o. female, who presents with a chief complaint of   Chief Complaint   Patient presents with   • Depression       Allergies     Depression   Diabetes        1. Depression.  Takes bupropion and fluoxetine. She reports her symptoms are well-controlled.    2. Insomnia.  Trazodone is helping.  Able to sleep well with this.    3. Allergic rhinitis.  Reports symptoms are controlled with nasal steroid.    4. Atrophic vaginitis.  Couldn't afford estrogen cream and started premarin last time.  She says this helps.    The following portions of the patient's history were reviewed and updated as appropriate: allergies, current medications, past family history, past medical history, past social history, past surgical history and problem list.    Allergies: Patient has no known allergies.    Review of Systems   Constitutional: Negative.    HENT: Negative.    Eyes: Negative.    Respiratory: Negative.    Cardiovascular: Negative.    Gastrointestinal: Negative.    Endocrine: Negative.    Genitourinary: Negative.    Musculoskeletal: Negative.    Skin: Negative.    Allergic/Immunologic: Positive for environmental allergies.   Neurological: Negative.    Hematological: Negative.    Psychiatric/Behavioral: Positive for sleep disturbance.       Objective     Wt Readings from Last 3 Encounters:   12/20/18 82.6 kg (182 lb)   06/14/18 80.4 kg (177 lb 3.2 oz)   04/18/18 82.1 kg (181 lb)     Temp Readings from Last 3 Encounters:   12/20/18 99.1 °F (37.3 °C)   06/14/18 98.5 °F (36.9 °C)   04/18/18 99.1 °F (37.3 °C)     BP Readings from Last 3 Encounters:   12/20/18 110/70   06/14/18 112/68   04/18/18 128/64     Pulse Readings from Last 3 Encounters:   12/20/18 76   06/14/18 70   04/18/18 67     Body mass index is 33.29 kg/m².    Physical Exam   Constitutional: She is oriented to person, place, and time. She appears well-developed and well-nourished.   HENT:   Head: Normocephalic and atraumatic.    Mouth/Throat: Oropharynx is clear and moist.   Eyes: Conjunctivae and EOM are normal.   Neck: Neck supple. No thyromegaly present.   Cardiovascular: Normal rate, regular rhythm and normal heart sounds.   Pulmonary/Chest: Effort normal and breath sounds normal.   Abdominal: Soft. Bowel sounds are normal. There is no hepatosplenomegaly.   Musculoskeletal: Normal range of motion. She exhibits no edema.   Neurological: She is alert and oriented to person, place, and time.   Skin: Skin is warm and dry. No rash noted.   Psychiatric: She has a normal mood and affect. Her behavior is normal.   Nursing note and vitals reviewed.    Assessment/Plan   Lilliam was seen today for depression.    Diagnoses and all orders for this visit:    Depression, unspecified depression type  -     CBC & Differential; Future  -     TSH; Future    Insomnia, unspecified type    Allergic rhinitis, unspecified seasonality, unspecified trigger    Atrophic vaginitis    Prediabetes  -     Comprehensive Metabolic Panel; Future  -     Hemoglobin A1c; Future  -     Lipid Panel With / Chol / HDL Ratio; Future    Pulmonary nodules    Routine health maintenance  -     Vitamin D 25 Hydroxy; Future    1. Depression.  Controlled.  Continue bupropion and fluoxetine.    2. Insomnia.  Benefiting from trazodone.  Continue same.    3. Allergic rhinitis. Continue nasal steroid; Omnaris works the best for her.    4. Atrophic vaginitis.  Controlled with Premarin. We discussed risks and benefits.  She is UTD on mammogram.     5. Prediabetes.  Last A1c 5.9, up from 5.7.  Continue lifestyle measures.  Labs reviewed.    6. Pulmonary nodule.  Two years of stability documented.  Radiologist recommended no further imaging.    7. Routine health maint.  Declines flu vaccine.  Requests hep A vaccine today.  Next dose in 6 months.  Mammogram UTD.  Doesn't need a pap smear.    Current Outpatient Medications:   •  albuterol (PROVENTIL) (2.5 MG/3ML) 0.083% nebulizer solution,  INHALE CONTENTS OF 1 VIAL BY MOUTH VIA NEBULIZER EVERY 4 HOURS AS NEEDED FOR WHEEZING, Disp: 180 mL, Rfl: 1  •  Biotin 300 MCG tablet, Take  by mouth., Disp: , Rfl:   •  buPROPion XL (WELLBUTRIN XL) 300 MG 24 hr tablet, TAKE 1 TABLET BY MOUTH DAILY, Disp: 90 tablet, Rfl: 1  •  Cholecalciferol (VITAMIN D3) 2000 UNITS tablet, Take 1 tablet by mouth Daily., Disp: , Rfl:   •  ciclesonide (OMNARIS) 50 MCG/ACT nasal spray, 2 sprays by Each Nare route Daily., Disp: 1 each, Rfl: 11  •  estrogens, conjugated, (PREMARIN) 0.625 MG tablet, Take 1 tablet by mouth Daily. Take daily for 21 days then do not take for 7 days., Disp: 30 tablet, Rfl: 5  •  FLUoxetine (PROzac) 20 MG tablet, TAKE 1 TABLET BY MOUTH DAILY, Disp: 90 tablet, Rfl: 1  •  HYDROcodone-acetaminophen (NORCO) 5-325 MG per tablet, Take 1-2 tablets by mouth At Night As Needed for Moderate Pain  or Severe Pain ., Disp: 20 tablet, Rfl: 0  •  Multiple Vitamin (MULTI VITAMIN) tablet, Take  by mouth Daily., Disp: , Rfl:   •  naproxen (NAPROSYN) 500 MG tablet, TAKE 1 TABLET BY MOUTH TWICE DAILY WITH MEALS, Disp: 60 tablet, Rfl: 5  •  Nutritional Supplements (DHEA PO), Take 1 tablet by mouth Daily., Disp: , Rfl:   •  traZODone (DESYREL) 50 MG tablet, TAKE 1 TABLET BY MOUTH EVERY NIGHT AT BEDTIME AS NEEDED, Disp: 90 tablet, Rfl: 1  Medications Discontinued During This Encounter   Medication Reason   • hydrocortisone (ANUSOL-HC) 25 MG suppository *Therapy completed   • montelukast (SINGULAIR) 10 MG tablet *Therapy completed       Return in about 6 months (around 6/20/2019).

## 2018-12-28 RX ORDER — ADAPALENE AND BENZOYL PEROXIDE .1; 2.5 G/100G; G/100G
GEL TOPICAL
Qty: 45 G | Refills: 3 | Status: SHIPPED | OUTPATIENT
Start: 2018-12-28 | End: 2019-02-07

## 2019-01-23 DIAGNOSIS — M54.16 LUMBAR RADICULOPATHY, ACUTE: ICD-10-CM

## 2019-01-23 RX ORDER — NAPROXEN 500 MG/1
TABLET ORAL
Qty: 60 TABLET | Refills: 4 | Status: SHIPPED | OUTPATIENT
Start: 2019-01-23 | End: 2019-02-07

## 2019-01-23 RX ORDER — BUPROPION HYDROCHLORIDE 300 MG/1
TABLET ORAL
Qty: 30 TABLET | Refills: 4 | Status: SHIPPED | OUTPATIENT
Start: 2019-01-23 | End: 2019-02-07

## 2019-01-31 ENCOUNTER — DOCUMENTATION (OUTPATIENT)
Dept: INTERNAL MEDICINE | Facility: CLINIC | Age: 59
End: 2019-01-31

## 2019-01-31 DIAGNOSIS — R10.13 EPIGASTRIC PAIN: Primary | ICD-10-CM

## 2019-02-01 ENCOUNTER — TELEPHONE (OUTPATIENT)
Dept: INTERNAL MEDICINE | Facility: CLINIC | Age: 59
End: 2019-02-01

## 2019-02-01 NOTE — TELEPHONE ENCOUNTER
"Order done,   appt made         ----- Message from León Sanabria MD sent at 1/31/2019  2:32 PM EST -----  I can see her this Monday (the 4th).  In the meantime, could we get a right upper quadrant ultrasound done at [facility]?  Back to ER if new or worsening symptoms.  ER records would be helpful.  Thanks.  ----- Message -----  From: Jaz Sanderson CMA  Sent: 1/31/2019  10:18 AM  To: León Sanabria MD, Mariella Walton MA    Patient was at [facility] last night. Patient states abd pain, CT was \"negative\" for gallstones. Patient states she is still in severe pain. I scheduled patient apt 2/11/19- next available. Patient asked 2 x times for a sooner apt. I advised I would call the hospital and get records so Dr. Sanabria could review.       "

## 2019-02-04 ENCOUNTER — OFFICE VISIT (OUTPATIENT)
Dept: INTERNAL MEDICINE | Facility: CLINIC | Age: 59
End: 2019-02-04

## 2019-02-04 VITALS
SYSTOLIC BLOOD PRESSURE: 130 MMHG | HEART RATE: 66 BPM | DIASTOLIC BLOOD PRESSURE: 70 MMHG | OXYGEN SATURATION: 98 % | TEMPERATURE: 98.3 F

## 2019-02-04 DIAGNOSIS — R10.9 ABDOMINAL PAIN, UNSPECIFIED ABDOMINAL LOCATION: Primary | ICD-10-CM

## 2019-02-04 DIAGNOSIS — R10.11 RUQ PAIN: ICD-10-CM

## 2019-02-04 DIAGNOSIS — N15.9 KIDNEY INFECTION: Primary | ICD-10-CM

## 2019-02-04 DIAGNOSIS — K80.21 CALCULUS OF GALLBLADDER WITH BILIARY OBSTRUCTION BUT WITHOUT CHOLECYSTITIS: ICD-10-CM

## 2019-02-04 DIAGNOSIS — H57.89 ITCH OF RIGHT EYE: ICD-10-CM

## 2019-02-04 PROBLEM — K80.20 CALCULUS OF GALLBLADDER WITHOUT CHOLECYSTITIS: Status: ACTIVE | Noted: 2019-02-04

## 2019-02-04 LAB
BILIRUB BLD-MCNC: ABNORMAL MG/DL
CLARITY, POC: ABNORMAL
COLOR UR: YELLOW
GLUCOSE UR STRIP-MCNC: NEGATIVE MG/DL
KETONES UR QL: ABNORMAL
LEUKOCYTE EST, POC: NEGATIVE
NITRITE UR-MCNC: NEGATIVE MG/ML
PH UR: 5.5 [PH] (ref 5–8)
PROT UR STRIP-MCNC: NEGATIVE MG/DL
RBC # UR STRIP: NEGATIVE /UL
SP GR UR: 1.02 (ref 1–1.03)
UROBILINOGEN UR QL: NORMAL

## 2019-02-04 PROCEDURE — 81003 URINALYSIS AUTO W/O SCOPE: CPT | Performed by: FAMILY MEDICINE

## 2019-02-04 PROCEDURE — 99213 OFFICE O/P EST LOW 20 MIN: CPT | Performed by: FAMILY MEDICINE

## 2019-02-04 RX ORDER — DICYCLOMINE HCL 20 MG
20 TABLET ORAL EVERY 6 HOURS
COMMUNITY
End: 2022-03-14

## 2019-02-04 RX ORDER — ONDANSETRON 4 MG/1
4 TABLET, FILM COATED ORAL EVERY 8 HOURS PRN
COMMUNITY
End: 2020-04-09

## 2019-02-04 NOTE — PROGRESS NOTES
Lilliam Portillo is a 58 y.o. female, who presents with a chief complaint of   Chief Complaint   Patient presents with   • Abdominal Pain     ER; US done on Friday   • Eye Problem       HPI     1. Pt presents with 5 days of RUQ pain that radiates through to the back.  It was severe and she went to the ER where CT scan was negative.  She feels a little better today but is taking dicyclomine, ondansetron, and some pain medication.  Denies fever, nausea, vomiting, diarrhea.    2. Right eye itching X 2 days.  A little watery but no discharge.  No matting.  No visual changes or pain.    The following portions of the patient's history were reviewed and updated as appropriate: allergies, current medications, past family history, past medical history, past social history, past surgical history and problem list.    Allergies: Patient has no known allergies.    Review of Systems   Constitutional: Negative for fever.   Eyes: Positive for itching (right).   Respiratory: Negative.    Cardiovascular: Negative.    Gastrointestinal: Positive for abdominal pain. Negative for diarrhea, nausea and vomiting.   Endocrine: Negative.    Genitourinary: Negative for dysuria, frequency and hematuria.   Skin: Negative.    Allergic/Immunologic: Negative.    Neurological: Negative.    Hematological: Negative.    Psychiatric/Behavioral: Negative.              Wt Readings from Last 3 Encounters:   12/20/18 82.6 kg (182 lb)   06/14/18 80.4 kg (177 lb 3.2 oz)   04/18/18 82.1 kg (181 lb)     Temp Readings from Last 3 Encounters:   02/04/19 98.3 °F (36.8 °C)   12/20/18 99.1 °F (37.3 °C)   06/14/18 98.5 °F (36.9 °C)     BP Readings from Last 3 Encounters:   02/04/19 130/70   12/20/18 110/70   06/14/18 112/68     Pulse Readings from Last 3 Encounters:   02/04/19 66   12/20/18 76   06/14/18 70     There is no height or weight on file to calculate BMI.  @LASTSAO2(3)@    Physical Exam   Constitutional: She is oriented to person, place, and time. She  appears well-developed and well-nourished. No distress.   HENT:   Head: Normocephalic and atraumatic.   Eyes: Conjunctivae, EOM and lids are normal. Right eye exhibits no chemosis, no discharge and no exudate. Left eye exhibits no chemosis, no discharge and no exudate. Right conjunctiva is not injected. Left conjunctiva is not injected.   Neck: Normal range of motion. Neck supple.   Pulmonary/Chest: Effort normal. No respiratory distress.   Abdominal: Soft. Bowel sounds are normal. She exhibits no distension. There is no rebound and no guarding.   Musculoskeletal: Normal range of motion. She exhibits no edema.   Neurological: She is alert and oriented to person, place, and time.   Skin: Skin is warm and dry.   Psychiatric: She has a normal mood and affect. Her behavior is normal.   Nursing note and vitals reviewed.      Results for orders placed or performed in visit on 02/04/19   POCT urinalysis dipstick, automated   Result Value Ref Range    Color Yellow Yellow, Straw, Dark Yellow, Jana    Clarity, UA Cloudy (A) Clear    Specific Gravity  1.025 1.005 - 1.030    pH, Urine 5.5 5.0 - 8.0    Leukocytes Negative Negative    Nitrite, UA Negative Negative    Protein, POC Negative Negative mg/dL    Glucose, UA Negative Negative, 1000 mg/dL (3+) mg/dL    Ketones, UA Trace (A) Negative    Urobilinogen, UA Normal Normal    Bilirubin Small (1+) (A) Negative    Blood, UA Negative Negative           Lilliam was seen today for abdominal pain and eye problem.    Diagnoses and all orders for this visit:    Abdominal pain, unspecified abdominal location  -     POCT urinalysis dipstick, automated    RUQ pain  -     Ambulatory Referral to General Surgery    Calculus of gallbladder with biliary obstruction but without cholecystitis  -     Ambulatory Referral to General Surgery    Itch of right eye      1. Gallbladder u/s done 3 days ago at [facility] shows multiple gallstones and a mild amount of sludge with no evidence  of cholecystitis.  Will refer to a general surgeon.  Warning s/sxs discussed.    2. Right eye itching.  Normal exam.  Monitor closely.  Warning s/sxs discussed.    Outpatient Medications Prior to Visit   Medication Sig Dispense Refill   • Adapalene-Benzoyl Peroxide 0.1-2.5 % gel APPLY TO AFFECTED AREA(S) ONCE NIGHTLY 45 g 3   • albuterol (PROVENTIL) (2.5 MG/3ML) 0.083% nebulizer solution INHALE CONTENTS OF 1 VIAL BY MOUTH VIA NEBULIZER EVERY 4 HOURS AS NEEDED FOR WHEEZING 180 mL 1   • Biotin 300 MCG tablet Take  by mouth.     • buPROPion XL (WELLBUTRIN XL) 300 MG 24 hr tablet TAKE ONE TABLET BY MOUTH DAILY 30 tablet 4   • Cholecalciferol (VITAMIN D3) 2000 UNITS tablet Take 1 tablet by mouth Daily.     • ciclesonide (OMNARIS) 50 MCG/ACT nasal spray 2 sprays by Each Nare route Daily. 1 each 11   • dicyclomine (BENTYL) 20 MG tablet Take 20 mg by mouth Every 6 (Six) Hours.     • estrogens, conjugated, (PREMARIN) 0.625 MG tablet Take 1 tablet by mouth Daily. Take daily for 21 days then do not take for 7 days. 30 tablet 5   • FLUoxetine (PROzac) 20 MG tablet TAKE 1 TABLET BY MOUTH DAILY 90 tablet 1   • Multiple Vitamin (MULTI VITAMIN) tablet Take  by mouth Daily.     • naproxen (NAPROSYN) 500 MG tablet TAKE ONE TABLET BY MOUTH TWICE A DAY WITH MEALS 60 tablet 4   • Nutritional Supplements (DHEA PO) Take 1 tablet by mouth Daily.     • ondansetron (ZOFRAN) 4 MG tablet Take 4 mg by mouth Every 8 (Eight) Hours As Needed for Nausea or Vomiting.     • traZODone (DESYREL) 50 MG tablet TAKE 1 TABLET BY MOUTH EVERY NIGHT AT BEDTIME AS NEEDED 90 tablet 1   • HYDROcodone-acetaminophen (NORCO) 5-325 MG per tablet Take 1-2 tablets by mouth At Night As Needed for Moderate Pain  or Severe Pain . 20 tablet 0     No facility-administered medications prior to visit.      No orders of the defined types were placed in this encounter.    [unfilled]  Medications Discontinued During This Encounter   Medication Reason   •  HYDROcodone-acetaminophen (NORCO) 5-325 MG per tablet *Therapy completed         Return if symptoms worsen or fail to improve, for Next scheduled follow up.

## 2019-02-06 ENCOUNTER — OFFICE VISIT (OUTPATIENT)
Dept: SURGERY | Facility: CLINIC | Age: 59
End: 2019-02-06

## 2019-02-06 VITALS
DIASTOLIC BLOOD PRESSURE: 78 MMHG | WEIGHT: 170 LBS | HEIGHT: 62 IN | BODY MASS INDEX: 31.28 KG/M2 | SYSTOLIC BLOOD PRESSURE: 140 MMHG | RESPIRATION RATE: 18 BRPM

## 2019-02-06 DIAGNOSIS — K80.20 CALCULUS OF GALLBLADDER WITHOUT CHOLECYSTITIS WITHOUT OBSTRUCTION: Primary | ICD-10-CM

## 2019-02-06 LAB
BACTERIA UR CULT: NO GROWTH
BACTERIA UR CULT: NORMAL
ONE SPECIMEN IDENTIFIER: NORMAL

## 2019-02-06 PROCEDURE — 99203 OFFICE O/P NEW LOW 30 MIN: CPT | Performed by: SURGERY

## 2019-02-06 RX ORDER — OLOPATADINE HYDROCHLORIDE 1 MG/ML
1 SOLUTION/ DROPS OPHTHALMIC 2 TIMES DAILY
Qty: 5 ML | Refills: 12 | Status: SHIPPED | OUTPATIENT
Start: 2019-02-06 | End: 2020-04-09

## 2019-02-06 NOTE — H&P
PATIENT INFORMATION  Lilliam Portillo       - 1960    CHIEF COMPLAINT  Chief Complaint   Patient presents with   • Cholelithiasis   Abdominal pain x 1 week - U/S completed 2019    HISTORY OF PRESENT ILLNESS  HPI she complains of a 1 week history of epigastric and right upper quadrant pain.  She denies any nausea vomiting fevers chills or jaundice type symptoms.  She says this is worse with eating.  She denies any particular kind of foods causing problems.  She denies any prior problems.  She had an ultrasound that showed cholelithiasis        REVIEW OF SYSTEMS  Review of Systems   Gastrointestinal: Positive for abdominal pain and constipation.   Had recent foot surgery for her know,      ACTIVE PROBLEMS  Patient Active Problem List    Diagnosis   • RUQ pain [R10.11]   • Calculus of gallbladder without cholecystitis [K80.20]   • Spinal stenosis, lumbar region, with neurogenic claudication [M48.062]   • Lumbar radiculopathy, acute [M54.16]   • Skull defect [M95.2]   • Diverticulosis [K57.90]   • History of diabetes mellitus resolved following bariatric surgery [Z86.39, Z98.84]   • History of bariatric surgery [Z98.84]   • Diverticulitis of colon [K57.32]   • Pulmonary nodules [R91.8]   • Atopic rhinitis [J30.9]   • Degeneration of intervertebral disc of cervical region [M50.30]   • Degeneration of intervertebral disc of lumbar region [M51.36]   • Depression [F32.9]   • Insomnia [G47.00]   • Atrophic vaginitis [N95.2]   • Prediabetes [R73.03]         PAST MEDICAL HISTORY  Past Medical History:   Diagnosis Date   • Depression          SURGICAL HISTORY  Past Surgical History:   Procedure Laterality Date   • CARPAL TUNNEL RELEASE     •  SECTION     • FOOT SURGERY Right 2009    Bone spur    • GASTRIC SLEEVE LAPAROSCOPIC  2013   • HYSTERECTOMY     • SHOULDER SURGERY     • TRIGGER FINGER RELEASE           FAMILY HISTORY  Family History   Problem Relation Age of Onset   • Diabetes Mother    •  Breast cancer Maternal Aunt    • Breast cancer Cousin    • Diabetes Father    • Hypertension Father    • Glaucoma Father          SOCIAL HISTORY  Social History     Occupational History   • Occupation:     Tobacco Use   • Smoking status: Never Smoker   • Smokeless tobacco: Never Used   Substance and Sexual Activity   • Alcohol use: No   • Drug use: Not on file   • Sexual activity: Not on file       Debilities/Disabilities Identified: None    Emotional Behavior: Appropriate    CURRENT MEDICATIONS    Current Outpatient Medications:   •  Adapalene-Benzoyl Peroxide 0.1-2.5 % gel, APPLY TO AFFECTED AREA(S) ONCE NIGHTLY, Disp: 45 g, Rfl: 3  •  albuterol (PROVENTIL) (2.5 MG/3ML) 0.083% nebulizer solution, INHALE CONTENTS OF 1 VIAL BY MOUTH VIA NEBULIZER EVERY 4 HOURS AS NEEDED FOR WHEEZING, Disp: 180 mL, Rfl: 1  •  Biotin 300 MCG tablet, Take  by mouth., Disp: , Rfl:   •  buPROPion XL (WELLBUTRIN XL) 300 MG 24 hr tablet, TAKE ONE TABLET BY MOUTH DAILY, Disp: 30 tablet, Rfl: 4  •  Cholecalciferol (VITAMIN D3) 2000 UNITS tablet, Take 1 tablet by mouth Daily., Disp: , Rfl:   •  ciclesonide (OMNARIS) 50 MCG/ACT nasal spray, 2 sprays by Each Nare route Daily., Disp: 1 each, Rfl: 11  •  dicyclomine (BENTYL) 20 MG tablet, Take 20 mg by mouth Every 6 (Six) Hours., Disp: , Rfl:   •  estrogens, conjugated, (PREMARIN) 0.625 MG tablet, Take 1 tablet by mouth Daily. Take daily for 21 days then do not take for 7 days., Disp: 30 tablet, Rfl: 5  •  FLUoxetine (PROzac) 20 MG tablet, TAKE 1 TABLET BY MOUTH DAILY, Disp: 90 tablet, Rfl: 1  •  Multiple Vitamin (MULTI VITAMIN) tablet, Take  by mouth Daily., Disp: , Rfl:   •  naproxen (NAPROSYN) 500 MG tablet, TAKE ONE TABLET BY MOUTH TWICE A DAY WITH MEALS, Disp: 60 tablet, Rfl: 4  •  Nutritional Supplements (DHEA PO), Take 1 tablet by mouth Daily., Disp: , Rfl:   •  olopatadine (PATANOL) 0.1 % ophthalmic solution, Administer 1 drop to both eyes 2 (Two) Times a  "Day., Disp: 5 mL, Rfl: 12  •  ondansetron (ZOFRAN) 4 MG tablet, Take 4 mg by mouth Every 8 (Eight) Hours As Needed for Nausea or Vomiting., Disp: , Rfl:   •  traZODone (DESYREL) 50 MG tablet, TAKE 1 TABLET BY MOUTH EVERY NIGHT AT BEDTIME AS NEEDED, Disp: 90 tablet, Rfl: 1    ALLERGIES  Patient has no known allergies.    VITALS  Vitals:    02/06/19 1459   BP: 140/78   Resp: 18   Weight: 77.1 kg (170 lb)   Height: 157.5 cm (62\")       LAST RESULTS   Orders Only on 02/04/2019   Component Date Value Ref Range Status   • Urine Culture 02/04/2019 Final report   Final   • Result 1 02/04/2019 No growth   Final   • One Specimen Identifier 02/04/2019 Comment   Final    Comment: The specimen received included only one patient identifier on the  primary collection container.  Our laboratory accrediting agency  states \"All primary specimen containers must be labeled with 2  identifiers at the time of collection.\"       No results found.    PHYSICAL EXAM  Physical Exam alert white female in no active distress.  She is oriented ×3.  She is not having clinical jaundice.  Her heart shows regular rate and rhythm.  Her lungs are clear and equal.  Her abdomen shows epigastric and right upper quadrant tenderness without mass.  Ultrasound of her gallbladder showed cholelithiasis with a normal size common bile duct.    ASSESSMENT  Cholelithiasis      PLAN  The risks benefits and options were discussed with the patient in detail.  We will proceed with a laparoscopic cholecystectomy at [facility] Monday, February 11                          "

## 2019-02-06 NOTE — PROGRESS NOTES
PATIENT INFORMATION  Lilliam Portillo       - 1960    CHIEF COMPLAINT  Chief Complaint   Patient presents with   • Cholelithiasis   Abdominal pain x 1 week - U/S completed 2019    HISTORY OF PRESENT ILLNESS  HPI she complains of a 1 week history of epigastric and right upper quadrant pain.  She denies any nausea vomiting fevers chills or jaundice type symptoms.  She says this is worse with eating.  She denies any particular kind of foods causing problems.  She denies any prior problems.  She had an ultrasound that showed cholelithiasis        REVIEW OF SYSTEMS  Review of Systems   Gastrointestinal: Positive for abdominal pain and constipation.   Had recent foot surgery for her know,      ACTIVE PROBLEMS  Patient Active Problem List    Diagnosis   • RUQ pain [R10.11]   • Calculus of gallbladder without cholecystitis [K80.20]   • Spinal stenosis, lumbar region, with neurogenic claudication [M48.062]   • Lumbar radiculopathy, acute [M54.16]   • Skull defect [M95.2]   • Diverticulosis [K57.90]   • History of diabetes mellitus resolved following bariatric surgery [Z86.39, Z98.84]   • History of bariatric surgery [Z98.84]   • Diverticulitis of colon [K57.32]   • Pulmonary nodules [R91.8]   • Atopic rhinitis [J30.9]   • Degeneration of intervertebral disc of cervical region [M50.30]   • Degeneration of intervertebral disc of lumbar region [M51.36]   • Depression [F32.9]   • Insomnia [G47.00]   • Atrophic vaginitis [N95.2]   • Prediabetes [R73.03]         PAST MEDICAL HISTORY  Past Medical History:   Diagnosis Date   • Depression          SURGICAL HISTORY  Past Surgical History:   Procedure Laterality Date   • CARPAL TUNNEL RELEASE     •  SECTION     • FOOT SURGERY Right 2009    Bone spur    • GASTRIC SLEEVE LAPAROSCOPIC  2013   • HYSTERECTOMY     • SHOULDER SURGERY     • TRIGGER FINGER RELEASE           FAMILY HISTORY  Family History   Problem Relation Age of Onset   • Diabetes Mother    •  Breast cancer Maternal Aunt    • Breast cancer Cousin    • Diabetes Father    • Hypertension Father    • Glaucoma Father          SOCIAL HISTORY  Social History     Occupational History   • Occupation:     Tobacco Use   • Smoking status: Never Smoker   • Smokeless tobacco: Never Used   Substance and Sexual Activity   • Alcohol use: No   • Drug use: Not on file   • Sexual activity: Not on file       Debilities/Disabilities Identified: None    Emotional Behavior: Appropriate    CURRENT MEDICATIONS    Current Outpatient Medications:   •  Adapalene-Benzoyl Peroxide 0.1-2.5 % gel, APPLY TO AFFECTED AREA(S) ONCE NIGHTLY, Disp: 45 g, Rfl: 3  •  albuterol (PROVENTIL) (2.5 MG/3ML) 0.083% nebulizer solution, INHALE CONTENTS OF 1 VIAL BY MOUTH VIA NEBULIZER EVERY 4 HOURS AS NEEDED FOR WHEEZING, Disp: 180 mL, Rfl: 1  •  Biotin 300 MCG tablet, Take  by mouth., Disp: , Rfl:   •  buPROPion XL (WELLBUTRIN XL) 300 MG 24 hr tablet, TAKE ONE TABLET BY MOUTH DAILY, Disp: 30 tablet, Rfl: 4  •  Cholecalciferol (VITAMIN D3) 2000 UNITS tablet, Take 1 tablet by mouth Daily., Disp: , Rfl:   •  ciclesonide (OMNARIS) 50 MCG/ACT nasal spray, 2 sprays by Each Nare route Daily., Disp: 1 each, Rfl: 11  •  dicyclomine (BENTYL) 20 MG tablet, Take 20 mg by mouth Every 6 (Six) Hours., Disp: , Rfl:   •  estrogens, conjugated, (PREMARIN) 0.625 MG tablet, Take 1 tablet by mouth Daily. Take daily for 21 days then do not take for 7 days., Disp: 30 tablet, Rfl: 5  •  FLUoxetine (PROzac) 20 MG tablet, TAKE 1 TABLET BY MOUTH DAILY, Disp: 90 tablet, Rfl: 1  •  Multiple Vitamin (MULTI VITAMIN) tablet, Take  by mouth Daily., Disp: , Rfl:   •  naproxen (NAPROSYN) 500 MG tablet, TAKE ONE TABLET BY MOUTH TWICE A DAY WITH MEALS, Disp: 60 tablet, Rfl: 4  •  Nutritional Supplements (DHEA PO), Take 1 tablet by mouth Daily., Disp: , Rfl:   •  olopatadine (PATANOL) 0.1 % ophthalmic solution, Administer 1 drop to both eyes 2 (Two) Times a  "Day., Disp: 5 mL, Rfl: 12  •  ondansetron (ZOFRAN) 4 MG tablet, Take 4 mg by mouth Every 8 (Eight) Hours As Needed for Nausea or Vomiting., Disp: , Rfl:   •  traZODone (DESYREL) 50 MG tablet, TAKE 1 TABLET BY MOUTH EVERY NIGHT AT BEDTIME AS NEEDED, Disp: 90 tablet, Rfl: 1    ALLERGIES  Patient has no known allergies.    VITALS  Vitals:    02/06/19 1459   BP: 140/78   Resp: 18   Weight: 77.1 kg (170 lb)   Height: 157.5 cm (62\")       LAST RESULTS   Orders Only on 02/04/2019   Component Date Value Ref Range Status   • Urine Culture 02/04/2019 Final report   Final   • Result 1 02/04/2019 No growth   Final   • One Specimen Identifier 02/04/2019 Comment   Final    Comment: The specimen received included only one patient identifier on the  primary collection container.  Our laboratory accrediting agency  states \"All primary specimen containers must be labeled with 2  identifiers at the time of collection.\"       No results found.    PHYSICAL EXAM  Physical Exam alert white female in no active distress.  She is oriented ×3.  She is not having clinical jaundice.  Her heart shows regular rate and rhythm.  Her lungs are clear and equal.  Her abdomen shows epigastric and right upper quadrant tenderness without mass.  Ultrasound of her gallbladder showed cholelithiasis with a normal size common bile duct.    ASSESSMENT  Cholelithiasis      PLAN  The risks benefits and options were discussed with the patient in detail.  We will proceed with a laparoscopic cholecystectomy at [facility] Monday, February 11                          "

## 2019-02-06 NOTE — H&P (VIEW-ONLY)
PATIENT INFORMATION  Lilliam Portillo       - 1960    CHIEF COMPLAINT  Chief Complaint   Patient presents with   • Cholelithiasis   Abdominal pain x 1 week - U/S completed 2019    HISTORY OF PRESENT ILLNESS  HPI she complains of a 1 week history of epigastric and right upper quadrant pain.  She denies any nausea vomiting fevers chills or jaundice type symptoms.  She says this is worse with eating.  She denies any particular kind of foods causing problems.  She denies any prior problems.  She had an ultrasound that showed cholelithiasis        REVIEW OF SYSTEMS  Review of Systems   Gastrointestinal: Positive for abdominal pain and constipation.   Had recent foot surgery for her know,      ACTIVE PROBLEMS  Patient Active Problem List    Diagnosis   • RUQ pain [R10.11]   • Calculus of gallbladder without cholecystitis [K80.20]   • Spinal stenosis, lumbar region, with neurogenic claudication [M48.062]   • Lumbar radiculopathy, acute [M54.16]   • Skull defect [M95.2]   • Diverticulosis [K57.90]   • History of diabetes mellitus resolved following bariatric surgery [Z86.39, Z98.84]   • History of bariatric surgery [Z98.84]   • Diverticulitis of colon [K57.32]   • Pulmonary nodules [R91.8]   • Atopic rhinitis [J30.9]   • Degeneration of intervertebral disc of cervical region [M50.30]   • Degeneration of intervertebral disc of lumbar region [M51.36]   • Depression [F32.9]   • Insomnia [G47.00]   • Atrophic vaginitis [N95.2]   • Prediabetes [R73.03]         PAST MEDICAL HISTORY  Past Medical History:   Diagnosis Date   • Depression          SURGICAL HISTORY  Past Surgical History:   Procedure Laterality Date   • CARPAL TUNNEL RELEASE     •  SECTION     • FOOT SURGERY Right 2009    Bone spur    • GASTRIC SLEEVE LAPAROSCOPIC  2013   • HYSTERECTOMY     • SHOULDER SURGERY     • TRIGGER FINGER RELEASE           FAMILY HISTORY  Family History   Problem Relation Age of Onset   • Diabetes Mother    •  Breast cancer Maternal Aunt    • Breast cancer Cousin    • Diabetes Father    • Hypertension Father    • Glaucoma Father          SOCIAL HISTORY  Social History     Occupational History   • Occupation:     Tobacco Use   • Smoking status: Never Smoker   • Smokeless tobacco: Never Used   Substance and Sexual Activity   • Alcohol use: No   • Drug use: Not on file   • Sexual activity: Not on file       Debilities/Disabilities Identified: None    Emotional Behavior: Appropriate    CURRENT MEDICATIONS    Current Outpatient Medications:   •  Adapalene-Benzoyl Peroxide 0.1-2.5 % gel, APPLY TO AFFECTED AREA(S) ONCE NIGHTLY, Disp: 45 g, Rfl: 3  •  albuterol (PROVENTIL) (2.5 MG/3ML) 0.083% nebulizer solution, INHALE CONTENTS OF 1 VIAL BY MOUTH VIA NEBULIZER EVERY 4 HOURS AS NEEDED FOR WHEEZING, Disp: 180 mL, Rfl: 1  •  Biotin 300 MCG tablet, Take  by mouth., Disp: , Rfl:   •  buPROPion XL (WELLBUTRIN XL) 300 MG 24 hr tablet, TAKE ONE TABLET BY MOUTH DAILY, Disp: 30 tablet, Rfl: 4  •  Cholecalciferol (VITAMIN D3) 2000 UNITS tablet, Take 1 tablet by mouth Daily., Disp: , Rfl:   •  ciclesonide (OMNARIS) 50 MCG/ACT nasal spray, 2 sprays by Each Nare route Daily., Disp: 1 each, Rfl: 11  •  dicyclomine (BENTYL) 20 MG tablet, Take 20 mg by mouth Every 6 (Six) Hours., Disp: , Rfl:   •  estrogens, conjugated, (PREMARIN) 0.625 MG tablet, Take 1 tablet by mouth Daily. Take daily for 21 days then do not take for 7 days., Disp: 30 tablet, Rfl: 5  •  FLUoxetine (PROzac) 20 MG tablet, TAKE 1 TABLET BY MOUTH DAILY, Disp: 90 tablet, Rfl: 1  •  Multiple Vitamin (MULTI VITAMIN) tablet, Take  by mouth Daily., Disp: , Rfl:   •  naproxen (NAPROSYN) 500 MG tablet, TAKE ONE TABLET BY MOUTH TWICE A DAY WITH MEALS, Disp: 60 tablet, Rfl: 4  •  Nutritional Supplements (DHEA PO), Take 1 tablet by mouth Daily., Disp: , Rfl:   •  olopatadine (PATANOL) 0.1 % ophthalmic solution, Administer 1 drop to both eyes 2 (Two) Times a  "Day., Disp: 5 mL, Rfl: 12  •  ondansetron (ZOFRAN) 4 MG tablet, Take 4 mg by mouth Every 8 (Eight) Hours As Needed for Nausea or Vomiting., Disp: , Rfl:   •  traZODone (DESYREL) 50 MG tablet, TAKE 1 TABLET BY MOUTH EVERY NIGHT AT BEDTIME AS NEEDED, Disp: 90 tablet, Rfl: 1    ALLERGIES  Patient has no known allergies.    VITALS  Vitals:    02/06/19 1459   BP: 140/78   Resp: 18   Weight: 77.1 kg (170 lb)   Height: 157.5 cm (62\")       LAST RESULTS   Orders Only on 02/04/2019   Component Date Value Ref Range Status   • Urine Culture 02/04/2019 Final report   Final   • Result 1 02/04/2019 No growth   Final   • One Specimen Identifier 02/04/2019 Comment   Final    Comment: The specimen received included only one patient identifier on the  primary collection container.  Our laboratory accrediting agency  states \"All primary specimen containers must be labeled with 2  identifiers at the time of collection.\"       No results found.    PHYSICAL EXAM  Physical Exam alert white female in no active distress.  She is oriented ×3.  She is not having clinical jaundice.  Her heart shows regular rate and rhythm.  Her lungs are clear and equal.  Her abdomen shows epigastric and right upper quadrant tenderness without mass.  Ultrasound of her gallbladder showed cholelithiasis with a normal size common bile duct.    ASSESSMENT  Cholelithiasis      PLAN  The risks benefits and options were discussed with the patient in detail.  We will proceed with a laparoscopic cholecystectomy at [facility] Monday, February 11                          "

## 2019-02-07 ENCOUNTER — TELEPHONE (OUTPATIENT)
Dept: INTERNAL MEDICINE | Facility: CLINIC | Age: 59
End: 2019-02-07

## 2019-02-07 RX ORDER — TRAZODONE HYDROCHLORIDE 50 MG/1
50 TABLET ORAL NIGHTLY PRN
COMMUNITY
End: 2019-08-27 | Stop reason: SDUPTHER

## 2019-02-07 RX ORDER — BUPROPION HYDROCHLORIDE 300 MG/1
300 TABLET ORAL NIGHTLY
COMMUNITY
End: 2019-06-23 | Stop reason: SDUPTHER

## 2019-02-07 RX ORDER — ALBUTEROL SULFATE 2.5 MG/3ML
2.5 SOLUTION RESPIRATORY (INHALATION) EVERY 4 HOURS PRN
COMMUNITY

## 2019-02-07 RX ORDER — NAPROXEN 500 MG/1
500 TABLET ORAL 2 TIMES DAILY WITH MEALS
COMMUNITY
End: 2019-11-14 | Stop reason: SDUPTHER

## 2019-02-07 RX ORDER — MONTELUKAST SODIUM 10 MG/1
10 TABLET ORAL NIGHTLY
COMMUNITY
End: 2019-03-24 | Stop reason: SDUPTHER

## 2019-02-07 RX ORDER — FLUOXETINE HYDROCHLORIDE 20 MG/1
20 CAPSULE ORAL NIGHTLY
COMMUNITY
End: 2019-10-23 | Stop reason: ALTCHOICE

## 2019-02-07 NOTE — TELEPHONE ENCOUNTER
Patient has been advised and voiced understanding.     ----- Message from León Wood MD sent at 2019  1:43 PM EST -----  Regarding: FW: CALL IN SCRIP  Contact: [phone]  I sent in some Patanol eye drops for allergic conjunctivitis.  If this doesn't work or she develops white or yellow drainage or redness or pain, she needs to let us know.  ----- Message -----  From: Susan Hendrickson MA  Sent: 2019  12:26 PM  To: León Wood MD  Subject: FW: CALL IN SCRIP                                    ----- Message -----  From: Melissa Medrano  Sent: 2019  12:10 PM  To: [facility]  Subject: CALL IN SCRIP                                    :  64  [place] PT.    PATIENT SAW DR. WOOD YESTERDAY COMPLAINING OF HER EYE. SINCE DR. WOOD DID NOT SEE ANYTHING IN HER EYE, SHE TOLD THE PATIENT TO CALL BACK IF THE ITCHING CONTINUED. TODAY THE ITCHING IS WORSE. CAN SOMETHING BE CALLED IN TO [facility] IN [place] FOR HER?

## 2019-02-12 ENCOUNTER — ANESTHESIA EVENT (OUTPATIENT)
Dept: PERIOP | Facility: HOSPITAL | Age: 59
End: 2019-02-12

## 2019-02-12 ENCOUNTER — HOSPITAL ENCOUNTER (OUTPATIENT)
Dept: CARDIOLOGY | Facility: HOSPITAL | Age: 59
Discharge: HOME | End: 2019-02-12
Admitting: SURGERY

## 2019-02-12 ENCOUNTER — LAB (OUTPATIENT)
Dept: LAB | Facility: HOSPITAL | Age: 59
End: 2019-02-12

## 2019-02-12 DIAGNOSIS — K80.20 CALCULUS OF GALLBLADDER WITHOUT CHOLECYSTITIS WITHOUT OBSTRUCTION: ICD-10-CM

## 2019-02-12 LAB
ALBUMIN SERPL-MCNC: 4.1 G/DL (ref 3.5–5.2)
ALBUMIN/GLOB SERPL: 1.2 G/DL
ALP SERPL-CCNC: 113 U/L (ref 40–129)
ALT SERPL W P-5'-P-CCNC: 45 U/L (ref 5–33)
ANION GAP SERPL CALCULATED.3IONS-SCNC: 10.8 MMOL/L
AST SERPL-CCNC: 16 U/L (ref 5–32)
BILIRUB SERPL-MCNC: 0.5 MG/DL (ref 0.2–1.2)
BUN BLD-MCNC: 9 MG/DL (ref 6–20)
BUN/CREAT SERPL: 10.8 (ref 7–25)
CALCIUM SPEC-SCNC: 9.5 MG/DL (ref 8.6–10.5)
CHLORIDE SERPL-SCNC: 100 MMOL/L (ref 98–107)
CO2 SERPL-SCNC: 27.2 MMOL/L (ref 22–29)
CREAT BLD-MCNC: 0.83 MG/DL (ref 0.57–1)
DEPRECATED RDW RBC AUTO: 43.6 FL (ref 37–54)
ERYTHROCYTE [DISTWIDTH] IN BLOOD BY AUTOMATED COUNT: 12.6 % (ref 12.3–15.4)
GFR SERPL CREATININE-BSD FRML MDRD: 71 ML/MIN/1.73
GLOBULIN UR ELPH-MCNC: 3.3 GM/DL
GLUCOSE BLD-MCNC: 112 MG/DL (ref 65–99)
HCT VFR BLD AUTO: 44 % (ref 34–46.6)
HGB BLD-MCNC: 14.5 G/DL (ref 12–15.9)
MCH RBC QN AUTO: 31.2 PG (ref 26.6–33)
MCHC RBC AUTO-ENTMCNC: 33 G/DL (ref 31.5–35.7)
MCV RBC AUTO: 94.6 FL (ref 79–97)
PLATELET # BLD AUTO: 328 10*3/MM3 (ref 140–450)
PMV BLD AUTO: 9.1 FL (ref 6–12)
POTASSIUM BLD-SCNC: 4.1 MMOL/L (ref 3.5–5.2)
PROT SERPL-MCNC: 7.4 G/DL (ref 6–8.5)
RBC # BLD AUTO: 4.65 10*6/MM3 (ref 3.77–5.28)
SODIUM BLD-SCNC: 138 MMOL/L (ref 136–145)
WBC NRBC COR # BLD: 11.4 10*3/MM3 (ref 3.4–10.8)

## 2019-02-12 PROCEDURE — 36415 COLL VENOUS BLD VENIPUNCTURE: CPT

## 2019-02-12 PROCEDURE — 80053 COMPREHEN METABOLIC PANEL: CPT

## 2019-02-12 PROCEDURE — 93005 ELECTROCARDIOGRAM TRACING: CPT | Performed by: SURGERY

## 2019-02-12 PROCEDURE — 85027 COMPLETE CBC AUTOMATED: CPT

## 2019-02-12 PROCEDURE — 93010 ELECTROCARDIOGRAM REPORT: CPT | Performed by: INTERNAL MEDICINE

## 2019-02-13 ENCOUNTER — ANESTHESIA (OUTPATIENT)
Dept: PERIOP | Facility: HOSPITAL | Age: 59
End: 2019-02-13

## 2019-02-13 ENCOUNTER — HOSPITAL ENCOUNTER (OUTPATIENT)
Facility: HOSPITAL | Age: 59
Discharge: HOME | End: 2019-02-14
Attending: SURGERY | Admitting: SURGERY

## 2019-02-13 DIAGNOSIS — K80.20 CALCULUS OF GALLBLADDER WITHOUT CHOLECYSTITIS WITHOUT OBSTRUCTION: ICD-10-CM

## 2019-02-13 PROCEDURE — 25010000002 MIDAZOLAM PER 1 MG: Performed by: ANESTHESIOLOGY

## 2019-02-13 PROCEDURE — 94799 UNLISTED PULMONARY SVC/PX: CPT

## 2019-02-13 PROCEDURE — 25010000002 DEXAMETHASONE PER 1 MG: Performed by: ANESTHESIOLOGY

## 2019-02-13 PROCEDURE — 25010000002 SUCCINYLCHOLINE PER 20 MG: Performed by: NURSE ANESTHETIST, CERTIFIED REGISTERED

## 2019-02-13 PROCEDURE — 99024 POSTOP FOLLOW-UP VISIT: CPT | Performed by: SURGERY

## 2019-02-13 PROCEDURE — G0378 HOSPITAL OBSERVATION PER HR: HCPCS

## 2019-02-13 PROCEDURE — 25010000002 PROPOFOL 10 MG/ML EMULSION: Performed by: NURSE ANESTHETIST, CERTIFIED REGISTERED

## 2019-02-13 PROCEDURE — 25010000002 HYDROMORPHONE 1 MG/ML SOLUTION: Performed by: NURSE ANESTHETIST, CERTIFIED REGISTERED

## 2019-02-13 PROCEDURE — 25010000003 CEFAZOLIN SODIUM-DEXTROSE 2-3 GM-%(50ML) RECONSTITUTED SOLUTION: Performed by: SURGERY

## 2019-02-13 PROCEDURE — 88304 TISSUE EXAM BY PATHOLOGIST: CPT | Performed by: SURGERY

## 2019-02-13 PROCEDURE — 25010000002 NEOSTIGMINE PER 0.5 MG: Performed by: NURSE ANESTHETIST, CERTIFIED REGISTERED

## 2019-02-13 PROCEDURE — 25010000002 FENTANYL CITRATE (PF) 100 MCG/2ML SOLUTION: Performed by: NURSE ANESTHETIST, CERTIFIED REGISTERED

## 2019-02-13 PROCEDURE — 47562 LAPAROSCOPIC CHOLECYSTECTOMY: CPT | Performed by: SPECIALIST/TECHNOLOGIST, OTHER

## 2019-02-13 PROCEDURE — 47562 LAPAROSCOPIC CHOLECYSTECTOMY: CPT | Performed by: SURGERY

## 2019-02-13 PROCEDURE — 25010000002 HYDROMORPHONE 1 MG/ML SOLUTION: Performed by: ANESTHESIOLOGY

## 2019-02-13 RX ORDER — ALBUTEROL SULFATE 2.5 MG/3ML
2.5 SOLUTION RESPIRATORY (INHALATION) EVERY 4 HOURS PRN
Status: DISCONTINUED | OUTPATIENT
Start: 2019-02-13 | End: 2019-02-14 | Stop reason: HOSPADM

## 2019-02-13 RX ORDER — SODIUM CHLORIDE 9 MG/ML
INJECTION, SOLUTION INTRAVENOUS AS NEEDED
Status: DISCONTINUED | OUTPATIENT
Start: 2019-02-13 | End: 2019-02-13 | Stop reason: HOSPADM

## 2019-02-13 RX ORDER — KETOTIFEN FUMARATE 0.35 MG/ML
1 SOLUTION/ DROPS OPHTHALMIC 2 TIMES DAILY
Status: DISCONTINUED | OUTPATIENT
Start: 2019-02-13 | End: 2019-02-14 | Stop reason: HOSPADM

## 2019-02-13 RX ORDER — SODIUM CHLORIDE 0.9 % (FLUSH) 0.9 %
1-10 SYRINGE (ML) INJECTION AS NEEDED
Status: DISCONTINUED | OUTPATIENT
Start: 2019-02-13 | End: 2019-02-13 | Stop reason: HOSPADM

## 2019-02-13 RX ORDER — SODIUM CHLORIDE, SODIUM LACTATE, POTASSIUM CHLORIDE, CALCIUM CHLORIDE 600; 310; 30; 20 MG/100ML; MG/100ML; MG/100ML; MG/100ML
100 INJECTION, SOLUTION INTRAVENOUS CONTINUOUS
Status: DISCONTINUED | OUTPATIENT
Start: 2019-02-13 | End: 2019-02-14 | Stop reason: HOSPADM

## 2019-02-13 RX ORDER — GLYCOPYRROLATE 0.2 MG/ML
INJECTION INTRAMUSCULAR; INTRAVENOUS AS NEEDED
Status: DISCONTINUED | OUTPATIENT
Start: 2019-02-13 | End: 2019-02-13 | Stop reason: SURG

## 2019-02-13 RX ORDER — MORPHINE SULFATE 2 MG/ML
2 INJECTION, SOLUTION INTRAMUSCULAR; INTRAVENOUS
Status: DISCONTINUED | OUTPATIENT
Start: 2019-02-13 | End: 2019-02-14 | Stop reason: HOSPADM

## 2019-02-13 RX ORDER — HYDROCODONE BITARTRATE AND ACETAMINOPHEN 5; 325 MG/1; MG/1
2 TABLET ORAL EVERY 4 HOURS PRN
Status: DISCONTINUED | OUTPATIENT
Start: 2019-02-13 | End: 2019-02-14 | Stop reason: HOSPADM

## 2019-02-13 RX ORDER — PROMETHAZINE HYDROCHLORIDE 25 MG/ML
6.25 INJECTION, SOLUTION INTRAMUSCULAR; INTRAVENOUS ONCE AS NEEDED
Status: DISCONTINUED | OUTPATIENT
Start: 2019-02-13 | End: 2019-02-13 | Stop reason: HOSPADM

## 2019-02-13 RX ORDER — DEXAMETHASONE SODIUM PHOSPHATE 4 MG/ML
8 INJECTION, SOLUTION INTRA-ARTICULAR; INTRALESIONAL; INTRAMUSCULAR; INTRAVENOUS; SOFT TISSUE ONCE
Status: COMPLETED | OUTPATIENT
Start: 2019-02-13 | End: 2019-02-13

## 2019-02-13 RX ORDER — MIDAZOLAM HYDROCHLORIDE 1 MG/ML
2 INJECTION INTRAMUSCULAR; INTRAVENOUS
Status: DISCONTINUED | OUTPATIENT
Start: 2019-02-13 | End: 2019-02-13 | Stop reason: HOSPADM

## 2019-02-13 RX ORDER — OXYCODONE HYDROCHLORIDE AND ACETAMINOPHEN 5; 325 MG/1; MG/1
1 TABLET ORAL ONCE AS NEEDED
Status: DISCONTINUED | OUTPATIENT
Start: 2019-02-13 | End: 2019-02-13 | Stop reason: HOSPADM

## 2019-02-13 RX ORDER — ONDANSETRON 2 MG/ML
4 INJECTION INTRAMUSCULAR; INTRAVENOUS ONCE AS NEEDED
Status: DISCONTINUED | OUTPATIENT
Start: 2019-02-13 | End: 2019-02-13 | Stop reason: HOSPADM

## 2019-02-13 RX ORDER — FLUOXETINE HYDROCHLORIDE 20 MG/1
20 CAPSULE ORAL NIGHTLY
Status: DISCONTINUED | OUTPATIENT
Start: 2019-02-13 | End: 2019-02-14 | Stop reason: HOSPADM

## 2019-02-13 RX ORDER — HYDROCODONE BITARTRATE AND ACETAMINOPHEN 5; 325 MG/1; MG/1
1 TABLET ORAL EVERY 4 HOURS PRN
Status: DISCONTINUED | OUTPATIENT
Start: 2019-02-13 | End: 2019-02-14 | Stop reason: HOSPADM

## 2019-02-13 RX ORDER — BUPIVACAINE HYDROCHLORIDE AND EPINEPHRINE 2.5; 5 MG/ML; UG/ML
INJECTION, SOLUTION INFILTRATION; PERINEURAL AS NEEDED
Status: DISCONTINUED | OUTPATIENT
Start: 2019-02-13 | End: 2019-02-13 | Stop reason: HOSPADM

## 2019-02-13 RX ORDER — LIDOCAINE HYDROCHLORIDE 20 MG/ML
INJECTION, SOLUTION INFILTRATION; PERINEURAL AS NEEDED
Status: DISCONTINUED | OUTPATIENT
Start: 2019-02-13 | End: 2019-02-13 | Stop reason: SURG

## 2019-02-13 RX ORDER — CEFAZOLIN SODIUM 2 G/50ML
2 SOLUTION INTRAVENOUS EVERY 8 HOURS
Status: DISCONTINUED | OUTPATIENT
Start: 2019-02-13 | End: 2019-02-14 | Stop reason: HOSPADM

## 2019-02-13 RX ORDER — MAGNESIUM HYDROXIDE 1200 MG/15ML
LIQUID ORAL AS NEEDED
Status: DISCONTINUED | OUTPATIENT
Start: 2019-02-13 | End: 2019-02-13 | Stop reason: HOSPADM

## 2019-02-13 RX ORDER — ROCURONIUM BROMIDE 10 MG/ML
INJECTION, SOLUTION INTRAVENOUS AS NEEDED
Status: DISCONTINUED | OUTPATIENT
Start: 2019-02-13 | End: 2019-02-13 | Stop reason: SURG

## 2019-02-13 RX ORDER — SODIUM CHLORIDE, SODIUM LACTATE, POTASSIUM CHLORIDE, CALCIUM CHLORIDE 600; 310; 30; 20 MG/100ML; MG/100ML; MG/100ML; MG/100ML
9 INJECTION, SOLUTION INTRAVENOUS CONTINUOUS PRN
Status: DISCONTINUED | OUTPATIENT
Start: 2019-02-13 | End: 2019-02-13 | Stop reason: HOSPADM

## 2019-02-13 RX ORDER — MORPHINE SULFATE 10 MG/ML
4 INJECTION INTRAMUSCULAR; INTRAVENOUS; SUBCUTANEOUS
Status: DISCONTINUED | OUTPATIENT
Start: 2019-02-13 | End: 2019-02-14 | Stop reason: HOSPADM

## 2019-02-13 RX ORDER — PROPOFOL 10 MG/ML
VIAL (ML) INTRAVENOUS AS NEEDED
Status: DISCONTINUED | OUTPATIENT
Start: 2019-02-13 | End: 2019-02-13 | Stop reason: SURG

## 2019-02-13 RX ORDER — MEPERIDINE HYDROCHLORIDE 25 MG/ML
12.5 INJECTION INTRAMUSCULAR; INTRAVENOUS; SUBCUTANEOUS
Status: DISCONTINUED | OUTPATIENT
Start: 2019-02-13 | End: 2019-02-13 | Stop reason: HOSPADM

## 2019-02-13 RX ORDER — SUCCINYLCHOLINE CHLORIDE 20 MG/ML
INJECTION INTRAMUSCULAR; INTRAVENOUS AS NEEDED
Status: DISCONTINUED | OUTPATIENT
Start: 2019-02-13 | End: 2019-02-13 | Stop reason: SURG

## 2019-02-13 RX ORDER — MIDAZOLAM HYDROCHLORIDE 1 MG/ML
1 INJECTION INTRAMUSCULAR; INTRAVENOUS
Status: DISCONTINUED | OUTPATIENT
Start: 2019-02-13 | End: 2019-02-13 | Stop reason: HOSPADM

## 2019-02-13 RX ORDER — FENTANYL CITRATE 50 UG/ML
INJECTION, SOLUTION INTRAMUSCULAR; INTRAVENOUS AS NEEDED
Status: DISCONTINUED | OUTPATIENT
Start: 2019-02-13 | End: 2019-02-13 | Stop reason: SURG

## 2019-02-13 RX ORDER — PROMETHAZINE HYDROCHLORIDE 25 MG/1
25 TABLET ORAL ONCE AS NEEDED
Status: DISCONTINUED | OUTPATIENT
Start: 2019-02-13 | End: 2019-02-13 | Stop reason: HOSPADM

## 2019-02-13 RX ORDER — PROMETHAZINE HYDROCHLORIDE 25 MG/1
25 SUPPOSITORY RECTAL ONCE AS NEEDED
Status: DISCONTINUED | OUTPATIENT
Start: 2019-02-13 | End: 2019-02-13 | Stop reason: HOSPADM

## 2019-02-13 RX ORDER — CEFAZOLIN SODIUM 2 G/50ML
2 SOLUTION INTRAVENOUS ONCE
Status: DISCONTINUED | OUTPATIENT
Start: 2019-02-13 | End: 2019-02-13 | Stop reason: HOSPADM

## 2019-02-13 RX ORDER — SODIUM CHLORIDE 9 MG/ML
40 INJECTION, SOLUTION INTRAVENOUS AS NEEDED
Status: DISCONTINUED | OUTPATIENT
Start: 2019-02-13 | End: 2019-02-13 | Stop reason: HOSPADM

## 2019-02-13 RX ORDER — SODIUM CHLORIDE 0.9 % (FLUSH) 0.9 %
3 SYRINGE (ML) INJECTION EVERY 12 HOURS SCHEDULED
Status: DISCONTINUED | OUTPATIENT
Start: 2019-02-13 | End: 2019-02-13 | Stop reason: HOSPADM

## 2019-02-13 RX ORDER — MONTELUKAST SODIUM 10 MG/1
10 TABLET ORAL NIGHTLY
Status: DISCONTINUED | OUTPATIENT
Start: 2019-02-13 | End: 2019-02-14 | Stop reason: HOSPADM

## 2019-02-13 RX ORDER — BUPROPION HYDROCHLORIDE 150 MG/1
300 TABLET ORAL NIGHTLY
Status: DISCONTINUED | OUTPATIENT
Start: 2019-02-13 | End: 2019-02-14 | Stop reason: HOSPADM

## 2019-02-13 RX ORDER — LIDOCAINE HYDROCHLORIDE 10 MG/ML
0.5 INJECTION, SOLUTION EPIDURAL; INFILTRATION; INTRACAUDAL; PERINEURAL ONCE AS NEEDED
Status: COMPLETED | OUTPATIENT
Start: 2019-02-13 | End: 2019-02-13

## 2019-02-13 RX ORDER — HYDROCODONE BITARTRATE AND ACETAMINOPHEN 5; 325 MG/1; MG/1
1 TABLET ORAL EVERY 6 HOURS PRN
COMMUNITY
End: 2019-04-16

## 2019-02-13 RX ADMIN — SODIUM CHLORIDE, POTASSIUM CHLORIDE, SODIUM LACTATE AND CALCIUM CHLORIDE 9 ML/HR: 600; 310; 30; 20 INJECTION, SOLUTION INTRAVENOUS at 09:49

## 2019-02-13 RX ADMIN — HYDROMORPHONE HYDROCHLORIDE 0.5 MG: 1 INJECTION, SOLUTION INTRAMUSCULAR; INTRAVENOUS; SUBCUTANEOUS at 12:59

## 2019-02-13 RX ADMIN — CEFAZOLIN SODIUM 2 G: 2 SOLUTION INTRAVENOUS at 17:31

## 2019-02-13 RX ADMIN — ROCURONIUM BROMIDE 5 MG: 10 INJECTION INTRAVENOUS at 10:30

## 2019-02-13 RX ADMIN — SUCCINYLCHOLINE CHLORIDE 100 MG: 20 INJECTION, SOLUTION INTRAMUSCULAR; INTRAVENOUS at 10:30

## 2019-02-13 RX ADMIN — FAMOTIDINE 20 MG: 10 INJECTION, SOLUTION INTRAVENOUS at 09:49

## 2019-02-13 RX ADMIN — GLYCOPYRROLATE 0.4 MG: 0.2 INJECTION INTRAMUSCULAR; INTRAVENOUS at 12:00

## 2019-02-13 RX ADMIN — LIDOCAINE HYDROCHLORIDE 0.5 ML: 10 INJECTION, SOLUTION EPIDURAL; INFILTRATION; INTRACAUDAL; PERINEURAL at 09:36

## 2019-02-13 RX ADMIN — HYDROMORPHONE HYDROCHLORIDE 1 MG: 1 INJECTION, SOLUTION INTRAMUSCULAR; INTRAVENOUS; SUBCUTANEOUS at 13:44

## 2019-02-13 RX ADMIN — BUPROPION HYDROCHLORIDE 300 MG: 150 TABLET, EXTENDED RELEASE ORAL at 21:15

## 2019-02-13 RX ADMIN — DEXAMETHASONE SODIUM PHOSPHATE 8 MG: 4 INJECTION, SOLUTION INTRAMUSCULAR; INTRAVENOUS at 09:49

## 2019-02-13 RX ADMIN — PROPOFOL 150 MG: 10 INJECTION, EMULSION INTRAVENOUS at 10:30

## 2019-02-13 RX ADMIN — HYDROCODONE BITARTRATE AND ACETAMINOPHEN 2 TABLET: 5; 325 TABLET ORAL at 15:24

## 2019-02-13 RX ADMIN — FENTANYL CITRATE 50 MCG: 50 INJECTION, SOLUTION INTRAMUSCULAR; INTRAVENOUS at 11:27

## 2019-02-13 RX ADMIN — FENTANYL CITRATE 50 MCG: 50 INJECTION, SOLUTION INTRAMUSCULAR; INTRAVENOUS at 10:30

## 2019-02-13 RX ADMIN — FLUOXETINE 20 MG: 20 CAPSULE ORAL at 21:15

## 2019-02-13 RX ADMIN — HYDROCODONE BITARTRATE AND ACETAMINOPHEN 2 TABLET: 5; 325 TABLET ORAL at 22:40

## 2019-02-13 RX ADMIN — MIDAZOLAM HYDROCHLORIDE 1 MG: 1 INJECTION, SOLUTION INTRAMUSCULAR; INTRAVENOUS at 09:50

## 2019-02-13 RX ADMIN — SODIUM CHLORIDE, POTASSIUM CHLORIDE, SODIUM LACTATE AND CALCIUM CHLORIDE: 600; 310; 30; 20 INJECTION, SOLUTION INTRAVENOUS at 09:50

## 2019-02-13 RX ADMIN — LIDOCAINE HYDROCHLORIDE 100 MG: 20 INJECTION, SOLUTION INFILTRATION; PERINEURAL at 10:30

## 2019-02-13 RX ADMIN — HYDROCODONE BITARTRATE AND ACETAMINOPHEN 1 TABLET: 5; 325 TABLET ORAL at 18:28

## 2019-02-13 RX ADMIN — HYDROMORPHONE HYDROCHLORIDE 0.5 MG: 1 INJECTION, SOLUTION INTRAMUSCULAR; INTRAVENOUS; SUBCUTANEOUS at 13:19

## 2019-02-13 RX ADMIN — SODIUM CHLORIDE, POTASSIUM CHLORIDE, SODIUM LACTATE AND CALCIUM CHLORIDE 100 ML/HR: 600; 310; 30; 20 INJECTION, SOLUTION INTRAVENOUS at 14:52

## 2019-02-13 RX ADMIN — SODIUM CHLORIDE, POTASSIUM CHLORIDE, SODIUM LACTATE AND CALCIUM CHLORIDE 9 ML/HR: 600; 310; 30; 20 INJECTION, SOLUTION INTRAVENOUS at 09:37

## 2019-02-13 RX ADMIN — ROCURONIUM BROMIDE 5 MG: 10 INJECTION INTRAVENOUS at 11:17

## 2019-02-13 RX ADMIN — ROCURONIUM BROMIDE 25 MG: 10 INJECTION INTRAVENOUS at 10:32

## 2019-02-13 RX ADMIN — MONTELUKAST SODIUM 10 MG: 10 TABLET, FILM COATED ORAL at 21:15

## 2019-02-13 RX ADMIN — FENTANYL CITRATE 50 MCG: 50 INJECTION, SOLUTION INTRAMUSCULAR; INTRAVENOUS at 10:55

## 2019-02-13 RX ADMIN — HYDROMORPHONE HYDROCHLORIDE 0.5 MG: 1 INJECTION, SOLUTION INTRAMUSCULAR; INTRAVENOUS; SUBCUTANEOUS at 13:09

## 2019-02-13 RX ADMIN — NEOSTIGMINE METHYLSULFATE 4 MG: 1 INJECTION, SOLUTION INTRAMUSCULAR; INTRAVENOUS; SUBCUTANEOUS at 12:00

## 2019-02-13 NOTE — OP NOTE
Preoperative diagnosis cholelithiasis  Postoperative diagnosis cholelithiasis acute cholecystitis  Procedure laparoscopic cholecystectomy  Complications none  Drains 10 Uzbek Rell  Anesthesia General via endotracheal tube  Surgeon Dr. Daniel Flores  Specimen gallbladder to pathology  Estimated blood loss 25 cc  Findings markedly thickened inflamed gallbladder being walled off by omentum, pus within the gallbladder  Procedure after satisfactory induction of general anesthesia via endotracheal tube the patient's abdomen was prepped and draped in sterile fashion.  Transverse skin incision was made just below the navel.  Carried out through the skin and subcutaneous tissue.  She had a moderate amount of scarring from prior surgery in this area.  Fascia was controlled medially and laterally between 0 Ethibond stay sutures.  Fashion and peritoneum were divided.  Yolanda trocar was placed within the abdomen.  There was omentum was stuck up to the anterior abdominal wall adjacent to this finger was placed within the abdomen and the omentum was swept away to allow visualization of the right upper quadrant.  5 mm ports x3 were placed one in the epigastrium through the right upper quadrant under direct vision after each site had been locally infiltrated quarter percent Marcaine with epinephrine.  Her gallbladder was distended inflamed and being walled off by the omentum.  Gallbladder was unable to be grasped.  It was punctured with the needle suction device and was drained.  This allowed the gallbladder to be grasped.  Omental adhesions were taken down between hemoclips and were swept away from the gallbladder.  The neck of the gallbladder was dissected out at its junction with the cystic duct.  Cystic duct was dilated but was dissected out.  Cystic artery was dissected out.  Cystic duct was clipped x3 and divided leaving 2 clips on the cystic duct stump.  Cystic artery was clipped x3 and divided leaving 2 clips  on the cystic artery stump.  Several small veins were clipped as well.  Gallbladder was sequentially taken of the liver bed with electrocautery.  This was difficult secondary to edema and the markedly thickened gallbladder wall.  The gallbladder was also partially intrahepatic.  The posterior wall of the gallbladder was entered but was readily controlled.  One stone was dropped but was retrieved.  Gallbladder sequentially taken the liver bed with slight cautery was dropped in an Endo Catch bag and removed from the abdomen.  Right upper quadrant was liberally irrigated hemostasis was assured in the liver bed with cautery.  All clips were intact.  The right upper quadrant was liberally irrigated hemostasis was assured and all visible fluid was removed from the abdomen.  It was elected to place a drain.  A 10 Turkmen round Rell drain was brought out the lateral 5 mm port site was placed in the right gutter and in the liver bed adjacent to the cystic duct stump.  The drain was sutured to the skin with use of 2-0 silk.  5 mm ports were sequentially pulled back all were hemostatic.  Abdomen was desufflated Yolanda trocar was removed.  Infra umbilical fascia was closed in an interrupted simple fashion with use of 0 Ethibond and placing all sutures and tying at completion.  Skin was closed with 4-0 Monocryl running subcuticular stitch burying the knots.  Drain was hooked to self bulb suction and had a good seal.  She was transferred to the recovery room in stable condition she will be admitted to an observation bed for drain care and further intravenous antibiotics.

## 2019-02-13 NOTE — PLAN OF CARE
Problem: Patient Care Overview  Goal: Plan of Care Review  Outcome: Ongoing (interventions implemented as appropriate)   02/13/19 1634   Coping/Psychosocial   Plan of Care Reviewed With patient;spouse   Plan of Care Review   Progress no change   OTHER   Outcome Summary Up ambulating with SBA x1. IVF infusing. Clear liquid diet at this time. States she is passing flatus. BS hypo x4 quadrants. Lap sites C/D/I. STANISLAV drain to R. abd, bloody drainage.        Problem: Cholecystectomy (Adult)  Goal: Signs and Symptoms of Listed Potential Problems Will be Absent, Minimized or Managed (Cholecystectomy)  Outcome: Ongoing (interventions implemented as appropriate)   02/13/19 1634   Goal/Outcome Evaluation   Problems Assessed (Cholecystectomy) all   Problems Present (Cholecystectomy) pain;decreased bowel motility;respiratory compromise     Goal: Anesthesia/Sedation Recovery  Outcome: Ongoing (interventions implemented as appropriate)   02/13/19 1634   Goal/Outcome Evaluation   Anesthesia/Sedation Recovery progressing toward baseline

## 2019-02-13 NOTE — ANESTHESIA PREPROCEDURE EVALUATION
Anesthesia Evaluation     Patient summary reviewed and Nursing notes reviewed   History of anesthetic complications: brother with hard time waking up, states she personally woke up during surgery 2000.  NPO Solid Status: > 8 hours             Airway   Mallampati: I  TM distance: >3 FB  Neck ROM: full  Possible difficult intubation and Small opening  Dental - normal exam     Pulmonary - normal exam    breath sounds clear to auscultation  Sleep apnea: snores when lying on back.  Cardiovascular - negative cardio ROS and normal exam  Exercise tolerance: good (4-7 METS)    Rhythm: regular  Rate: normal        Neuro/Psych  (+) headaches,     Numbness: denies.  GI/Hepatic/Renal/Endo    (+) obesity,     Diabetes: history of diabetes prior to gastric sleeve.    Musculoskeletal     (+) back pain, radiculopathy Right lower extremity  Abdominal    Substance History - negative use     OB/GYN negative ob/gyn ROS         Other   (+) arthritis     ROS/Med Hx Other: Pain med with sip H2O at 6 am(hydrocode)      Phys Exam Other: History of gastric sleeve              Anesthesia Plan    ASA 2     general   (Complaining of severe abdominal pain, will treat.)  intravenous induction   Anesthetic plan, all risks, benefits, and alternatives have been provided, discussed and informed consent has been obtained with: patient.

## 2019-02-13 NOTE — ANESTHESIA POSTPROCEDURE EVALUATION
Patient: Lilliam Portillo    Procedure Summary     Date:  02/13/19 Room / Location:  [facility] OR 1 /  [facility] OR    Anesthesia Start:  1025 Anesthesia Stop:  1220    Procedure:  CHOLECYSTECTOMY LAPAROSCOPIC WITH DRAIN PLACEMENT (N/A Abdomen) Diagnosis:       Calculus of gallbladder without cholecystitis without obstruction      (Calculus of gallbladder without cholecystitis without obstruction [K80.20])    Surgeon:  Tyshawn Sanchez MD Provider:  Cristina Mccracken CRNA    Anesthesia Type:  general ASA Status:  2          Anesthesia Type: general  Last vitals  BP   157/79 (02/13/19 1358)   Temp   97.3 °F (36.3 °C) (02/13/19 1223)   Pulse   93 (02/13/19 1358)   Resp   15 (02/13/19 1358)     SpO2   95 % (02/13/19 1358)     Post Anesthesia Care and Evaluation    Patient location during evaluation: PACU  Patient participation: complete - patient participated  Level of consciousness: awake and alert  Pain score: 2  Pain management: adequate  Airway patency: patent  Anesthetic complications: No anesthetic complications  PONV Status: none  Cardiovascular status: acceptable  Respiratory status: acceptable  Hydration status: acceptable

## 2019-02-13 NOTE — PROGRESS NOTES
I saw the patient postoperatively.  She is afebrile vital signs are stable.  She has complained of some incisional pain.  Her Quirino-Le drain is putting out serosanguineous returns.  She is tolerating clear liquids.  Her surgery was discussed

## 2019-02-13 NOTE — INTERVAL H&P NOTE
"  H&P updated. The patient was examined and the following changes are noted:  vs  /87 (BP Location: Left arm, Patient Position: Lying)   Pulse 67   Temp 98.7 °F (37.1 °C) (Oral)   Resp 16   Ht 157.5 cm (62.01\")   Wt 83.6 kg (184 lb 6.4 oz)   SpO2 96%   BMI 33.72 kg/m²       "

## 2019-02-13 NOTE — ANESTHESIA PROCEDURE NOTES
Airway  Urgency: elective    Airway not difficult    General Information and Staff    Patient location during procedure: OR  CRNA: Cristina Mccracken CRNA    Indications and Patient Condition  Indications for airway management: airway protection    Preoxygenated: yes  MILS maintained throughout  Mask difficulty assessment: 1 - vent by mask    Final Airway Details  Final airway type: endotracheal airway      Successful airway: ETT  Cuffed: yes   Successful intubation technique: direct laryngoscopy  Facilitating devices/methods: intubating stylet and cricoid pressure  Endotracheal tube insertion site: oral  Blade: Kane  Blade size: 3  ETT size (mm): 7.0  Cormack-Lehane Classification: grade IIa - partial view of glottis  Placement verified by: chest auscultation and capnometry   Cuff volume (mL): 8  Measured from: lips  ETT to lips (cm): 21  Number of attempts at approach: 1

## 2019-02-14 VITALS
HEIGHT: 62 IN | RESPIRATION RATE: 16 BRPM | TEMPERATURE: 97.5 F | WEIGHT: 184.4 LBS | BODY MASS INDEX: 33.93 KG/M2 | SYSTOLIC BLOOD PRESSURE: 110 MMHG | DIASTOLIC BLOOD PRESSURE: 67 MMHG | OXYGEN SATURATION: 94 % | HEART RATE: 76 BPM

## 2019-02-14 LAB
ANION GAP SERPL CALCULATED.3IONS-SCNC: 8.8 MMOL/L
BASOPHILS # BLD AUTO: 0.01 10*3/MM3 (ref 0–0.2)
BASOPHILS NFR BLD AUTO: 0.1 % (ref 0–1.5)
BUN BLD-MCNC: 6 MG/DL (ref 6–20)
BUN/CREAT SERPL: 9 (ref 7–25)
CALCIUM SPEC-SCNC: 9 MG/DL (ref 8.6–10.5)
CHLORIDE SERPL-SCNC: 101 MMOL/L (ref 98–107)
CO2 SERPL-SCNC: 30.2 MMOL/L (ref 22–29)
CREAT BLD-MCNC: 0.67 MG/DL (ref 0.57–1)
CYTO UR: NORMAL
DEPRECATED RDW RBC AUTO: 44.3 FL (ref 37–54)
EOSINOPHIL # BLD AUTO: 0 10*3/MM3 (ref 0–0.4)
EOSINOPHIL NFR BLD AUTO: 0 % (ref 0.3–6.2)
ERYTHROCYTE [DISTWIDTH] IN BLOOD BY AUTOMATED COUNT: 12.6 % (ref 12.3–15.4)
GFR SERPL CREATININE-BSD FRML MDRD: 91 ML/MIN/1.73
GLUCOSE BLD-MCNC: 132 MG/DL (ref 65–99)
HCT VFR BLD AUTO: 37.9 % (ref 34–46.6)
HGB BLD-MCNC: 12.3 G/DL (ref 12–15.9)
IMM GRANULOCYTES # BLD AUTO: 0.03 10*3/MM3 (ref 0–0.05)
IMM GRANULOCYTES NFR BLD AUTO: 0.2 % (ref 0–0.5)
LAB AP CASE REPORT: NORMAL
LYMPHOCYTES # BLD AUTO: 1.25 10*3/MM3 (ref 0.7–3.1)
LYMPHOCYTES NFR BLD AUTO: 10.1 % (ref 19.6–45.3)
MCH RBC QN AUTO: 31.1 PG (ref 26.6–33)
MCHC RBC AUTO-ENTMCNC: 32.5 G/DL (ref 31.5–35.7)
MCV RBC AUTO: 95.7 FL (ref 79–97)
MONOCYTES # BLD AUTO: 1.2 10*3/MM3 (ref 0.1–0.9)
MONOCYTES NFR BLD AUTO: 9.7 % (ref 5–12)
NEUTROPHILS # BLD AUTO: 9.88 10*3/MM3 (ref 1.4–7)
NEUTROPHILS NFR BLD AUTO: 79.9 % (ref 42.7–76)
NRBC BLD AUTO-RTO: 0 /100 WBC (ref 0–0)
PATH REPORT.FINAL DX SPEC: NORMAL
PATH REPORT.GROSS SPEC: NORMAL
PLATELET # BLD AUTO: 285 10*3/MM3 (ref 140–450)
PMV BLD AUTO: 9.7 FL (ref 6–12)
POTASSIUM BLD-SCNC: 4.2 MMOL/L (ref 3.5–5.2)
RBC # BLD AUTO: 3.96 10*6/MM3 (ref 3.77–5.28)
SODIUM BLD-SCNC: 140 MMOL/L (ref 136–145)
WBC NRBC COR # BLD: 12.37 10*3/MM3 (ref 3.4–10.8)

## 2019-02-14 PROCEDURE — 80048 BASIC METABOLIC PNL TOTAL CA: CPT | Performed by: SURGERY

## 2019-02-14 PROCEDURE — 99024 POSTOP FOLLOW-UP VISIT: CPT | Performed by: SURGERY

## 2019-02-14 PROCEDURE — 85025 COMPLETE CBC W/AUTO DIFF WBC: CPT | Performed by: SURGERY

## 2019-02-14 PROCEDURE — 25010000003 CEFAZOLIN SODIUM-DEXTROSE 2-3 GM-%(50ML) RECONSTITUTED SOLUTION: Performed by: SURGERY

## 2019-02-14 PROCEDURE — G0378 HOSPITAL OBSERVATION PER HR: HCPCS

## 2019-02-14 RX ORDER — OXYCODONE HYDROCHLORIDE AND ACETAMINOPHEN 5; 325 MG/1; MG/1
1 TABLET ORAL EVERY 4 HOURS PRN
Qty: 30 TABLET | Refills: 0 | Status: SHIPPED | OUTPATIENT
Start: 2019-02-14 | End: 2019-02-24

## 2019-02-14 RX ORDER — LEVOFLOXACIN 500 MG/1
500 TABLET, FILM COATED ORAL DAILY
Qty: 10 TABLET | Refills: 0 | Status: SHIPPED | OUTPATIENT
Start: 2019-02-14 | End: 2019-02-24

## 2019-02-14 RX ORDER — PROMETHAZINE HYDROCHLORIDE 12.5 MG/1
12.5 TABLET ORAL EVERY 6 HOURS PRN
Qty: 10 TABLET | Refills: 0 | Status: SHIPPED | OUTPATIENT
Start: 2019-02-14 | End: 2019-03-16

## 2019-02-14 RX ADMIN — CEFAZOLIN SODIUM 2 G: 2 SOLUTION INTRAVENOUS at 02:00

## 2019-02-14 RX ADMIN — HYDROCODONE BITARTRATE AND ACETAMINOPHEN 1 TABLET: 5; 325 TABLET ORAL at 09:03

## 2019-02-14 RX ADMIN — HYDROCODONE BITARTRATE AND ACETAMINOPHEN 1 TABLET: 5; 325 TABLET ORAL at 10:03

## 2019-02-14 RX ADMIN — HYDROCODONE BITARTRATE AND ACETAMINOPHEN 2 TABLET: 5; 325 TABLET ORAL at 15:30

## 2019-02-14 RX ADMIN — HYDROCODONE BITARTRATE AND ACETAMINOPHEN 2 TABLET: 5; 325 TABLET ORAL at 04:03

## 2019-02-14 RX ADMIN — SODIUM CHLORIDE, POTASSIUM CHLORIDE, SODIUM LACTATE AND CALCIUM CHLORIDE 100 ML/HR: 600; 310; 30; 20 INJECTION, SOLUTION INTRAVENOUS at 04:03

## 2019-02-14 RX ADMIN — KETOTIFEN FUMARATE 1 DROP: 0.35 SOLUTION/ DROPS OPHTHALMIC at 08:50

## 2019-02-14 RX ADMIN — CEFAZOLIN SODIUM 2 G: 2 SOLUTION INTRAVENOUS at 10:04

## 2019-02-14 NOTE — DISCHARGE INSTRUCTIONS
GALLBLADDER  POST OP RECOMMENDATIONS  DR. MILES  [phone]  ACTIVITIES:  1. Expect to rest most of the day of surgery, but do get up several times daily to reduce the risk of getting a clot in your legs.  2. No strenuous activity or lifting over 10 lbs. for 2 week.  3. Do not drive while on pain medicine.  You must be off pain meds 24 hours before driving.    SYMPTOMS:  1. Diarrhea and loose stools are common after gall bladder surgery.  This should resolve over the next few weeks.  Constipation is common when taking pain medication for surgery.  Over the counter laxatives, such as Miralax, can be used temporarily.   2. Fatigue and decreased stamina is not unusual for about a week or so after surgery.  Try to take walks and some mild activity between resting.  3. Shoulder pain is not unusual from the “gas” used in laparoscopy which will dissipate within 1-3 days.  If it does not, call your physician.    WOUND SITE:  1. Dressings can be removed 2 days after procedure if desired.  The “tega-derm” may be removed in 2 days if instructed to.  2. Dressings may occasionally have spots of blood on them.  As long as it is dry, these do not need to be changed.  If it is soaked, then the dressing should be removed and a new dressing placed.  3. Skin irritation, redness or itching can prompt removal of the bandage earlier if present.  4. Steri-strips are to be left in place until they fall off on their own in 1-2 weeks.  If they are irritating then they may be removed sooner.  5. Showering may occur while the “tega-derm” is in place.  If it is off, then you must wait 2 days after surgery before showering.    MEALS:  1. Eat and Drink very lightly when returning home following surgery.  Jell-O, ginger ale, chicken noodle soup and crackers are good examples.  The day after surgery you may broaden your diet.  2. Do NOT take pain pills on an empty stomach.    WORK:  1. In general if you have a sedentary job, you can return to work  in 7-10 days.  If heavy lifting is required it may be 2 weeks.    2. Use discretion and remember that your stamina will be decreased post operatively.  3. Return to work notes can be provided at the time of your post-operative appointment.    FOLLOW UP:  1. Call and make a post-operative appointment for approximately 2 weeks after the procedure.      Susan Donis MD  General, Minimally Invasive and Endoscopic Surgery  [facility]    [address] [facility] [address] [place] [address]    [address]  [address] [address]    P: [phone]  F: [phone]    Cc:  León Sanabria MD

## 2019-02-14 NOTE — NURSING NOTE
Case Management Discharge Note    Final Note: dc home to self care    Destination      No service has been selected for the patient.      Durable Medical Equipment      No service has been selected for the patient.      Dialysis/Infusion      No service has been selected for the patient.      Home Medical Care      No service has been selected for the patient.      Community Resources      No service has been selected for the patient.             Final Discharge Disposition Code: 01 - home or self-care

## 2019-02-14 NOTE — PLAN OF CARE
Problem: Patient Care Overview  Goal: Plan of Care Review  Outcome: Ongoing (interventions implemented as appropriate)   02/14/19 8889   Coping/Psychosocial   Plan of Care Reviewed With patient   Plan of Care Review   Progress improving   OTHER   Outcome Summary POD #1, Nayeli, clear liquid diet, to advance this AM, low grade fever, IS incouraged, tolerated well, STANISLAV with scant, non measurable output, BS hypo, drg small amount dry drainage, anticipates going home today      Goal: Individualization and Mutuality  Outcome: Ongoing (interventions implemented as appropriate)      Problem: Cholecystectomy (Adult)  Goal: Signs and Symptoms of Listed Potential Problems Will be Absent, Minimized or Managed (Cholecystectomy)  Outcome: Ongoing (interventions implemented as appropriate)

## 2019-02-14 NOTE — PROGRESS NOTES
Chief Complaint: POD # 1    Subjective   Pt tolerating regular diet, pain controlled, ambulating in urban  Objective     Vital signs in last 24 hours:  Temp:  [97.5 °F (36.4 °C)-100.2 °F (37.9 °C)] 97.5 °F (36.4 °C)  Heart Rate:  [72-97] 76  Resp:  [13-19] 16  BP: (102-150)/(62-77) 110/67    Intake/Output last 3 shifts:  I/O last 3 completed shifts:  In: 2030 [P.O.:480; I.V.:1550]  Out: 1610 [Urine:1550; Emesis/NG output:5; Drains:40; Blood:15]    Intake/Output this shift:  I/O this shift:  In: 978.3 [P.O.:240; I.V.:738.3]  Out: 320 [Urine:300; Drains:20]    Physical Exam:  Respiratory: CTA, good inspiratory effort  CV: RRR  Abd: + BS, soft, ND, usual post-op tenderness, no rebound, no guarding, incisions look good, STANISLAV serous    Assessment/Plan   S/P lap daquan  D/c STANISLAV   D/c home   F/u with Dr. Sanchez in 1 week     Susan Donis MD  General, Minimally Invasive and Endoscopic Surgery  [facility]    [address] [facility] [address] [place] [address]    [address]  [place] [address]               [place] [address]    P: [phone]  F: [phone]    Cc:  León Sanabria MD

## 2019-02-15 ENCOUNTER — READMISSION MANAGEMENT (OUTPATIENT)
Dept: CALL CENTER | Facility: HOSPITAL | Age: 59
End: 2019-02-15

## 2019-02-18 ENCOUNTER — READMISSION MANAGEMENT (OUTPATIENT)
Dept: CALL CENTER | Facility: HOSPITAL | Age: 59
End: 2019-02-18

## 2019-02-18 NOTE — OUTREACH NOTE
[facility] < 7 Survey      Responses   Facility patient discharged from?  [facility]   Does the patient have one of the following disease processes/diagnoses(primary or secondary)?  Other   Is there a successful TCM telephone encounter documented?  No   [facility] <7 Attempt successful?  Yes   Revoke  Decline to participate          Tyrese Gabriel RN

## 2019-02-20 ENCOUNTER — OFFICE VISIT (OUTPATIENT)
Dept: SURGERY | Facility: CLINIC | Age: 59
End: 2019-02-20

## 2019-02-20 DIAGNOSIS — Z09 SURGICAL FOLLOW-UP CARE: Primary | ICD-10-CM

## 2019-02-20 PROCEDURE — 99024 POSTOP FOLLOW-UP VISIT: CPT | Performed by: SURGERY

## 2019-02-20 NOTE — PROGRESS NOTES
7 day PO lap daquan - states she is weak and she is not sleeping  As above.  She is off the pain medication.  She is eating well.  Her incisions are healing well there is no impulse.  There is no cellulitis.  Her pathology was S.  Recommended she increase her activity and possibly try over-the-counter Benadryl for sleep.  I will will see her back in 1 month

## 2019-03-05 NOTE — OUTREACH NOTE
Breast Surgery Surveillance Visit    Diagnosis: Right breast cancer status post lumpectomy and sentinel lymph node biopsy on December 3, 2018.     Stage: T1b N0 MX    Disease Status:  Surgical treatment complete, endocrine therapy and and radiation oncology Prep Survey      Responses   Facility patient discharged from?  [facility]   Is LACE score < 7 ?  Yes   Is patient eligible?  Yes   Discharge diagnosis  Calculus of gallbladder without cholecystitis without obstruction   Does the patient have one of the following disease processes/diagnoses(primary or secondary)?  Other   Does the patient have Home health ordered?  No   Is there a DME ordered?  No   Prep survey completed?  Yes          Tamia Randle RN         Laterality Date   • APPENDECTOMY     • BREAST SENTINEL LYMPH NODE BIOPSY Right 12/3/2018    Performed by Padmini Donato MD at [facility] MAIN OR   • COLONOSCOPY  2012,6/8/04   • FRACTURE SURGERY Right     ankle-hardware in place   • HERNIA SURGERY      \"herni EVERY NIGHT AT BEDTIME Disp: 90 tablet Rfl: 2   FAMOTIDINE 20 MG Oral Tab TAKE 1 TABLET (20 MG) BY ORAL ROUTE ONCE DAILY AT BEDTIME Disp: 30 tablet Rfl: 10   cyanocobalamin (VITAMIN B12) 1000 MCG/ML Injection Solution Inject 1,000 mcg into the muscle daily exertion, emphysema, chronic bronchitis, shortness of breath or abnormal sound when breathing. Cardiovascular:   There is no history of chest pain, chest pressure/discomfort, palpitations, irregular heartbeat, fainting or near-fainting, difficulty breat Location: Left arm, Patient Position: Sitting, Cuff Size: adult)   Pulse 86   Temp 97.8 °F (36.6 °C) (Oral)   Resp 18   Ht 1.524 m (5')   Wt 75.8 kg (167 lb)   LMP  (LMP Unknown)   SpO2 96%   BMI 32.61 kg/m²     Physical Examination:   Examination shows th prior to starting radiation. I anticipate will obtain right breast imaging in approximately 6 months.   I encouraged her to continue monitoring her ROM and strength and explained that a referral to physical therapy may be warranted in the future if she zoë

## 2019-03-07 DIAGNOSIS — N95.2 ATROPHIC VAGINITIS: ICD-10-CM

## 2019-03-07 RX ORDER — CONJUGATED ESTROGENS 0.62 MG/1
TABLET, FILM COATED ORAL
Qty: 21 TABLET | Refills: 1 | Status: SHIPPED | OUTPATIENT
Start: 2019-03-07 | End: 2019-04-16 | Stop reason: SDUPTHER

## 2019-03-20 ENCOUNTER — OFFICE VISIT (OUTPATIENT)
Dept: SURGERY | Facility: CLINIC | Age: 59
End: 2019-03-20

## 2019-03-20 DIAGNOSIS — Z09 SURGICAL FOLLOW-UP CARE: Primary | ICD-10-CM

## 2019-03-20 PROCEDURE — 99024 POSTOP FOLLOW-UP VISIT: CPT | Performed by: SURGERY

## 2019-03-20 NOTE — PROGRESS NOTES
5 wk PO maribel daquan - states she does still have some nausea when eating, she states she will eat several bites of food and start feeling the nausea  He has some nausea with eating.  She denies any fevers or chills.  She says the nausea is improving.  She denies any jaundice symptoms.  Her incisions are healing well there is no clinical jaundice there is no impulse.  I discussed the risk benefits and options with her in detail we will try her on over-the-counter Zantac and she will let me know if that is ineffective.  I will see her back in 1 month since she is having some minor problems

## 2019-03-25 RX ORDER — MONTELUKAST SODIUM 10 MG/1
TABLET ORAL
Qty: 90 TABLET | Refills: 1 | Status: SHIPPED | OUTPATIENT
Start: 2019-03-25 | End: 2019-09-29 | Stop reason: SDUPTHER

## 2019-04-16 ENCOUNTER — OFFICE VISIT (OUTPATIENT)
Dept: SURGERY | Facility: CLINIC | Age: 59
End: 2019-04-16

## 2019-04-16 DIAGNOSIS — N95.2 ATROPHIC VAGINITIS: ICD-10-CM

## 2019-04-16 DIAGNOSIS — Z09 SURGICAL FOLLOW-UP CARE: Primary | ICD-10-CM

## 2019-04-16 PROCEDURE — 99024 POSTOP FOLLOW-UP VISIT: CPT | Performed by: SURGERY

## 2019-04-16 RX ORDER — CONJUGATED ESTROGENS 0.62 MG/1
TABLET, FILM COATED ORAL
Qty: 21 TABLET | Refills: 5 | Status: SHIPPED | OUTPATIENT
Start: 2019-04-16 | End: 2019-10-13 | Stop reason: SDUPTHER

## 2019-04-16 NOTE — PROGRESS NOTES
9 wk PO lap daquan - no problems at this time  She is status post laparoscopic cholecystectomy she feels well she is without complaints.  She has no clinical jaundice.  Her incisions are healing well.  There is no impulse.  We will see her back as needed

## 2019-05-13 RX ORDER — FLUOXETINE 20 MG/1
TABLET, FILM COATED ORAL
Qty: 90 TABLET | Refills: 1 | Status: SHIPPED | OUTPATIENT
Start: 2019-05-13 | End: 2019-10-23 | Stop reason: ALTCHOICE

## 2019-05-15 ENCOUNTER — TRANSCRIBE ORDERS (OUTPATIENT)
Dept: ADMINISTRATIVE | Facility: HOSPITAL | Age: 59
End: 2019-05-15

## 2019-05-15 DIAGNOSIS — Z12.31 VISIT FOR SCREENING MAMMOGRAM: Primary | ICD-10-CM

## 2019-05-28 ENCOUNTER — TELEPHONE (OUTPATIENT)
Dept: INTERNAL MEDICINE | Facility: CLINIC | Age: 59
End: 2019-05-28

## 2019-06-20 ENCOUNTER — HOSPITAL ENCOUNTER (OUTPATIENT)
Dept: MAMMOGRAPHY | Facility: HOSPITAL | Age: 59
Discharge: HOME | End: 2019-06-20
Admitting: FAMILY MEDICINE

## 2019-06-20 ENCOUNTER — OFFICE VISIT (OUTPATIENT)
Dept: INTERNAL MEDICINE | Facility: CLINIC | Age: 59
End: 2019-06-20

## 2019-06-20 VITALS
HEART RATE: 72 BPM | TEMPERATURE: 98.1 F | HEIGHT: 62 IN | RESPIRATION RATE: 16 BRPM | OXYGEN SATURATION: 97 % | SYSTOLIC BLOOD PRESSURE: 136 MMHG | WEIGHT: 182 LBS | DIASTOLIC BLOOD PRESSURE: 84 MMHG | BODY MASS INDEX: 33.49 KG/M2

## 2019-06-20 DIAGNOSIS — Z00.00 ROUTINE HEALTH MAINTENANCE: ICD-10-CM

## 2019-06-20 DIAGNOSIS — R19.7 DIARRHEA, UNSPECIFIED TYPE: ICD-10-CM

## 2019-06-20 DIAGNOSIS — G44.229 CHRONIC TENSION-TYPE HEADACHE, NOT INTRACTABLE: ICD-10-CM

## 2019-06-20 DIAGNOSIS — J30.9 ALLERGIC RHINITIS, UNSPECIFIED SEASONALITY, UNSPECIFIED TRIGGER: ICD-10-CM

## 2019-06-20 DIAGNOSIS — R91.8 PULMONARY NODULES: ICD-10-CM

## 2019-06-20 DIAGNOSIS — B37.2 INTERTRIGINOUS CANDIDIASIS: ICD-10-CM

## 2019-06-20 DIAGNOSIS — G47.00 INSOMNIA, UNSPECIFIED TYPE: ICD-10-CM

## 2019-06-20 DIAGNOSIS — Z12.31 VISIT FOR SCREENING MAMMOGRAM: ICD-10-CM

## 2019-06-20 DIAGNOSIS — F32.A DEPRESSION, UNSPECIFIED DEPRESSION TYPE: Primary | ICD-10-CM

## 2019-06-20 DIAGNOSIS — N95.2 ATROPHIC VAGINITIS: ICD-10-CM

## 2019-06-20 DIAGNOSIS — R73.03 PREDIABETES: ICD-10-CM

## 2019-06-20 PROCEDURE — 99214 OFFICE O/P EST MOD 30 MIN: CPT | Performed by: FAMILY MEDICINE

## 2019-06-20 PROCEDURE — 77067 SCR MAMMO BI INCL CAD: CPT

## 2019-06-20 PROCEDURE — 77063 BREAST TOMOSYNTHESIS BI: CPT

## 2019-06-20 RX ORDER — CLOBETASOL PROPIONATE 0.5 MG/G
CREAM TOPICAL
Qty: 15 G | Refills: 0 | Status: SHIPPED | OUTPATIENT
Start: 2019-06-20 | End: 2021-11-11 | Stop reason: SDUPTHER

## 2019-06-20 RX ORDER — NYSTATIN AND TRIAMCINOLONE ACETONIDE 100000; 1 [USP'U]/G; MG/G
OINTMENT TOPICAL 3 TIMES DAILY
Qty: 15 G | Refills: 1 | Status: SHIPPED | OUTPATIENT
Start: 2019-06-20 | End: 2019-08-26 | Stop reason: SDUPTHER

## 2019-06-20 RX ORDER — CLOBETASOL PROPIONATE 0.5 MG/G
CREAM TOPICAL
Qty: 15 G | Refills: 0
Start: 2019-06-20 | End: 2019-06-20 | Stop reason: SDUPTHER

## 2019-06-20 RX ORDER — TRIAMCINOLONE ACETONIDE 0.25 MG/G
CREAM TOPICAL DAILY PRN
Qty: 15 G | Refills: 0
Start: 2019-06-20 | End: 2020-04-09

## 2019-06-20 RX ORDER — TIZANIDINE 4 MG/1
4 TABLET ORAL NIGHTLY PRN
Qty: 30 TABLET | Refills: 1 | Status: SHIPPED | OUTPATIENT
Start: 2019-06-20 | End: 2019-09-18 | Stop reason: SDUPTHER

## 2019-06-20 NOTE — PROGRESS NOTES
Subjective     Lilliam Portillo is a 58 y.o. female, who presents with a chief complaint of   Chief Complaint   Patient presents with   • Prediabetes   • Rash     under breasts   • Depression     doing better   • Insomnia     doing better       Depression Patient presents with the following symptoms: insomnia.    Allergies   Associated symptoms include headaches and a rash.   Diabetes   Hypoglycemia symptoms include headaches.   Rash   Associated symptoms include diarrhea. Her past medical history is significant for allergies.   Insomnia   Associated symptoms include headaches and a rash.      1. Depression.  Takes bupropion and fluoxetine. She reports her symptoms are well-controlled.    2. Insomnia.  Trazodone is helping.  Able to sleep well with this.  Hasn't been needing to use this much recently since she retired.    3. Allergic rhinitis.  Reports symptoms are controlled with nasal steroid.    4. Atrophic vaginitis.  Controlled with Premarin.    5. Tension headaches.  She would like to try a muscle relaxants.  Denies other neurologist symptoms.    The following portions of the patient's history were reviewed and updated as appropriate: allergies, current medications, past family history, past medical history, past social history, past surgical history and problem list.    Allergies: Contrast dye    Review of Systems   Constitutional: Negative.    Eyes: Negative.    Respiratory: Negative.    Cardiovascular: Negative.    Gastrointestinal: Positive for diarrhea.   Endocrine: Negative.    Genitourinary: Negative.    Musculoskeletal: Negative.    Skin: Positive for rash.   Allergic/Immunologic: Positive for environmental allergies.   Neurological: Positive for headaches.   Hematological: Negative.    Psychiatric/Behavioral: Positive for sleep disturbance. The patient has insomnia.        Objective     Wt Readings from Last 3 Encounters:   06/20/19 82.6 kg (182 lb)   02/13/19 83.6 kg (184 lb 6.4 oz)   02/06/19 77.1 kg  (170 lb)     Temp Readings from Last 3 Encounters:   06/20/19 98.1 °F (36.7 °C) (Oral)   02/14/19 97.5 °F (36.4 °C) (Oral)   02/04/19 98.3 °F (36.8 °C)     BP Readings from Last 3 Encounters:   06/20/19 136/84   02/14/19 110/67   02/06/19 140/78     Pulse Readings from Last 3 Encounters:   06/20/19 72   02/14/19 76   02/04/19 66     Body mass index is 33.29 kg/m².    Physical Exam   Constitutional: She is oriented to person, place, and time. She appears well-developed and well-nourished.   HENT:   Head: Normocephalic and atraumatic.   Mouth/Throat: Oropharynx is clear and moist.   Eyes: Conjunctivae and EOM are normal.   Neck: Neck supple. No thyromegaly present.   Cardiovascular: Normal rate, regular rhythm and normal heart sounds.   Pulmonary/Chest: Effort normal and breath sounds normal.   Abdominal: Soft. Bowel sounds are normal. There is no hepatosplenomegaly.   Musculoskeletal: Normal range of motion. She exhibits no edema.   Neurological: She is alert and oriented to person, place, and time.   Skin: Skin is warm and dry. No rash noted.   Psychiatric: She has a normal mood and affect. Her behavior is normal.   Nursing note and vitals reviewed.    Assessment/Plan   Lilliam was seen today for prediabetes, rash, depression and insomnia.    Diagnoses and all orders for this visit:    Depression, unspecified depression type  -     CBC & Differential  -     TSH    Insomnia, unspecified type    Allergic rhinitis, unspecified seasonality, unspecified trigger    Atrophic vaginitis    Prediabetes  -     Comprehensive Metabolic Panel  -     Lipid Panel With / Chol / HDL Ratio  -     Vitamin B12  -     Hemoglobin A1c    Pulmonary nodules    Routine health maintenance    Chronic tension-type headache, not intractable  -     tiZANidine (ZANAFLEX) 4 MG tablet; Take 1 tablet by mouth At Night As Needed for Muscle Spasms.    Diarrhea, unspecified type  -     Celiac Panel Reflex To Titer    Intertriginous candidiasis    Other  orders  -     triamcinolone (KENALOG) 0.025 % cream; Apply  topically to the appropriate area as directed Daily As Needed for Rash.  -     Discontinue: clobetasol prop emollient base (TEMOVATE) 0.05 % emollient cream; Apply to affected areas twice daily  -     clobetasol prop emollient base (TEMOVATE) 0.05 % emollient cream; Apply to affected areas twice daily  -     nystatin-triamcinolone (MYCOLOG) 813103-1.1 UNIT/GM-% ointment; Apply  topically to the appropriate area as directed 3 (Three) Times a Day.    1. Depression.  Controlled.  Continue bupropion and fluoxetine.    2. Insomnia.  Benefiting from prn trazodone and lifestyle measures.  Continue same.    3. Allergic rhinitis. Continue nasal steroid; Omnaris works the best for her.    4. Atrophic vaginitis.  Controlled with Premarin. We discussed risks and benefits.  She is UTD on mammogram.     5. Prediabetes.  Last A1c 5.9.  Recheck labs today.  Continue lifestyle measures.      6. Pulmonary nodule.  Two years of stability documented.  Radiologist recommended no further imaging.    7. Routine health maint.  2nd hep A vaccine today.   Mammogram was done earlier today.  Doesn't need a pap smear.    8. Tension headaches.  Trial of muscle relaxant.    9. Intertriginous candidiasis. Under breasts.  Nystatin triamcinolone.    10. Intermittent diarrhea after eating.  Check celiac panel.  Trial off dairy X 2 weeks.  Trial of probiotic and fiber.    Current Outpatient Medications:   •  albuterol (PROVENTIL) (2.5 MG/3ML) 0.083% nebulizer solution, Take 2.5 mg by nebulization Every 4 (Four) Hours As Needed for Wheezing or Shortness of Air., Disp: , Rfl:   •  Biotin 300 MCG tablet, Take 1 tablet by mouth Daily., Disp: , Rfl:   •  buPROPion XL (WELLBUTRIN XL) 300 MG 24 hr tablet, Take 300 mg by mouth Every Night., Disp: , Rfl:   •  Cholecalciferol (VITAMIN D3) 2000 UNITS tablet, Take 1 tablet by mouth Daily., Disp: , Rfl:   •  ciclesonide (OMNARIS) 50 MCG/ACT nasal spray, 2  sprays by Each Nare route Daily. (Patient taking differently: 2 sprays by Each Nare route Daily As Needed for Allergies.), Disp: 1 each, Rfl: 11  •  clobetasol prop emollient base (TEMOVATE) 0.05 % emollient cream, Apply to affected areas twice daily, Disp: 15 g, Rfl: 0  •  dicyclomine (BENTYL) 20 MG tablet, Take 20 mg by mouth Every 6 (Six) Hours., Disp: , Rfl:   •  FLUoxetine (PROzac) 20 MG capsule, Take 20 mg by mouth Every Night., Disp: , Rfl:   •  FLUoxetine (PROzac) 20 MG tablet, TAKE ONE TABLET BY MOUTH DAILY, Disp: 90 tablet, Rfl: 1  •  Multiple Vitamin (MULTI VITAMIN) tablet, Take 1 tablet by mouth Daily., Disp: , Rfl:   •  naproxen (NAPROSYN) 500 MG tablet, Take 500 mg by mouth 2 (Two) Times a Day With Meals., Disp: , Rfl:   •  Nutritional Supplements (DHEA PO), Take 1 tablet by mouth Daily., Disp: , Rfl:   •  olopatadine (PATANOL) 0.1 % ophthalmic solution, Administer 1 drop to both eyes 2 (Two) Times a Day., Disp: 5 mL, Rfl: 12  •  ondansetron (ZOFRAN) 4 MG tablet, Take 4 mg by mouth Every 8 (Eight) Hours As Needed for Nausea or Vomiting., Disp: , Rfl:   •  PREMARIN 0.625 MG tablet, TAKE ONE TABLET BY MOUTH DAILY FOR 21 DAYS * DO NOT TAKE FOR 7 DAYS, Disp: 21 tablet, Rfl: 5  •  traZODone (DESYREL) 50 MG tablet, Take 50 mg by mouth At Night As Needed for Sleep., Disp: , Rfl:   •  triamcinolone (KENALOG) 0.025 % cream, Apply  topically to the appropriate area as directed Daily As Needed for Rash., Disp: 15 g, Rfl: 0  •  montelukast (SINGULAIR) 10 MG tablet, TAKE ONE TABLET BY MOUTH ONCE NIGHTLY, Disp: 90 tablet, Rfl: 1  •  nystatin-triamcinolone (MYCOLOG) 228171-5.1 UNIT/GM-% ointment, Apply  topically to the appropriate area as directed 3 (Three) Times a Day., Disp: 15 g, Rfl: 1  •  tiZANidine (ZANAFLEX) 4 MG tablet, Take 1 tablet by mouth At Night As Needed for Muscle Spasms., Disp: 30 tablet, Rfl: 1  Medications Discontinued During This Encounter   Medication Reason   • clobetasol prop emollient base  (TEMOVATE) 0.05 % emollient cream Reorder       Return in about 6 months (around 12/20/2019).

## 2019-06-21 LAB
ALBUMIN SERPL-MCNC: 4.8 G/DL (ref 3.5–5.2)
ALBUMIN/GLOB SERPL: 2.3 G/DL
ALP SERPL-CCNC: 96 U/L (ref 39–117)
ALT SERPL-CCNC: 24 U/L (ref 1–33)
AST SERPL-CCNC: 27 U/L (ref 1–32)
BASOPHILS # BLD AUTO: 0.03 10*3/MM3 (ref 0–0.2)
BASOPHILS NFR BLD AUTO: 0.6 % (ref 0–1.5)
BILIRUB SERPL-MCNC: 0.2 MG/DL (ref 0.2–1.2)
BUN SERPL-MCNC: 16 MG/DL (ref 6–20)
BUN/CREAT SERPL: 22.2 (ref 7–25)
CALCIUM SERPL-MCNC: 9.4 MG/DL (ref 8.6–10.5)
CHLORIDE SERPL-SCNC: 103 MMOL/L (ref 98–107)
CHOLEST SERPL-MCNC: 202 MG/DL (ref 0–200)
CHOLEST/HDLC SERPL: 2.17 {RATIO}
CO2 SERPL-SCNC: 27.7 MMOL/L (ref 22–29)
CREAT SERPL-MCNC: 0.72 MG/DL (ref 0.57–1)
EOSINOPHIL # BLD AUTO: 0.09 10*3/MM3 (ref 0–0.4)
EOSINOPHIL NFR BLD AUTO: 1.9 % (ref 0.3–6.2)
ERYTHROCYTE [DISTWIDTH] IN BLOOD BY AUTOMATED COUNT: 12.8 % (ref 12.3–15.4)
GLIADIN PEPTIDE IGA SER-ACNC: 4 UNITS (ref 0–19)
GLOBULIN SER CALC-MCNC: 2.1 GM/DL
GLUCOSE SERPL-MCNC: 105 MG/DL (ref 65–99)
HBA1C MFR BLD: 5.9 % (ref 4.8–5.6)
HCT VFR BLD AUTO: 43.8 % (ref 34–46.6)
HDLC SERPL-MCNC: 93 MG/DL (ref 40–60)
HGB BLD-MCNC: 14.1 G/DL (ref 12–15.9)
IGA SERPL-MCNC: 150 MG/DL (ref 87–352)
IMM GRANULOCYTES # BLD AUTO: 0.01 10*3/MM3 (ref 0–0.05)
IMM GRANULOCYTES NFR BLD AUTO: 0.2 % (ref 0–0.5)
LDLC SERPL CALC-MCNC: 86 MG/DL (ref 0–100)
LYMPHOCYTES # BLD AUTO: 1.97 10*3/MM3 (ref 0.7–3.1)
LYMPHOCYTES NFR BLD AUTO: 42.3 % (ref 19.6–45.3)
MCH RBC QN AUTO: 30.9 PG (ref 26.6–33)
MCHC RBC AUTO-ENTMCNC: 32.2 G/DL (ref 31.5–35.7)
MCV RBC AUTO: 95.8 FL (ref 79–97)
MONOCYTES # BLD AUTO: 0.54 10*3/MM3 (ref 0.1–0.9)
MONOCYTES NFR BLD AUTO: 11.6 % (ref 5–12)
NEUTROPHILS # BLD AUTO: 2.02 10*3/MM3 (ref 1.7–7)
NEUTROPHILS NFR BLD AUTO: 43.4 % (ref 42.7–76)
NRBC BLD AUTO-RTO: 0 /100 WBC (ref 0–0.2)
PLATELET # BLD AUTO: 288 10*3/MM3 (ref 140–450)
POTASSIUM SERPL-SCNC: 4.2 MMOL/L (ref 3.5–5.2)
PROT SERPL-MCNC: 6.9 G/DL (ref 6–8.5)
RBC # BLD AUTO: 4.57 10*6/MM3 (ref 3.77–5.28)
SODIUM SERPL-SCNC: 141 MMOL/L (ref 136–145)
TRIGL SERPL-MCNC: 116 MG/DL (ref 0–150)
TSH SERPL DL<=0.005 MIU/L-ACNC: 2.03 MIU/ML (ref 0.27–4.2)
TTG IGA SER-ACNC: <2 U/ML (ref 0–3)
VIT B12 SERPL-MCNC: 506 PG/ML (ref 211–946)
VLDLC SERPL CALC-MCNC: 23.2 MG/DL
WBC # BLD AUTO: 4.66 10*3/MM3 (ref 3.4–10.8)

## 2019-06-24 RX ORDER — BUPROPION HYDROCHLORIDE 300 MG/1
TABLET ORAL
Qty: 90 TABLET | Refills: 1 | Status: SHIPPED | OUTPATIENT
Start: 2019-06-24 | End: 2019-12-19

## 2019-07-17 NOTE — TELEPHONE ENCOUNTER
PT  AWARE        ----- Message from León Sanabria MD sent at 7/5/2018  3:35 PM EDT -----  I increased her dose to Salima.  ----- Message -----  From: Susan Hendrickson MA  Sent: 7/3/2018  11:07 AM  To: León Sanabria MD    Pt  Called needs her dose   Premarin increased, you had told her to call to let you know.      [phone]   salima Estrada         
yes

## 2019-08-26 RX ORDER — NYSTATIN AND TRIAMCINOLONE ACETONIDE 100000; 1 [USP'U]/G; MG/G
OINTMENT TOPICAL
Qty: 60 G | Refills: 0 | Status: SHIPPED | OUTPATIENT
Start: 2019-08-26 | End: 2020-04-09

## 2019-08-27 RX ORDER — TRAZODONE HYDROCHLORIDE 50 MG/1
50 TABLET ORAL NIGHTLY PRN
Qty: 90 TABLET | Refills: 0 | Status: SHIPPED | OUTPATIENT
Start: 2019-08-27 | End: 2019-11-22 | Stop reason: SDUPTHER

## 2019-08-27 NOTE — TELEPHONE ENCOUNTER
----- Message from Ynes Win MA sent at 8/22/2019  1:44 PM EDT -----  Contact: patient      ----- Message -----  From: Yolanda Palmer  Sent: 8/22/2019   8:31 AM  To: [facility] [place]    [place]    Patient comes by office with various requests.    1 - needs script for shingles shot    2 - referral to cardiologist, Jesse Hawley @ [facility]; phone number [phone]    3 - referral for Rafael Oneil MD; [facility]    4 - Trazadone to [facility] in [facility]

## 2019-09-18 DIAGNOSIS — G44.229 CHRONIC TENSION-TYPE HEADACHE, NOT INTRACTABLE: ICD-10-CM

## 2019-09-18 RX ORDER — TIZANIDINE 4 MG/1
TABLET ORAL
Qty: 30 TABLET | Refills: 0 | Status: SHIPPED | OUTPATIENT
Start: 2019-09-18 | End: 2021-03-31

## 2019-09-25 DIAGNOSIS — R07.9 CHEST PAIN, UNSPECIFIED TYPE: Primary | ICD-10-CM

## 2019-09-30 RX ORDER — MONTELUKAST SODIUM 10 MG/1
TABLET ORAL
Qty: 90 TABLET | Refills: 0 | Status: SHIPPED | OUTPATIENT
Start: 2019-09-30 | End: 2020-04-27 | Stop reason: SDUPTHER

## 2019-10-01 ENCOUNTER — TELEPHONE (OUTPATIENT)
Dept: INTERNAL MEDICINE | Facility: CLINIC | Age: 59
End: 2019-10-01

## 2019-10-01 NOTE — TELEPHONE ENCOUNTER
Spoke with patient.  She feels that this is not an official organization and has not had any business with them.  She requests that I do not respond to this request.      ----- Message from Melissa Medrano sent at 9/24/2019 12:34 PM EDT -----  Contact: KEE LEE FROM [employer] CALLED TO TALK TO YOU OR DR. WOOD ABOUT PATIENT. PLEASE CALL ROSA -[phone].

## 2019-10-09 ENCOUNTER — TELEPHONE (OUTPATIENT)
Dept: INTERNAL MEDICINE | Facility: CLINIC | Age: 59
End: 2019-10-09

## 2019-10-09 NOTE — TELEPHONE ENCOUNTER
----- Message from Clara Domínguez sent at 10/9/2019 11:57 AM EDT -----  Contact: patient came in to office  [place] pt    Patient requesting a referral for cardiologist. Was not having anything significant when she was here, but now is having pain in chest rib cage area more frequent than it used to be, every 2 or 3 days.  Asking to be referred dr christine marrufo, [address] [address]    Phone is [phone]     Please call patient back

## 2019-10-10 NOTE — TELEPHONE ENCOUNTER
Patient states the office that called was out of [place] and states that she needs to contact [place] office, Is there any way we can make sure the referral went to the correct office

## 2019-10-13 DIAGNOSIS — N95.2 ATROPHIC VAGINITIS: ICD-10-CM

## 2019-10-14 RX ORDER — CONJUGATED ESTROGENS 0.62 MG/1
TABLET, FILM COATED ORAL
Qty: 30 TABLET | Refills: 4 | Status: SHIPPED | OUTPATIENT
Start: 2019-10-14 | End: 2020-04-16

## 2019-10-23 ENCOUNTER — TELEPHONE (OUTPATIENT)
Dept: INTERNAL MEDICINE | Facility: CLINIC | Age: 59
End: 2019-10-23

## 2019-10-23 RX ORDER — FLUOXETINE HYDROCHLORIDE 20 MG/1
20 CAPSULE ORAL NIGHTLY
Qty: 90 CAPSULE | Refills: 1 | Status: SHIPPED | OUTPATIENT
Start: 2019-10-23 | End: 2020-04-20

## 2019-10-23 NOTE — TELEPHONE ENCOUNTER
----- Message from Yolanda Palmer sent at 10/23/2019  9:45 AM EDT -----  Contact: [phone] [facility]  [place]    Fluoxetine 20 mg tablets    Calling on behalf of the patient's plan.  Want to switch from tablets to capsules.    Patient has given okay but they require the approval of provider as well.

## 2019-10-24 ENCOUNTER — TELEPHONE (OUTPATIENT)
Dept: INTERNAL MEDICINE | Facility: CLINIC | Age: 59
End: 2019-10-24

## 2019-10-24 NOTE — TELEPHONE ENCOUNTER
APPT SAHARA'D NOV 13 830AM ---THE INFO HAD BEEN SENT AND WAS SENT TO THE WRONG LOCATION. THE APPT JUST NEEDED TO BE MADE AND THEN INFO NEEDED TO BE FAXED FROM THE OFFICE. APPT IS MADE --INFO FAXED---PT NOTIFED---SHE WAS NOTIFIED IF HER CP IS WORSE SHE NEEDS EVAL IN ER OR OUR OFFICE

## 2019-11-14 RX ORDER — NAPROXEN 500 MG/1
TABLET ORAL
Qty: 60 TABLET | Refills: 3 | Status: SHIPPED | OUTPATIENT
Start: 2019-11-14 | End: 2020-03-11

## 2019-11-22 RX ORDER — TRAZODONE HYDROCHLORIDE 50 MG/1
TABLET ORAL
Qty: 90 TABLET | Refills: 0 | Status: SHIPPED | OUTPATIENT
Start: 2019-11-22 | End: 2020-02-25

## 2019-12-02 RX ORDER — FLUOXETINE 20 MG/1
TABLET, FILM COATED ORAL
Qty: 90 TABLET | Refills: 0 | Status: SHIPPED | OUTPATIENT
Start: 2019-12-02 | End: 2020-04-20

## 2019-12-09 ENCOUNTER — OUTSIDE FACILITY SERVICE (OUTPATIENT)
Dept: CARDIOLOGY | Facility: CLINIC | Age: 59
End: 2019-12-09

## 2019-12-09 PROCEDURE — 93018 CV STRESS TEST I&R ONLY: CPT | Performed by: INTERNAL MEDICINE

## 2019-12-09 PROCEDURE — 78452 HT MUSCLE IMAGE SPECT MULT: CPT | Performed by: INTERNAL MEDICINE

## 2019-12-12 ENCOUNTER — OFFICE VISIT (OUTPATIENT)
Dept: INTERNAL MEDICINE | Facility: CLINIC | Age: 59
End: 2019-12-12

## 2019-12-12 VITALS
RESPIRATION RATE: 16 BRPM | WEIGHT: 188 LBS | OXYGEN SATURATION: 98 % | BODY MASS INDEX: 34.6 KG/M2 | DIASTOLIC BLOOD PRESSURE: 80 MMHG | TEMPERATURE: 98.3 F | HEIGHT: 62 IN | HEART RATE: 83 BPM | SYSTOLIC BLOOD PRESSURE: 140 MMHG

## 2019-12-12 DIAGNOSIS — N95.2 ATROPHIC VAGINITIS: ICD-10-CM

## 2019-12-12 DIAGNOSIS — J30.9 ALLERGIC RHINITIS, UNSPECIFIED SEASONALITY, UNSPECIFIED TRIGGER: ICD-10-CM

## 2019-12-12 DIAGNOSIS — Z00.00 ROUTINE HEALTH MAINTENANCE: ICD-10-CM

## 2019-12-12 DIAGNOSIS — R73.03 PREDIABETES: ICD-10-CM

## 2019-12-12 DIAGNOSIS — G47.00 INSOMNIA, UNSPECIFIED TYPE: ICD-10-CM

## 2019-12-12 DIAGNOSIS — F32.A DEPRESSION, UNSPECIFIED DEPRESSION TYPE: Primary | ICD-10-CM

## 2019-12-12 DIAGNOSIS — M51.36 DEGENERATION OF INTERVERTEBRAL DISC OF LUMBAR REGION: ICD-10-CM

## 2019-12-12 DIAGNOSIS — R91.8 PULMONARY NODULES: ICD-10-CM

## 2019-12-12 PROBLEM — R10.11 RUQ PAIN: Status: RESOLVED | Noted: 2019-02-04 | Resolved: 2019-12-12

## 2019-12-12 PROBLEM — K80.20 CALCULUS OF GALLBLADDER WITHOUT CHOLECYSTITIS WITHOUT OBSTRUCTION: Status: RESOLVED | Noted: 2019-02-06 | Resolved: 2019-12-12

## 2019-12-12 PROBLEM — K80.20 CALCULUS OF GALLBLADDER WITHOUT CHOLECYSTITIS: Status: RESOLVED | Noted: 2019-02-04 | Resolved: 2019-12-12

## 2019-12-12 PROCEDURE — 90471 IMMUNIZATION ADMIN: CPT | Performed by: FAMILY MEDICINE

## 2019-12-12 PROCEDURE — 90472 IMMUNIZATION ADMIN EACH ADD: CPT | Performed by: FAMILY MEDICINE

## 2019-12-12 PROCEDURE — 90632 HEPA VACCINE ADULT IM: CPT | Performed by: FAMILY MEDICINE

## 2019-12-12 PROCEDURE — 99214 OFFICE O/P EST MOD 30 MIN: CPT | Performed by: FAMILY MEDICINE

## 2019-12-12 PROCEDURE — 90715 TDAP VACCINE 7 YRS/> IM: CPT | Performed by: FAMILY MEDICINE

## 2019-12-12 NOTE — PROGRESS NOTES
Subjective     Lilliam Portillo is a 59 y.o. female, who presents with a chief complaint of   Chief Complaint   Patient presents with   • Prediabetes   • Depression       Rash   Her past medical history is significant for allergies.   Depression Patient presents with the following symptoms: insomnia.    Insomnia   Associated symptoms include headaches.   Allergies   Associated symptoms include headaches.   Diabetes   Hypoglycemia symptoms include headaches.      1. Depression.  Takes bupropion and fluoxetine. She reports her symptoms are well-controlled.    2. Insomnia.  Trazodone is helping.  Able to sleep well with this.  Hasn't been needing to use this much recently since she retired.    3. Allergic rhinitis.  Reports symptoms are controlled with nasal steroid.    4. Atrophic vaginitis.  Controlled with Premarin.    The following portions of the patient's history were reviewed and updated as appropriate: allergies, current medications, past family history, past medical history, past social history, past surgical history and problem list.    Allergies: Contrast dye    Review of Systems   Constitutional: Negative.    Eyes: Negative.    Respiratory: Negative.    Cardiovascular: Negative.    Gastrointestinal: Negative.    Endocrine: Negative.    Genitourinary: Negative.    Musculoskeletal: Positive for back pain.   Allergic/Immunologic: Positive for environmental allergies.   Neurological: Positive for headaches.   Hematological: Negative.    Psychiatric/Behavioral: Positive for sleep disturbance. The patient has insomnia.        Objective     Wt Readings from Last 3 Encounters:   12/12/19 85.3 kg (188 lb)   06/20/19 82.6 kg (182 lb)   02/13/19 83.6 kg (184 lb 6.4 oz)     Temp Readings from Last 3 Encounters:   12/12/19 98.3 °F (36.8 °C) (Oral)   06/20/19 98.1 °F (36.7 °C) (Oral)   02/14/19 97.5 °F (36.4 °C) (Oral)     BP Readings from Last 3 Encounters:   12/12/19 140/80   06/20/19 136/84   02/14/19 110/67     Pulse  Readings from Last 3 Encounters:   12/12/19 83   06/20/19 72   02/14/19 76     Body mass index is 34.39 kg/m².    Physical Exam   Constitutional: She is oriented to person, place, and time. She appears well-developed and well-nourished.   HENT:   Head: Normocephalic and atraumatic.   Mouth/Throat: Oropharynx is clear and moist.   Eyes: Conjunctivae and EOM are normal.   Neck: Neck supple. No thyromegaly present.   Cardiovascular: Normal rate, regular rhythm and normal heart sounds.   Pulmonary/Chest: Effort normal and breath sounds normal.   Abdominal: Soft. Bowel sounds are normal. There is no hepatosplenomegaly.   Musculoskeletal: Normal range of motion. She exhibits no edema.   Neurological: She is alert and oriented to person, place, and time.   Skin: Skin is warm and dry. No rash noted.   Psychiatric: She has a normal mood and affect. Her behavior is normal.   Nursing note and vitals reviewed.    Assessment/Plan   Lilliam was seen today for prediabetes and depression.    Diagnoses and all orders for this visit:    Depression, unspecified depression type  -     CBC & Differential; Future  -     TSH; Future    Insomnia, unspecified type    Allergic rhinitis, unspecified seasonality, unspecified trigger    Atrophic vaginitis    Prediabetes  -     Comprehensive Metabolic Panel; Future  -     Hemoglobin A1c; Future  -     Lipid Panel With / Chol / HDL Ratio; Future  -     Vitamin B12; Future    Pulmonary nodules    Degeneration of intervertebral disc of lumbar region    Routine health maintenance  -     CBC & Differential; Future    1. Depression.  Controlled.  Continue bupropion and fluoxetine.    2. Insomnia.  Benefiting from prn trazodone and lifestyle measures.  Continue same.    3. Allergic rhinitis. Continue nasal steroid; Omnaris works the best for her.    4. Atrophic vaginitis.  Controlled with Premarin. We discussed risks and benefits.  She is UTD on mammogram.     5. Prediabetes.  Last A1c 5.9.  Labs  ordered.  Continue lifestyle measures.      6. Pulmonary nodule.  Two years of stability documented.  Radiologist recommended no further imaging.    7. Routine health maint.  2nd dose hep A today.  Tdap today.  Doesn't need a pap smear.  Mammogram UTD.  Colonoscopy due 11/2026.  Flu vaccine at pharmacy.  Shingrix at pharmacy.    8. Chronic low back pain.  She is seeing pain management for injections.  Dr. Oneil.      Current Outpatient Medications:   •  albuterol (PROVENTIL) (2.5 MG/3ML) 0.083% nebulizer solution, Take 2.5 mg by nebulization Every 4 (Four) Hours As Needed for Wheezing or Shortness of Air., Disp: , Rfl:   •  Biotin 300 MCG tablet, Take 1 tablet by mouth Daily., Disp: , Rfl:   •  buPROPion XL (WELLBUTRIN XL) 300 MG 24 hr tablet, TAKE ONE TABLET BY MOUTH DAILY, Disp: 90 tablet, Rfl: 1  •  Cholecalciferol (VITAMIN D3) 2000 UNITS tablet, Take 1 tablet by mouth Daily., Disp: , Rfl:   •  ciclesonide (OMNARIS) 50 MCG/ACT nasal spray, 2 sprays by Each Nare route Daily. (Patient taking differently: 2 sprays by Each Nare route Daily As Needed for Allergies.), Disp: 1 each, Rfl: 11  •  clobetasol prop emollient base (TEMOVATE) 0.05 % emollient cream, Apply to affected areas twice daily, Disp: 15 g, Rfl: 0  •  dicyclomine (BENTYL) 20 MG tablet, Take 20 mg by mouth Every 6 (Six) Hours., Disp: , Rfl:   •  FLUoxetine (PROzac) 20 MG capsule, Take 1 capsule by mouth Every Night., Disp: 90 capsule, Rfl: 1  •  FLUoxetine (PROzac) 20 MG tablet, TAKE ONE TABLET BY MOUTH DAILY, Disp: 90 tablet, Rfl: 0  •  montelukast (SINGULAIR) 10 MG tablet, TAKE ONE TABLET BY MOUTH ONCE NIGHTLY, Disp: 90 tablet, Rfl: 0  •  Multiple Vitamin (MULTI VITAMIN) tablet, Take 1 tablet by mouth Daily., Disp: , Rfl:   •  naproxen (NAPROSYN) 500 MG tablet, TAKE ONE TABLET BY MOUTH TWICE A DAY WITH MEALS, Disp: 60 tablet, Rfl: 3  •  Nutritional Supplements (DHEA PO), Take 1 tablet by mouth Daily., Disp: , Rfl:   •  nystatin-triamcinolone  (MYCOLOG) 871223-5.1 UNIT/GM-% ointment, APPLY TO AFFECTED AREA(S) THREE TIMES A DAY AS DIRECTED, Disp: 60 g, Rfl: 0  •  olopatadine (PATANOL) 0.1 % ophthalmic solution, Administer 1 drop to both eyes 2 (Two) Times a Day., Disp: 5 mL, Rfl: 12  •  ondansetron (ZOFRAN) 4 MG tablet, Take 4 mg by mouth Every 8 (Eight) Hours As Needed for Nausea or Vomiting., Disp: , Rfl:   •  PREMARIN 0.625 MG tablet, TAKE ONE TABLET BY MOUTH DAILY FOR 21 DAYS * DO NOT TAKE FOR 7 DAYS, Disp: 30 tablet, Rfl: 4  •  tiZANidine (ZANAFLEX) 4 MG tablet, TAKE ONE TABLET BY MOUTH EVERY NIGHT AS NEEDED FOR MUSCLE SPASMS, Disp: 30 tablet, Rfl: 0  •  traZODone (DESYREL) 50 MG tablet, TAKE ONE TABLET BY MOUTH EVERY NIGHT AT BEDTIME AS NEEDED FOR SLEEP, Disp: 90 tablet, Rfl: 0  •  triamcinolone (KENALOG) 0.025 % cream, Apply  topically to the appropriate area as directed Daily As Needed for Rash., Disp: 15 g, Rfl: 0  There are no discontinued medications.    Return in about 6 months (around 6/12/2020).

## 2019-12-19 RX ORDER — BUPROPION HYDROCHLORIDE 300 MG/1
TABLET ORAL
Qty: 30 TABLET | Refills: 0 | Status: SHIPPED | OUTPATIENT
Start: 2019-12-19 | End: 2020-03-24

## 2019-12-20 ENCOUNTER — RESULTS ENCOUNTER (OUTPATIENT)
Dept: INTERNAL MEDICINE | Facility: CLINIC | Age: 59
End: 2019-12-20

## 2019-12-20 DIAGNOSIS — F32.A DEPRESSION, UNSPECIFIED DEPRESSION TYPE: ICD-10-CM

## 2019-12-20 DIAGNOSIS — R73.03 PREDIABETES: ICD-10-CM

## 2019-12-20 DIAGNOSIS — Z00.00 ROUTINE HEALTH MAINTENANCE: ICD-10-CM

## 2019-12-28 LAB
ALBUMIN SERPL-MCNC: 4.4 G/DL (ref 3.5–5.5)
ALBUMIN/GLOB SERPL: 1.8 {RATIO} (ref 1.2–2.2)
ALP SERPL-CCNC: 88 IU/L (ref 39–117)
ALT SERPL-CCNC: 28 IU/L (ref 0–32)
AST SERPL-CCNC: 29 IU/L (ref 0–40)
BASOPHILS # BLD AUTO: 0 X10E3/UL (ref 0–0.2)
BASOPHILS NFR BLD AUTO: 1 %
BILIRUB SERPL-MCNC: 0.5 MG/DL (ref 0–1.2)
BUN SERPL-MCNC: 9 MG/DL (ref 6–24)
BUN/CREAT SERPL: 13 (ref 9–23)
CALCIUM SERPL-MCNC: 9.7 MG/DL (ref 8.7–10.2)
CHLORIDE SERPL-SCNC: 103 MMOL/L (ref 96–106)
CHOLEST SERPL-MCNC: 233 MG/DL (ref 100–199)
CHOLEST/HDLC SERPL: 2.7 RATIO (ref 0–4.4)
CO2 SERPL-SCNC: 25 MMOL/L (ref 20–29)
CREAT SERPL-MCNC: 0.69 MG/DL (ref 0.57–1)
EOSINOPHIL # BLD AUTO: 0.1 X10E3/UL (ref 0–0.4)
EOSINOPHIL NFR BLD AUTO: 3 %
ERYTHROCYTE [DISTWIDTH] IN BLOOD BY AUTOMATED COUNT: 13.8 % (ref 12.3–15.4)
GLOBULIN SER CALC-MCNC: 2.4 G/DL (ref 1.5–4.5)
GLUCOSE SERPL-MCNC: 117 MG/DL (ref 65–99)
HBA1C MFR BLD: 6.4 % (ref 4.8–5.6)
HCT VFR BLD AUTO: 40.7 % (ref 34–46.6)
HDLC SERPL-MCNC: 85 MG/DL
HGB BLD-MCNC: 13.8 G/DL (ref 11.1–15.9)
IMM GRANULOCYTES # BLD AUTO: 0 X10E3/UL (ref 0–0.1)
IMM GRANULOCYTES NFR BLD AUTO: 0 %
LDLC SERPL CALC-MCNC: 129 MG/DL (ref 0–99)
LYMPHOCYTES # BLD AUTO: 1.7 X10E3/UL (ref 0.7–3.1)
LYMPHOCYTES NFR BLD AUTO: 42 %
MCH RBC QN AUTO: 31.4 PG (ref 26.6–33)
MCHC RBC AUTO-ENTMCNC: 33.9 G/DL (ref 31.5–35.7)
MCV RBC AUTO: 93 FL (ref 79–97)
MONOCYTES # BLD AUTO: 0.3 X10E3/UL (ref 0.1–0.9)
MONOCYTES NFR BLD AUTO: 8 %
NEUTROPHILS # BLD AUTO: 1.8 X10E3/UL (ref 1.4–7)
NEUTROPHILS NFR BLD AUTO: 46 %
PLATELET # BLD AUTO: 351 X10E3/UL (ref 150–450)
POTASSIUM SERPL-SCNC: 4.8 MMOL/L (ref 3.5–5.2)
PROT SERPL-MCNC: 6.8 G/DL (ref 6–8.5)
RBC # BLD AUTO: 4.4 X10E6/UL (ref 3.77–5.28)
SODIUM SERPL-SCNC: 141 MMOL/L (ref 134–144)
TRIGL SERPL-MCNC: 95 MG/DL (ref 0–149)
TSH SERPL DL<=0.005 MIU/L-ACNC: 2.75 UIU/ML (ref 0.45–4.5)
VIT B12 SERPL-MCNC: 525 PG/ML (ref 232–1245)
VLDLC SERPL CALC-MCNC: 19 MG/DL (ref 5–40)
WBC # BLD AUTO: 3.9 X10E3/UL (ref 3.4–10.8)

## 2020-02-25 RX ORDER — TRAZODONE HYDROCHLORIDE 50 MG/1
TABLET ORAL
Qty: 90 TABLET | Refills: 0 | Status: SHIPPED | OUTPATIENT
Start: 2020-02-25 | End: 2020-05-25

## 2020-03-11 RX ORDER — NAPROXEN 500 MG/1
TABLET ORAL
Qty: 60 TABLET | Refills: 2 | Status: SHIPPED | OUTPATIENT
Start: 2020-03-11 | End: 2020-10-15

## 2020-03-24 RX ORDER — BUPROPION HYDROCHLORIDE 300 MG/1
TABLET ORAL
Qty: 30 TABLET | Refills: 0 | Status: SHIPPED | OUTPATIENT
Start: 2020-03-24 | End: 2020-04-22

## 2020-04-09 ENCOUNTER — OFFICE VISIT (OUTPATIENT)
Dept: INTERNAL MEDICINE | Facility: CLINIC | Age: 60
End: 2020-04-09

## 2020-04-09 VITALS
RESPIRATION RATE: 16 BRPM | WEIGHT: 192.2 LBS | HEART RATE: 72 BPM | TEMPERATURE: 97.1 F | HEIGHT: 62 IN | OXYGEN SATURATION: 98 % | BODY MASS INDEX: 35.37 KG/M2 | DIASTOLIC BLOOD PRESSURE: 80 MMHG | SYSTOLIC BLOOD PRESSURE: 110 MMHG

## 2020-04-09 DIAGNOSIS — B37.31 VAGINAL CANDIDA: Primary | ICD-10-CM

## 2020-04-09 DIAGNOSIS — R82.998 LEUKOCYTES IN URINE: ICD-10-CM

## 2020-04-09 DIAGNOSIS — E11.9 TYPE 2 DIABETES MELLITUS TREATED WITHOUT INSULIN (HCC): ICD-10-CM

## 2020-04-09 DIAGNOSIS — R30.0 DYSURIA: ICD-10-CM

## 2020-04-09 DIAGNOSIS — L23.7 CONTACT DERMATITIS DUE TO POISON IVY: ICD-10-CM

## 2020-04-09 LAB
BILIRUB BLD-MCNC: NEGATIVE MG/DL
CLARITY, POC: ABNORMAL
COLOR UR: YELLOW
GLUCOSE UR STRIP-MCNC: NEGATIVE MG/DL
KETONES UR QL: NEGATIVE
LEUKOCYTE EST, POC: ABNORMAL
NITRITE UR-MCNC: NEGATIVE MG/ML
PH UR: 5.5 [PH] (ref 5–8)
PROT UR STRIP-MCNC: NEGATIVE MG/DL
RBC # UR STRIP: NEGATIVE /UL
SP GR UR: 1.02 (ref 1–1.03)
UROBILINOGEN UR QL: NORMAL

## 2020-04-09 PROCEDURE — 99214 OFFICE O/P EST MOD 30 MIN: CPT | Performed by: NURSE PRACTITIONER

## 2020-04-09 PROCEDURE — 81003 URINALYSIS AUTO W/O SCOPE: CPT | Performed by: NURSE PRACTITIONER

## 2020-04-09 RX ORDER — TRIAMCINOLONE ACETONIDE 0.25 MG/G
OINTMENT TOPICAL 2 TIMES DAILY
Qty: 80 G | Refills: 1 | Status: SHIPPED | OUTPATIENT
Start: 2020-04-09 | End: 2021-11-11 | Stop reason: SDUPTHER

## 2020-04-09 RX ORDER — FLUCONAZOLE 150 MG/1
150 TABLET ORAL ONCE
Qty: 1 TABLET | Refills: 0 | Status: SHIPPED | OUTPATIENT
Start: 2020-04-09 | End: 2020-04-09

## 2020-04-09 NOTE — PATIENT INSTRUCTIONS
"Carbohydrate Counting for Diabetes Mellitus, Adult    Carbohydrate counting is a method of keeping track of how many carbohydrates you eat. Eating carbohydrates naturally increases the amount of sugar (glucose) in the blood. Counting how many carbohydrates you eat helps keep your blood glucose within normal limits, which helps you manage your diabetes (diabetes mellitus).  It is important to know how many carbohydrates you can safely have in each meal. This is different for every person. A diet and nutrition specialist (registered dietitian) can help you make a meal plan and calculate how many carbohydrates you should have at each meal and snack.  Carbohydrates are found in the following foods:  · Grains, such as breads and cereals.  · Dried beans and soy products.  · Starchy vegetables, such as potatoes, peas, and corn.  · Fruit and fruit juices.  · Milk and yogurt.  · Sweets and snack foods, such as cake, cookies, candy, chips, and soft drinks.  How do I count carbohydrates?  There are two ways to count carbohydrates in food. You can use either of the methods or a combination of both.  Reading \"Nutrition Facts\" on packaged food  The \"Nutrition Facts\" list is included on the labels of almost all packaged foods and beverages in the [place] It includes:  · The serving size.  · Information about nutrients in each serving, including the grams (g) of carbohydrate per serving.  To use the “Nutrition Facts\":  · Decide how many servings you will have.  · Multiply the number of servings by the number of carbohydrates per serving.  · The resulting number is the total amount of carbohydrates that you will be having.  Learning standard serving sizes of other foods  When you eat carbohydrate foods that are not packaged or do not include \"Nutrition Facts\" on the label, you need to measure the servings in order to count the amount of carbohydrates:  · Measure the foods that you will eat with a food scale or measuring cup, if " needed.  · Decide how many standard-size servings you will eat.  · Multiply the number of servings by 15. Most carbohydrate-rich foods have about 15 g of carbohydrates per serving.  ? For example, if you eat 8 oz (170 g) of strawberries, you will have eaten 2 servings and 30 g of carbohydrates (2 servings x 15 g = 30 g).  · For foods that have more than one food mixed, such as soups and casseroles, you must count the carbohydrates in each food that is included.  The following list contains standard serving sizes of common carbohydrate-rich foods. Each of these servings has about 15 g of carbohydrates:  · ½ hamburger bun or ½ English muffin.  · ½ oz (15 mL) syrup.  · ½ oz (14 g) jelly.  · 1 slice of bread.  · 1 six-inch tortilla.  · 3 oz (85 g) cooked rice or pasta.  · 4 oz (113 g) cooked dried beans.  · 4 oz (113 g) starchy vegetable, such as peas, corn, or potatoes.  · 4 oz (113 g) hot cereal.  · 4 oz (113 g) mashed potatoes or ¼ of a large baked potato.  · 4 oz (113 g) canned or frozen fruit.  · 4 oz (120 mL) fruit juice.  · 4-6 crackers.  · 6 chicken nuggets.  · 6 oz (170 g) unsweetened dry cereal.  · 6 oz (170 g) plain fat-free yogurt or yogurt sweetened with artificial sweeteners.  · 8 oz (240 mL) milk.  · 8 oz (170 g) fresh fruit or one small piece of fruit.  · 24 oz (680 g) popped popcorn.  Example of carbohydrate counting  Sample meal  · 3 oz (85 g) chicken breast.  · 6 oz (170 g) brown rice.  · 4 oz (113 g) corn.  · 8 oz (240 mL) milk.  · 8 oz (170 g) strawberries with sugar-free whipped topping.  Carbohydrate calculation  1. Identify the foods that contain carbohydrates:  ? Rice.  ? Corn.  ? Milk.  ? Strawberries.  2. Calculate how many servings you have of each food:  ? 2 servings rice.  ? 1 serving corn.  ? 1 serving milk.  ? 1 serving strawberries.  3. Multiply each number of servings by 15 g:  ? 2 servings rice x 15 g = 30 g.  ? 1 serving corn x 15 g = 15 g.  ? 1 serving milk x 15 g = 15 g.  ? 1  serving strawberries x 15 g = 15 g.  4. Add together all of the amounts to find the total grams of carbohydrates eaten:  ? 30 g + 15 g + 15 g + 15 g = 75 g of carbohydrates total.  Summary  · Carbohydrate counting is a method of keeping track of how many carbohydrates you eat.  · Eating carbohydrates naturally increases the amount of sugar (glucose) in the blood.  · Counting how many carbohydrates you eat helps keep your blood glucose within normal limits, which helps you manage your diabetes.  · A diet and nutrition specialist (registered dietitian) can help you make a meal plan and calculate how many carbohydrates you should have at each meal and snack.  This information is not intended to replace advice given to you by your health care provider. Make sure you discuss any questions you have with your health care provider.  Document Released: 12/18/2006 Document Revised: 01/14/2020 Document Reviewed: 05/31/2017  [employer] Patient Education © 2020 [employer]

## 2020-04-09 NOTE — PROGRESS NOTES
Chief Complaint   Patient presents with   • Difficulty Urinating     burn & itch with urination   • Weight Gain     wants to discuss saxenda       Subjective     Lilliam Portillo is a 59 y.o. female being seen for a follow up appointment today regarding possible UTI. She began with symptoms 4 days ago.  It began with vaginal itching. Then, she developed burning with urination and frequency. Denies back pain or blood in urineShe has a total hysterectomy.     She has a history of prediabetes, fasting sugars running 106. She denies Blurred vision, urinary frequency. She is interested in weight loss medications. Her daughter takes Saxenda and  is diabetic. She is not dieting or exercising and has gained 5-10 pounds recently.     Thirdly, she is requesting Kenalog cream for poison ivy this summer. She gets it frequently and has used in the past. No current rash.       History of Present Illness     Allergies   Allergen Reactions   • Contrast Dye Rash         Current Outpatient Medications:   •  albuterol (PROVENTIL) (2.5 MG/3ML) 0.083% nebulizer solution, Take 2.5 mg by nebulization Every 4 (Four) Hours As Needed for Wheezing or Shortness of Air., Disp: , Rfl:   •  Biotin 300 MCG tablet, Take 1 tablet by mouth Daily., Disp: , Rfl:   •  buPROPion XL (WELLBUTRIN XL) 300 MG 24 hr tablet, TAKE ONE TABLET BY MOUTH DAILY, Disp: 30 tablet, Rfl: 0  •  Cholecalciferol (VITAMIN D3) 2000 UNITS tablet, Take 1 tablet by mouth Daily., Disp: , Rfl:   •  ciclesonide (OMNARIS) 50 MCG/ACT nasal spray, 2 sprays by Each Nare route Daily. (Patient taking differently: 2 sprays by Each Nare route Daily As Needed for Allergies.), Disp: 1 each, Rfl: 11  •  clobetasol prop emollient base (TEMOVATE) 0.05 % emollient cream, Apply to affected areas twice daily, Disp: 15 g, Rfl: 0  •  dicyclomine (BENTYL) 20 MG tablet, Take 20 mg by mouth Every 6 (Six) Hours., Disp: , Rfl:   •  FLUoxetine (PROzac) 20 MG capsule, Take 1 capsule by mouth Every  Night., Disp: 90 capsule, Rfl: 1  •  FLUoxetine (PROzac) 20 MG tablet, TAKE ONE TABLET BY MOUTH DAILY, Disp: 90 tablet, Rfl: 0  •  montelukast (SINGULAIR) 10 MG tablet, TAKE ONE TABLET BY MOUTH ONCE NIGHTLY, Disp: 90 tablet, Rfl: 0  •  Multiple Vitamin (MULTI VITAMIN) tablet, Take 1 tablet by mouth Daily., Disp: , Rfl:   •  naproxen (NAPROSYN) 500 MG tablet, TAKE ONE TABLET BY MOUTH TWICE A DAY WITH MEALS, Disp: 60 tablet, Rfl: 2  •  Nutritional Supplements (DHEA PO), Take 1 tablet by mouth Daily., Disp: , Rfl:   •  nystatin-triamcinolone (MYCOLOG) 489063-9.1 UNIT/GM-% ointment, APPLY TO AFFECTED AREA(S) THREE TIMES A DAY AS DIRECTED, Disp: 60 g, Rfl: 0  •  olopatadine (PATANOL) 0.1 % ophthalmic solution, Administer 1 drop to both eyes 2 (Two) Times a Day., Disp: 5 mL, Rfl: 12  •  PREMARIN 0.625 MG tablet, TAKE ONE TABLET BY MOUTH DAILY FOR 21 DAYS * DO NOT TAKE FOR 7 DAYS, Disp: 30 tablet, Rfl: 4  •  tiZANidine (ZANAFLEX) 4 MG tablet, TAKE ONE TABLET BY MOUTH EVERY NIGHT AS NEEDED FOR MUSCLE SPASMS, Disp: 30 tablet, Rfl: 0  •  traZODone (DESYREL) 50 MG tablet, TAKE ONE TABLET BY MOUTH EVERY NIGHT AT BEDTIME AS NEEDED FOR SLEEP, Disp: 90 tablet, Rfl: 0  •  triamcinolone (KENALOG) 0.025 % cream, Apply  topically to the appropriate area as directed Daily As Needed for Rash., Disp: 15 g, Rfl: 0  •  ondansetron (ZOFRAN) 4 MG tablet, Take 4 mg by mouth Every 8 (Eight) Hours As Needed for Nausea or Vomiting., Disp: , Rfl:     The following portions of the patient's history were reviewed and updated as appropriate: allergies, current medications, past family history, past medical history, past social history, past surgical history and problem list.    Review of Systems   Constitutional: Positive for unexpected weight change.   HENT: Negative.    Eyes: Negative for visual disturbance.   Gastrointestinal: Negative.    Genitourinary: Positive for dysuria and frequency. Negative for difficulty urinating, flank pain, hematuria,  urgency, vaginal bleeding and vaginal discharge.   Allergic/Immunologic: Negative.    Psychiatric/Behavioral: Positive for dysphoric mood.       Assessment     Physical Exam   Constitutional: She is oriented to person, place, and time. She appears well-developed and well-nourished.   HENT:   Head: Normocephalic.   Right Ear: External ear normal.   Left Ear: External ear normal.   Mouth/Throat: No oropharyngeal exudate.   Neck: Neck supple. No thyromegaly present.   Cardiovascular: Normal rate, regular rhythm and normal heart sounds.   Pulmonary/Chest: Effort normal and breath sounds normal. No stridor. No respiratory distress.   Abdominal: Soft. Bowel sounds are normal. She exhibits no distension. There is no tenderness.   Musculoskeletal: She exhibits no edema.   Neurological: She is alert and oriented to person, place, and time.   Skin: Skin is warm and dry.   Psychiatric: She has a normal mood and affect. Her behavior is normal.       Plan     Her fasting labs were reviewed with the patient from December 2019. Hgb A1c at that time was 6.4%.      Diagnosis Plan   1. Vaginal candida  fluconazole (Diflucan) 150 MG tablet   2. Dysuria  POCT urinalysis dipstick, automated   3. Leukocytes in urine  POCT urinalysis dipstick, automated    Urine Culture - Urine, Urine, Clean Catch    Urine Culture - Urine, Urine, Clean Catch   4. Type 2 diabetes mellitus treated without insulin (CMS/[facility])  Liraglutide (VICTOZA) 18 MG/3ML solution pen-injector injection    Insulin Pen Needle 32G X 4 MM misc   5. Contact dermatitis due to poison ivy  triamcinolone (KENALOG) 0.025 % ointment       Leuks on urine, culture sent. Treated for vaginal candida.     New onset DM 2. Low carb diet recommended and resources given. Demo complete on Victoza. She will start at 0.6mg and taper up weekly. Warned of SE of nausea and constipation. She fernando any thyroid cancer history and PE normal today. Hgb A21c is 6.9% today, in the diabetic range. Will  start Victoza for glucose and weigh control.     Call in her glucose log in 4 weeks and her weight with a video visit with Dr. QURESHI

## 2020-04-11 LAB
BACTERIA UR CULT: NORMAL
BACTERIA UR CULT: NORMAL

## 2020-04-16 DIAGNOSIS — N95.2 ATROPHIC VAGINITIS: ICD-10-CM

## 2020-04-16 RX ORDER — CONJUGATED ESTROGENS 0.62 MG/1
TABLET, FILM COATED ORAL
Qty: 30 TABLET | Refills: 3 | Status: SHIPPED | OUTPATIENT
Start: 2020-04-16 | End: 2020-07-17

## 2020-04-20 RX ORDER — FLUOXETINE HYDROCHLORIDE 20 MG/1
CAPSULE ORAL
Qty: 90 CAPSULE | Refills: 0 | Status: SHIPPED | OUTPATIENT
Start: 2020-04-20 | End: 2020-07-24

## 2020-04-22 RX ORDER — BUPROPION HYDROCHLORIDE 300 MG/1
TABLET ORAL
Qty: 30 TABLET | Refills: 5 | Status: SHIPPED | OUTPATIENT
Start: 2020-04-22 | End: 2020-12-22

## 2020-04-27 ENCOUNTER — OFFICE VISIT (OUTPATIENT)
Dept: INTERNAL MEDICINE | Facility: CLINIC | Age: 60
End: 2020-04-27

## 2020-04-27 ENCOUNTER — TELEPHONE (OUTPATIENT)
Dept: INTERNAL MEDICINE | Facility: CLINIC | Age: 60
End: 2020-04-27

## 2020-04-27 VITALS
OXYGEN SATURATION: 98 % | TEMPERATURE: 97.8 F | HEIGHT: 62 IN | RESPIRATION RATE: 16 BRPM | WEIGHT: 197 LBS | SYSTOLIC BLOOD PRESSURE: 136 MMHG | DIASTOLIC BLOOD PRESSURE: 74 MMHG | HEART RATE: 82 BPM | BODY MASS INDEX: 36.25 KG/M2

## 2020-04-27 DIAGNOSIS — E11.9 TYPE 2 DIABETES MELLITUS TREATED WITHOUT INSULIN (HCC): Primary | ICD-10-CM

## 2020-04-27 PROCEDURE — 99214 OFFICE O/P EST MOD 30 MIN: CPT | Performed by: FAMILY MEDICINE

## 2020-04-27 RX ORDER — SYRINGE AND NEEDLE,INSULIN,1ML 30 G X1/2"
SYRINGE, EMPTY DISPOSABLE MISCELLANEOUS
COMMUNITY
Start: 2020-04-07

## 2020-04-27 RX ORDER — EPINEPHRINE 0.3 MG/.3ML
INJECTION SUBCUTANEOUS
COMMUNITY
Start: 2020-02-12

## 2020-04-27 RX ORDER — GABAPENTIN 600 MG/1
600 TABLET, FILM COATED ORAL DAILY
COMMUNITY
Start: 2020-04-21 | End: 2022-03-14

## 2020-04-27 RX ORDER — MONTELUKAST SODIUM 10 MG/1
10 TABLET ORAL NIGHTLY
Qty: 90 TABLET | Refills: 0 | Status: SHIPPED | OUTPATIENT
Start: 2020-04-27 | End: 2020-07-24

## 2020-04-27 RX ORDER — METFORMIN HYDROCHLORIDE 500 MG/1
500 TABLET, EXTENDED RELEASE ORAL
Qty: 30 TABLET | Refills: 6 | Status: SHIPPED | OUTPATIENT
Start: 2020-04-27 | End: 2021-03-04

## 2020-04-27 NOTE — PROGRESS NOTES
Lilliam Portillo is a 59 y.o. female, who presents with a chief complaint of   Chief Complaint   Patient presents with   • Follow-up   • Diabetes     new dx 4/2020   • Weight Loss       HPI     Pt here to f/u on diabetes.  She had previously been diagnosed with prediabetes and was treating this with lifestyle measures.  She saw Lesli on 4/9/2020 and had vaginal candidiasis and POC A1c was 6.9.  Lesli prescribed Victoza, which was not approved by insurance.  She has been working on lifestyle measures.  Fasting blood sugars at home are running 117-130.  She is feeling well.    The following portions of the patient's history were reviewed and updated as appropriate: allergies, current medications, past family history, past medical history, past social history, past surgical history and problem list.    Allergies: Adhesive tape and Contrast dye    Review of Systems   Constitutional: Positive for fatigue.   Eyes: Negative for visual disturbance.   Respiratory: Negative.    Cardiovascular: Negative.    Endocrine: Negative for polydipsia and polyuria.   Genitourinary: Negative.  Negative for dysuria.   Skin: Negative.    Allergic/Immunologic: Positive for environmental allergies.   Neurological: Positive for headaches.   Hematological: Negative.    Psychiatric/Behavioral: Negative for sleep disturbance.             Wt Readings from Last 3 Encounters:   04/27/20 89.4 kg (197 lb)   04/09/20 87.2 kg (192 lb 3.2 oz)   12/12/19 85.3 kg (188 lb)     Temp Readings from Last 3 Encounters:   04/27/20 97.8 °F (36.6 °C) (Temporal)   04/09/20 97.1 °F (36.2 °C) (Temporal)   12/12/19 98.3 °F (36.8 °C) (Oral)     BP Readings from Last 3 Encounters:   04/27/20 136/74   04/09/20 110/80   12/12/19 140/80     Pulse Readings from Last 3 Encounters:   04/27/20 82   04/09/20 72   12/12/19 83     Body mass index is 36.03 kg/m².  @LASTSAO2(3)@    Physical Exam   Constitutional: She is oriented to person, place, and time. She appears  "well-developed and well-nourished.   HENT:   Head: Normocephalic and atraumatic.   Eyes: Conjunctivae and EOM are normal.   Neck: Neck supple. No thyromegaly present.   Cardiovascular: Normal rate, regular rhythm and normal heart sounds.   Pulmonary/Chest: Effort normal and breath sounds normal. No respiratory distress.   Musculoskeletal: Normal range of motion. She exhibits no edema.   Neurological: She is alert and oriented to person, place, and time.   Skin: Skin is warm and dry.   Psychiatric: She has a normal mood and affect. Her behavior is normal.   Nursing note and vitals reviewed.          Lilliam was seen today for follow-up, diabetes and weight loss.    Diagnoses and all orders for this visit:    Type 2 diabetes mellitus treated without insulin (CMS/[facility])  -     metFORMIN ER (GLUCOPHAGE-XR) 500 MG 24 hr tablet; Take 1 tablet by mouth Daily With Breakfast.  -     CBC & Differential; Future  -     Comprehensive Metabolic Panel; Future  -     Hemoglobin A1c; Future  -     Microalbumin / Creatinine Urine Ratio - Urine, Clean Catch; Future  -     Lipid Panel With / Chol / HDL Ratio; Future  -     TSH; Future  -     Vitamin B12; Future  -     Vitamin D 25 Hydroxy; Future    Other orders  -     montelukast (SINGULAIR) 10 MG tablet; Take 1 tablet by mouth Every Night.      Start metformin.  Lifestyle measures discussed.  F/U 3 months.    Outpatient Medications Prior to Visit   Medication Sig Dispense Refill   • albuterol (PROVENTIL) (2.5 MG/3ML) 0.083% nebulizer solution Take 2.5 mg by nebulization Every 4 (Four) Hours As Needed for Wheezing or Shortness of Air.     • ALLERGY SERUM INJECTION Inject  under the skin into the appropriate area as directed 2 (Two) Times a Week.     • B-D INS SYR MICROFINE 1CC/28G 28G X 1/2\" 1 ML misc      • Biotin 300 MCG tablet Take 1 tablet by mouth Daily.     • buPROPion XL (WELLBUTRIN XL) 300 MG 24 hr tablet TAKE ONE TABLET BY MOUTH DAILY 30 tablet 5   • Cholecalciferol (VITAMIN D3) " 2000 UNITS tablet Take 1 tablet by mouth Daily.     • ciclesonide (OMNARIS) 50 MCG/ACT nasal spray 2 sprays by Each Nare route Daily. (Patient taking differently: 2 sprays by Each Nare route Daily As Needed for Allergies.) 1 each 11   • clobetasol prop emollient base (TEMOVATE) 0.05 % emollient cream Apply to affected areas twice daily 15 g 0   • dicyclomine (BENTYL) 20 MG tablet Take 20 mg by mouth Every 6 (Six) Hours.     • EPINEPHrine (EPIPEN) 0.3 MG/0.3ML solution auto-injector injection      • FLUoxetine (PROzac) 20 MG capsule TAKE ONE CAPSULE BY MOUTH EVERY NIGHT 90 capsule 0   • GRALISE 600 MG tablet tablet Take 600 mg by mouth Daily.     • Insulin Pen Needle 32G X 4 MM misc 100 each Daily. For use with Victoza pen 100 each 3   • Liraglutide (VICTOZA) 18 MG/3ML solution pen-injector injection Inject 1.8 mg under the skin into the appropriate area as directed Daily With Dinner. 3 mL 3   • Multiple Vitamin (MULTI VITAMIN) tablet Take 1 tablet by mouth Daily.     • naproxen (NAPROSYN) 500 MG tablet TAKE ONE TABLET BY MOUTH TWICE A DAY WITH MEALS 60 tablet 2   • Nutritional Supplements (DHEA PO) Take 1 tablet by mouth Daily.     • PREMARIN 0.625 MG tablet TAKE ONE TABLET BY MOUTH DAILY FOR 21 DAYS * DO NOT TAKE FOR 7 DAYS 30 tablet 3   • tiZANidine (ZANAFLEX) 4 MG tablet TAKE ONE TABLET BY MOUTH EVERY NIGHT AS NEEDED FOR MUSCLE SPASMS 30 tablet 0   • traZODone (DESYREL) 50 MG tablet TAKE ONE TABLET BY MOUTH EVERY NIGHT AT BEDTIME AS NEEDED FOR SLEEP 90 tablet 0   • triamcinolone (KENALOG) 0.025 % ointment Apply  topically to the appropriate area as directed 2 (Two) Times a Day. 80 g 1   • montelukast (SINGULAIR) 10 MG tablet TAKE ONE TABLET BY MOUTH ONCE NIGHTLY 90 tablet 0     No facility-administered medications prior to visit.      New Medications Ordered This Visit   Medications   • montelukast (SINGULAIR) 10 MG tablet     Sig: Take 1 tablet by mouth Every Night.     Dispense:  90 tablet     Refill:  0   •  metFORMIN ER (GLUCOPHAGE-XR) 500 MG 24 hr tablet     Sig: Take 1 tablet by mouth Daily With Breakfast.     Dispense:  30 tablet     Refill:  6     [unfilled]  Medications Discontinued During This Encounter   Medication Reason   • montelukast (SINGULAIR) 10 MG tablet Reorder         Return in about 3 months (around 7/27/2020).

## 2020-04-27 NOTE — TELEPHONE ENCOUNTER
----- Message from Lilliam Portillo sent at 4/24/2020 11:14 AM EDT -----  Regarding: Prescription Question  Contact: [phone]  What is the status of insurance approval for my diabetes medication?

## 2020-05-25 RX ORDER — TRAZODONE HYDROCHLORIDE 50 MG/1
TABLET ORAL
Qty: 90 TABLET | Refills: 0 | Status: SHIPPED | OUTPATIENT
Start: 2020-05-25 | End: 2020-08-06

## 2020-07-17 DIAGNOSIS — N95.2 ATROPHIC VAGINITIS: ICD-10-CM

## 2020-07-17 RX ORDER — CONJUGATED ESTROGENS 0.62 MG/1
TABLET, FILM COATED ORAL
Qty: 21 TABLET | Refills: 0 | Status: SHIPPED | OUTPATIENT
Start: 2020-07-17 | End: 2020-08-08

## 2020-07-24 RX ORDER — FLUOXETINE HYDROCHLORIDE 20 MG/1
CAPSULE ORAL
Qty: 90 CAPSULE | Refills: 0 | Status: SHIPPED | OUTPATIENT
Start: 2020-07-24 | End: 2020-07-30

## 2020-07-24 RX ORDER — MONTELUKAST SODIUM 10 MG/1
TABLET ORAL
Qty: 90 TABLET | Refills: 0 | Status: SHIPPED | OUTPATIENT
Start: 2020-07-24 | End: 2020-07-30

## 2020-07-27 ENCOUNTER — OFFICE VISIT (OUTPATIENT)
Dept: INTERNAL MEDICINE | Facility: CLINIC | Age: 60
End: 2020-07-27

## 2020-07-27 VITALS
HEART RATE: 64 BPM | SYSTOLIC BLOOD PRESSURE: 130 MMHG | DIASTOLIC BLOOD PRESSURE: 74 MMHG | WEIGHT: 192 LBS | TEMPERATURE: 97.5 F | OXYGEN SATURATION: 96 % | RESPIRATION RATE: 16 BRPM | BODY MASS INDEX: 35.33 KG/M2 | HEIGHT: 62 IN

## 2020-07-27 DIAGNOSIS — F32.A DEPRESSION, UNSPECIFIED DEPRESSION TYPE: Primary | ICD-10-CM

## 2020-07-27 DIAGNOSIS — Z00.00 ROUTINE HEALTH MAINTENANCE: ICD-10-CM

## 2020-07-27 DIAGNOSIS — M51.36 DEGENERATION OF INTERVERTEBRAL DISC OF LUMBAR REGION: ICD-10-CM

## 2020-07-27 DIAGNOSIS — J30.9 ALLERGIC RHINITIS, UNSPECIFIED SEASONALITY, UNSPECIFIED TRIGGER: ICD-10-CM

## 2020-07-27 DIAGNOSIS — E11.9 TYPE 2 DIABETES MELLITUS TREATED WITHOUT INSULIN (HCC): ICD-10-CM

## 2020-07-27 DIAGNOSIS — N95.2 ATROPHIC VAGINITIS: ICD-10-CM

## 2020-07-27 DIAGNOSIS — G47.00 INSOMNIA, UNSPECIFIED TYPE: ICD-10-CM

## 2020-07-27 PROBLEM — B37.31 VAGINAL CANDIDA: Status: RESOLVED | Noted: 2020-04-09 | Resolved: 2020-07-27

## 2020-07-27 PROBLEM — R82.998 LEUKOCYTES IN URINE: Status: RESOLVED | Noted: 2020-04-09 | Resolved: 2020-07-27

## 2020-07-27 PROBLEM — R30.0 DYSURIA: Status: RESOLVED | Noted: 2020-04-09 | Resolved: 2020-07-27

## 2020-07-27 PROCEDURE — 99214 OFFICE O/P EST MOD 30 MIN: CPT | Performed by: FAMILY MEDICINE

## 2020-07-27 NOTE — PROGRESS NOTES
Subjective     Lilliam Portillo is a 59 y.o. female, who presents with a chief complaint of   Chief Complaint   Patient presents with   • Diabetes   • Depression       Depression Patient presents with the following symptoms: insomnia.    Rash   Her past medical history is significant for allergies.   Insomnia   Associated symptoms include headaches.   Allergies   Associated symptoms include headaches.   Diabetes   Hypoglycemia symptoms include headaches.      1. Depression.  Takes bupropion and fluoxetine. She reports her symptoms are adequately controlled.    2. Insomnia.  Trazodone is helping.  Able to sleep well with this.  Hasn't been needing to use this much recently since she retired.    3. Allergic rhinitis.  Reports symptoms are controlled with nasal steroid.    4. Atrophic vaginitis.  Controlled with Premarin.    5. Diabetes.  She is tolerating metformin okay.  Fasting blood sugars are running 115-135.    The following portions of the patient's history were reviewed and updated as appropriate: allergies, current medications, past family history, past medical history, past social history, past surgical history and problem list.    Allergies: Adhesive tape and Contrast dye    Review of Systems   Constitutional: Negative.    Eyes: Negative.    Respiratory: Negative.    Cardiovascular: Negative.    Gastrointestinal: Negative.    Endocrine: Negative.    Genitourinary: Negative.    Musculoskeletal: Positive for back pain.   Skin: Negative.    Allergic/Immunologic: Positive for environmental allergies.   Neurological: Positive for headaches.   Hematological: Negative.    Psychiatric/Behavioral: Positive for sleep disturbance. The patient has insomnia.        Objective     Wt Readings from Last 3 Encounters:   07/27/20 87.1 kg (192 lb)   04/27/20 89.4 kg (197 lb)   04/09/20 87.2 kg (192 lb 3.2 oz)     Temp Readings from Last 3 Encounters:   07/27/20 97.5 °F (36.4 °C)   04/27/20 97.8 °F (36.6 °C) (Temporal)   04/09/20  97.1 °F (36.2 °C) (Temporal)     BP Readings from Last 3 Encounters:   07/27/20 130/74   04/27/20 136/74   04/09/20 110/80     Pulse Readings from Last 3 Encounters:   07/27/20 64   04/27/20 82   04/09/20 72     Body mass index is 35.12 kg/m².    Physical Exam   Constitutional: She is oriented to person, place, and time. She appears well-developed and well-nourished.   HENT:   Head: Normocephalic and atraumatic.   Mouth/Throat: Oropharynx is clear and moist.   Eyes: Conjunctivae and EOM are normal.   Neck: Neck supple. No thyromegaly present.   Cardiovascular: Normal rate, regular rhythm and normal heart sounds.   Pulmonary/Chest: Effort normal and breath sounds normal.   Abdominal: Soft. Bowel sounds are normal. There is no hepatosplenomegaly.   Musculoskeletal: Normal range of motion. She exhibits no edema.   Neurological: She is alert and oriented to person, place, and time.   Skin: Skin is warm and dry. No rash noted.   Psychiatric: She has a normal mood and affect. Her behavior is normal.   Nursing note and vitals reviewed.    Assessment/Plan   Lilliam was seen today for diabetes and depression.    Diagnoses and all orders for this visit:    Depression, unspecified depression type  -     CBC & Differential  -     TSH    Insomnia, unspecified type    Allergic rhinitis, unspecified seasonality, unspecified trigger    Atrophic vaginitis    Type 2 diabetes mellitus treated without insulin (CMS/[facility])  -     Comprehensive Metabolic Panel  -     Hemoglobin A1c  -     Microalbumin / Creatinine Urine Ratio - Urine, Clean Catch  -     Lipid Panel With / Chol / HDL Ratio  -     Vitamin B12    Degeneration of intervertebral disc of lumbar region    Routine health maintenance  -     Mammo Screening Bilateral With CAD; Future  -     Vitamin D 25 Hydroxy    1. Depression.  Controlled.  Continue bupropion and fluoxetine.    2. Insomnia.  Benefiting from prn trazodone and lifestyle measures.  Continue same.    3. Allergic  "rhinitis. Continue nasal steroid.    4. Atrophic vaginitis.  Controlled with Premarin. We discussed risks and benefits.  She is UTD on mammogram.     5. Diabetes.  New diagnosis last time.  Continue metformin.  Check A1c today.    6. Chronic low back pain.  She is seeing pain management for injections.  Dr. Oneil.    7. Routine health maint. Tdap UTD.  Doesn't need a pap smear.  Mammogram ordered.  Colonoscopy due 11/2026.  Shingrix done at pharmacy.      Current Outpatient Medications:   •  albuterol (PROVENTIL) (2.5 MG/3ML) 0.083% nebulizer solution, Take 2.5 mg by nebulization Every 4 (Four) Hours As Needed for Wheezing or Shortness of Air., Disp: , Rfl:   •  ALLERGY SERUM INJECTION, Inject  under the skin into the appropriate area as directed 2 (Two) Times a Week., Disp: , Rfl:   •  B-D INS SYR MICROFINE 1CC/28G 28G X 1/2\" 1 ML misc, , Disp: , Rfl:   •  Biotin 300 MCG tablet, Take 1 tablet by mouth Daily., Disp: , Rfl:   •  buPROPion XL (WELLBUTRIN XL) 300 MG 24 hr tablet, TAKE ONE TABLET BY MOUTH DAILY, Disp: 30 tablet, Rfl: 5  •  Cholecalciferol (VITAMIN D3) 2000 UNITS tablet, Take 1 tablet by mouth Daily., Disp: , Rfl:   •  clobetasol prop emollient base (TEMOVATE) 0.05 % emollient cream, Apply to affected areas twice daily, Disp: 15 g, Rfl: 0  •  dicyclomine (BENTYL) 20 MG tablet, Take 20 mg by mouth Every 6 (Six) Hours., Disp: , Rfl:   •  EPINEPHrine (EPIPEN) 0.3 MG/0.3ML solution auto-injector injection, , Disp: , Rfl:   •  FLUoxetine (PROzac) 20 MG capsule, TAKE ONE CAPSULE BY MOUTH EVERY NIGHT, Disp: 90 capsule, Rfl: 0  •  GRALISE 600 MG tablet tablet, Take 600 mg by mouth Daily., Disp: , Rfl:   •  Insulin Pen Needle 32G X 4 MM misc, 100 each Daily. For use with Victoza pen, Disp: 100 each, Rfl: 3  •  metFORMIN ER (GLUCOPHAGE-XR) 500 MG 24 hr tablet, Take 1 tablet by mouth Daily With Breakfast., Disp: 30 tablet, Rfl: 6  •  montelukast (SINGULAIR) 10 MG tablet, TAKE ONE TABLET BY MOUTH ONCE NIGHTLY, " Disp: 90 tablet, Rfl: 0  •  Multiple Vitamin (MULTI VITAMIN) tablet, Take 1 tablet by mouth Daily., Disp: , Rfl:   •  naproxen (NAPROSYN) 500 MG tablet, TAKE ONE TABLET BY MOUTH TWICE A DAY WITH MEALS, Disp: 60 tablet, Rfl: 2  •  PREMARIN 0.625 MG tablet, TAKE ONE TABLET BY MOUTH DAILY FOR 21 DAYS * DO NOT TAKE FOR 7 DAYS, Disp: 21 tablet, Rfl: 0  •  tiZANidine (ZANAFLEX) 4 MG tablet, TAKE ONE TABLET BY MOUTH EVERY NIGHT AS NEEDED FOR MUSCLE SPASMS, Disp: 30 tablet, Rfl: 0  •  traZODone (DESYREL) 50 MG tablet, TAKE ONE TABLET BY MOUTH EVERY NIGHT AT BEDTIME AS NEEDED FOR SLEEP, Disp: 90 tablet, Rfl: 0  •  triamcinolone (KENALOG) 0.025 % ointment, Apply  topically to the appropriate area as directed 2 (Two) Times a Day., Disp: 80 g, Rfl: 1  •  Nutritional Supplements (DHEA PO), Take 1 tablet by mouth Daily., Disp: , Rfl:   Medications Discontinued During This Encounter   Medication Reason   • ciclesonide (OMNARIS) 50 MCG/ACT nasal spray Duplicate order   • Liraglutide (VICTOZA) 18 MG/3ML solution pen-injector injection Duplicate order       Return in about 6 months (around 1/27/2021).

## 2020-07-28 LAB
25(OH)D3+25(OH)D2 SERPL-MCNC: 40.5 NG/ML (ref 30–100)
ALBUMIN SERPL-MCNC: 4.3 G/DL (ref 3.8–4.9)
ALBUMIN/GLOB SERPL: 1.9 {RATIO} (ref 1.2–2.2)
ALP SERPL-CCNC: 89 IU/L (ref 39–117)
ALT SERPL-CCNC: 37 IU/L (ref 0–32)
AST SERPL-CCNC: 33 IU/L (ref 0–40)
BASOPHILS # BLD AUTO: 0.1 X10E3/UL (ref 0–0.2)
BASOPHILS NFR BLD AUTO: 1 %
BILIRUB SERPL-MCNC: 0.2 MG/DL (ref 0–1.2)
BUN SERPL-MCNC: 9 MG/DL (ref 6–24)
BUN/CREAT SERPL: 12 (ref 9–23)
CALCIUM SERPL-MCNC: 9.7 MG/DL (ref 8.7–10.2)
CHLORIDE SERPL-SCNC: 101 MMOL/L (ref 96–106)
CHOLEST SERPL-MCNC: 202 MG/DL (ref 100–199)
CHOLEST/HDLC SERPL: 2.8 RATIO (ref 0–4.4)
CO2 SERPL-SCNC: 27 MMOL/L (ref 20–29)
CREAT SERPL-MCNC: 0.77 MG/DL (ref 0.57–1)
EOSINOPHIL # BLD AUTO: 0.1 X10E3/UL (ref 0–0.4)
EOSINOPHIL NFR BLD AUTO: 1 %
ERYTHROCYTE [DISTWIDTH] IN BLOOD BY AUTOMATED COUNT: 12.9 % (ref 11.7–15.4)
GLOBULIN SER CALC-MCNC: 2.3 G/DL (ref 1.5–4.5)
GLUCOSE SERPL-MCNC: 99 MG/DL (ref 65–99)
HBA1C MFR BLD: 6.6 % (ref 4.8–5.6)
HCT VFR BLD AUTO: 42.9 % (ref 34–46.6)
HDLC SERPL-MCNC: 73 MG/DL
HGB BLD-MCNC: 13.9 G/DL (ref 11.1–15.9)
IMM GRANULOCYTES # BLD AUTO: 0 X10E3/UL (ref 0–0.1)
IMM GRANULOCYTES NFR BLD AUTO: 0 %
LDLC SERPL CALC-MCNC: 105 MG/DL (ref 0–99)
LYMPHOCYTES # BLD AUTO: 2.6 X10E3/UL (ref 0.7–3.1)
LYMPHOCYTES NFR BLD AUTO: 37 %
MCH RBC QN AUTO: 30.7 PG (ref 26.6–33)
MCHC RBC AUTO-ENTMCNC: 32.4 G/DL (ref 31.5–35.7)
MCV RBC AUTO: 95 FL (ref 79–97)
MONOCYTES # BLD AUTO: 0.6 X10E3/UL (ref 0.1–0.9)
MONOCYTES NFR BLD AUTO: 9 %
NEUTROPHILS # BLD AUTO: 3.6 X10E3/UL (ref 1.4–7)
NEUTROPHILS NFR BLD AUTO: 52 %
PLATELET # BLD AUTO: 372 X10E3/UL (ref 150–450)
POTASSIUM SERPL-SCNC: 4.6 MMOL/L (ref 3.5–5.2)
PROT SERPL-MCNC: 6.6 G/DL (ref 6–8.5)
RBC # BLD AUTO: 4.53 X10E6/UL (ref 3.77–5.28)
SODIUM SERPL-SCNC: 141 MMOL/L (ref 134–144)
TRIGL SERPL-MCNC: 118 MG/DL (ref 0–149)
TSH SERPL DL<=0.005 MIU/L-ACNC: 3.51 UIU/ML (ref 0.45–4.5)
VIT B12 SERPL-MCNC: 662 PG/ML (ref 232–1245)
VLDLC SERPL CALC-MCNC: 24 MG/DL (ref 5–40)
WBC # BLD AUTO: 6.9 X10E3/UL (ref 3.4–10.8)

## 2020-07-30 RX ORDER — MONTELUKAST SODIUM 10 MG/1
TABLET ORAL
Qty: 90 TABLET | Refills: 1 | Status: SHIPPED | OUTPATIENT
Start: 2020-07-30 | End: 2021-08-03

## 2020-07-30 RX ORDER — FLUOXETINE HYDROCHLORIDE 20 MG/1
CAPSULE ORAL
Qty: 90 CAPSULE | Refills: 1 | Status: SHIPPED | OUTPATIENT
Start: 2020-07-30 | End: 2021-04-29

## 2020-08-06 RX ORDER — TRAZODONE HYDROCHLORIDE 50 MG/1
TABLET ORAL
Qty: 90 TABLET | Refills: 0 | Status: SHIPPED | OUTPATIENT
Start: 2020-08-06 | End: 2020-08-24

## 2020-08-07 DIAGNOSIS — N95.2 ATROPHIC VAGINITIS: ICD-10-CM

## 2020-08-08 RX ORDER — CONJUGATED ESTROGENS 0.62 MG/1
TABLET, FILM COATED ORAL
Qty: 21 TABLET | Refills: 0 | Status: SHIPPED | OUTPATIENT
Start: 2020-08-08 | End: 2020-08-17

## 2020-08-14 DIAGNOSIS — N95.2 ATROPHIC VAGINITIS: ICD-10-CM

## 2020-08-17 RX ORDER — CONJUGATED ESTROGENS 0.62 MG/1
TABLET, FILM COATED ORAL
Qty: 21 TABLET | Refills: 0 | Status: SHIPPED | OUTPATIENT
Start: 2020-08-17 | End: 2020-09-16

## 2020-08-24 RX ORDER — TRAZODONE HYDROCHLORIDE 50 MG/1
TABLET ORAL
Qty: 90 TABLET | Refills: 1 | Status: SHIPPED | OUTPATIENT
Start: 2020-08-24 | End: 2021-05-12

## 2020-09-01 ENCOUNTER — APPOINTMENT (OUTPATIENT)
Dept: MAMMOGRAPHY | Facility: HOSPITAL | Age: 60
End: 2020-09-01

## 2020-09-15 DIAGNOSIS — N95.2 ATROPHIC VAGINITIS: ICD-10-CM

## 2020-09-16 RX ORDER — CONJUGATED ESTROGENS 0.62 MG/1
TABLET, FILM COATED ORAL
Qty: 21 TABLET | Refills: 0 | Status: SHIPPED | OUTPATIENT
Start: 2020-09-16 | End: 2020-10-15

## 2020-09-24 ENCOUNTER — TELEPHONE (OUTPATIENT)
Dept: INTERNAL MEDICINE | Facility: CLINIC | Age: 60
End: 2020-09-24

## 2020-09-24 DIAGNOSIS — H81.09 MENIERE'S DISEASE, UNSPECIFIED LATERALITY: Primary | ICD-10-CM

## 2020-09-24 NOTE — TELEPHONE ENCOUNTER
PT CALLS WANTS REFERRAL FOR ENT DR KELLY AT  [phone]. DX PER PT MENIERS DX PER PT THEY NEED REFERRAL, THANKS

## 2020-10-02 ENCOUNTER — TRANSCRIBE ORDERS (OUTPATIENT)
Dept: ADMINISTRATIVE | Facility: HOSPITAL | Age: 60
End: 2020-10-02

## 2020-10-02 DIAGNOSIS — Z01.818 OTHER SPECIFIED PRE-OPERATIVE EXAMINATION: Primary | ICD-10-CM

## 2020-10-15 DIAGNOSIS — N95.2 ATROPHIC VAGINITIS: ICD-10-CM

## 2020-10-15 RX ORDER — CONJUGATED ESTROGENS 0.62 MG/1
TABLET, FILM COATED ORAL
Qty: 21 TABLET | Refills: 0 | Status: SHIPPED | OUTPATIENT
Start: 2020-10-15 | End: 2021-04-19

## 2020-10-15 RX ORDER — NAPROXEN 500 MG/1
TABLET ORAL
Qty: 60 TABLET | Refills: 1 | Status: SHIPPED | OUTPATIENT
Start: 2020-10-15 | End: 2021-09-09

## 2020-12-12 ENCOUNTER — RESULTS ENCOUNTER (OUTPATIENT)
Dept: INTERNAL MEDICINE | Facility: CLINIC | Age: 60
End: 2020-12-12

## 2020-12-12 DIAGNOSIS — R73.03 PREDIABETES: ICD-10-CM

## 2020-12-12 DIAGNOSIS — Z00.00 ROUTINE HEALTH MAINTENANCE: ICD-10-CM

## 2020-12-12 DIAGNOSIS — F32.A DEPRESSION, UNSPECIFIED DEPRESSION TYPE: ICD-10-CM

## 2020-12-22 RX ORDER — BUPROPION HYDROCHLORIDE 300 MG/1
TABLET ORAL
Qty: 30 TABLET | Refills: 4 | Status: SHIPPED | OUTPATIENT
Start: 2020-12-22 | End: 2021-06-30

## 2021-01-30 LAB
ALBUMIN SERPL-MCNC: 4.3 G/DL (ref 3.8–4.9)
ALBUMIN/GLOB SERPL: 1.5 {RATIO} (ref 1.2–2.2)
ALP SERPL-CCNC: 122 IU/L (ref 39–117)
ALT SERPL-CCNC: 31 IU/L (ref 0–32)
AST SERPL-CCNC: 36 IU/L (ref 0–40)
BASOPHILS # BLD AUTO: 0.1 X10E3/UL (ref 0–0.2)
BASOPHILS NFR BLD AUTO: 1 %
BILIRUB SERPL-MCNC: 0.4 MG/DL (ref 0–1.2)
BUN SERPL-MCNC: 12 MG/DL (ref 6–24)
BUN/CREAT SERPL: 14 (ref 9–23)
CALCIUM SERPL-MCNC: 10.1 MG/DL (ref 8.7–10.2)
CHLORIDE SERPL-SCNC: 100 MMOL/L (ref 96–106)
CHOLEST SERPL-MCNC: 227 MG/DL (ref 100–199)
CHOLEST/HDLC SERPL: 2.5 RATIO (ref 0–4.4)
CO2 SERPL-SCNC: 25 MMOL/L (ref 20–29)
CREAT SERPL-MCNC: 0.85 MG/DL (ref 0.57–1)
EOSINOPHIL # BLD AUTO: 0.1 X10E3/UL (ref 0–0.4)
EOSINOPHIL NFR BLD AUTO: 3 %
ERYTHROCYTE [DISTWIDTH] IN BLOOD BY AUTOMATED COUNT: 13 % (ref 11.7–15.4)
GLOBULIN SER CALC-MCNC: 2.9 G/DL (ref 1.5–4.5)
GLUCOSE SERPL-MCNC: 105 MG/DL (ref 65–99)
HBA1C MFR BLD: 5.4 % (ref 4.8–5.6)
HCT VFR BLD AUTO: 40.2 % (ref 34–46.6)
HDLC SERPL-MCNC: 92 MG/DL
HGB BLD-MCNC: 12.8 G/DL (ref 11.1–15.9)
IMM GRANULOCYTES # BLD AUTO: 0 X10E3/UL (ref 0–0.1)
IMM GRANULOCYTES NFR BLD AUTO: 0 %
LDLC SERPL CALC-MCNC: 116 MG/DL (ref 0–99)
LYMPHOCYTES # BLD AUTO: 1.4 X10E3/UL (ref 0.7–3.1)
LYMPHOCYTES NFR BLD AUTO: 27 %
MCH RBC QN AUTO: 29.4 PG (ref 26.6–33)
MCHC RBC AUTO-ENTMCNC: 31.8 G/DL (ref 31.5–35.7)
MCV RBC AUTO: 92 FL (ref 79–97)
MONOCYTES # BLD AUTO: 0.6 X10E3/UL (ref 0.1–0.9)
MONOCYTES NFR BLD AUTO: 12 %
NEUTROPHILS # BLD AUTO: 2.9 X10E3/UL (ref 1.4–7)
NEUTROPHILS NFR BLD AUTO: 57 %
PLATELET # BLD AUTO: 307 X10E3/UL (ref 150–450)
POTASSIUM SERPL-SCNC: 4.4 MMOL/L (ref 3.5–5.2)
PROT SERPL-MCNC: 7.2 G/DL (ref 6–8.5)
RBC # BLD AUTO: 4.35 X10E6/UL (ref 3.77–5.28)
SODIUM SERPL-SCNC: 142 MMOL/L (ref 134–144)
TRIGL SERPL-MCNC: 113 MG/DL (ref 0–149)
TSH SERPL DL<=0.005 MIU/L-ACNC: 3.21 UIU/ML (ref 0.45–4.5)
VIT B12 SERPL-MCNC: 508 PG/ML (ref 232–1245)
VLDLC SERPL CALC-MCNC: 19 MG/DL (ref 5–40)
WBC # BLD AUTO: 5 X10E3/UL (ref 3.4–10.8)

## 2021-02-02 DIAGNOSIS — E11.9 TYPE 2 DIABETES MELLITUS TREATED WITHOUT INSULIN (HCC): ICD-10-CM

## 2021-02-03 RX ORDER — LIRAGLUTIDE 6 MG/ML
INJECTION SUBCUTANEOUS
Qty: 5 PEN | Refills: 5 | Status: SHIPPED | OUTPATIENT
Start: 2021-02-03 | End: 2021-11-11 | Stop reason: SDUPTHER

## 2021-02-11 ENCOUNTER — TELEPHONE (OUTPATIENT)
Dept: INTERNAL MEDICINE | Facility: CLINIC | Age: 61
End: 2021-02-11

## 2021-02-11 DIAGNOSIS — I97.88 HYPOXEMIA DURING SURGERY: Primary | ICD-10-CM

## 2021-02-11 DIAGNOSIS — R09.02 HYPOXEMIA DURING SURGERY: Primary | ICD-10-CM

## 2021-02-11 NOTE — TELEPHONE ENCOUNTER
PT CALLS WANTS REFERRAL TO SLEEP CENTER. SHE SAID SHE WANTS TO SEE [facility] --PER PT SHE WANTS TO GET APPT BEFORE SHE SEES YOU BECAUSE HER O2 DROPPED AFTER SURGERY. I EXPRESSED TO PT THAT YOU WOULD PROBABLY WANT TO SEE HER BEFORE THE REFERRAL,  THANKS

## 2021-03-04 ENCOUNTER — OFFICE VISIT (OUTPATIENT)
Dept: INTERNAL MEDICINE | Facility: CLINIC | Age: 61
End: 2021-03-04

## 2021-03-04 VITALS
TEMPERATURE: 96.8 F | WEIGHT: 184 LBS | HEART RATE: 79 BPM | DIASTOLIC BLOOD PRESSURE: 72 MMHG | BODY MASS INDEX: 33.86 KG/M2 | OXYGEN SATURATION: 98 % | SYSTOLIC BLOOD PRESSURE: 118 MMHG | RESPIRATION RATE: 16 BRPM | HEIGHT: 62 IN

## 2021-03-04 DIAGNOSIS — Z00.00 ROUTINE HEALTH MAINTENANCE: ICD-10-CM

## 2021-03-04 DIAGNOSIS — N95.2 ATROPHIC VAGINITIS: ICD-10-CM

## 2021-03-04 DIAGNOSIS — Z98.890 HISTORY OF LUMBAR SURGERY: ICD-10-CM

## 2021-03-04 DIAGNOSIS — J30.9 ALLERGIC RHINITIS, UNSPECIFIED SEASONALITY, UNSPECIFIED TRIGGER: ICD-10-CM

## 2021-03-04 DIAGNOSIS — G47.00 INSOMNIA, UNSPECIFIED TYPE: ICD-10-CM

## 2021-03-04 DIAGNOSIS — E11.9 TYPE 2 DIABETES MELLITUS TREATED WITHOUT INSULIN (HCC): ICD-10-CM

## 2021-03-04 DIAGNOSIS — F32.A DEPRESSION, UNSPECIFIED DEPRESSION TYPE: Primary | ICD-10-CM

## 2021-03-04 PROCEDURE — 99214 OFFICE O/P EST MOD 30 MIN: CPT | Performed by: FAMILY MEDICINE

## 2021-03-04 RX ORDER — ASPIRIN 81 MG/1
81 TABLET ORAL DAILY
COMMUNITY
End: 2022-09-19

## 2021-03-04 RX ORDER — ATORVASTATIN CALCIUM 10 MG/1
10 TABLET, FILM COATED ORAL DAILY
Qty: 90 TABLET | Refills: 1 | Status: SHIPPED | OUTPATIENT
Start: 2021-03-04 | End: 2021-11-11 | Stop reason: SDUPTHER

## 2021-03-04 NOTE — PROGRESS NOTES
Subjective     Lilliam Portillo is a 60 y.o. female, who presents with a chief complaint of   Chief Complaint   Patient presents with   • Diabetes   • Depression       Diabetes    DepressionPatient presents with the following symptoms: insomnia.    Rash  Associated symptoms include diarrhea. Her past medical history is significant for allergies.   Insomnia  Associated symptoms include abdominal pain and a rash.   Allergies  Associated symptoms include abdominal pain and a rash.      1. Depression.  Takes bupropion and fluoxetine. She reports her symptoms are adequately controlled.    2. Insomnia.  Trazodone is helping.  Able to sleep well with this.  Hasn't been needing to use this much recently since she retired.    3. Allergic rhinitis.  Reports symptoms are controlled with nasal steroid.    4. Atrophic vaginitis.  Controlled with Premarin.    5. Diabetes.  She is taking Victoza 1.8 mg daily.  Denies hypoglycemia.  Had eye exam this past year.    6. Lumbar DDD.  She had L4-L5 fusion 12/28/2020 by Dr. Montes.  She reports that she is recovering well.    The following portions of the patient's history were reviewed and updated as appropriate: allergies, current medications, past family history, past medical history, past social history, past surgical history and problem list.    Allergies: Adhesive tape and Contrast dye    Review of Systems   Constitutional: Negative.    HENT: Negative.    Eyes: Negative.    Respiratory: Negative.    Cardiovascular: Negative.    Gastrointestinal: Positive for abdominal pain and diarrhea.   Endocrine: Negative.    Genitourinary: Negative.    Skin: Positive for rash.   Allergic/Immunologic: Positive for environmental allergies.   Hematological: Negative.    Psychiatric/Behavioral: Positive for sleep disturbance. The patient has insomnia.        Objective     Wt Readings from Last 3 Encounters:   03/04/21 83.5 kg (184 lb)   07/27/20 87.1 kg (192 lb)   04/27/20 89.4 kg (197 lb)     Temp  Readings from Last 3 Encounters:   03/04/21 96.8 °F (36 °C) (Temporal)   07/27/20 97.5 °F (36.4 °C)   04/27/20 97.8 °F (36.6 °C) (Temporal)     BP Readings from Last 3 Encounters:   03/04/21 118/72   07/27/20 130/74   04/27/20 136/74     Pulse Readings from Last 3 Encounters:   03/04/21 79   07/27/20 64   04/27/20 82     Body mass index is 33.65 kg/m².    Physical Exam   Constitutional: She is oriented to person, place, and time. She appears well-developed.   HENT:   Head: Normocephalic and atraumatic.   Mouth/Throat: Mucous membranes are moist.   Eyes: Conjunctivae are normal.   Neck: Neck supple. No neck rigidity. No thyromegaly present.   Cardiovascular: Normal rate, regular rhythm and normal heart sounds.   No murmur heard.  Pulmonary/Chest: Effort normal and breath sounds normal.   Abdominal: Soft. Normal appearance and bowel sounds are normal.   Musculoskeletal: Normal range of motion.      Right lower leg: No edema.      Left lower leg: No edema.   Neurological: She is alert and oriented to person, place, and time.   Skin: Skin is warm and dry. No rash noted.   Psychiatric: Her behavior is normal. Mood normal.   Nursing note and vitals reviewed.    Assessment/Plan   Diagnoses and all orders for this visit:    1. Depression, unspecified depression type (Primary)    2. Insomnia, unspecified type    3. Allergic rhinitis, unspecified seasonality, unspecified trigger    4. Atrophic vaginitis    5. Type 2 diabetes mellitus treated without insulin (CMS/[facility])  -     atorvastatin (LIPITOR) 10 MG tablet; Take 1 tablet by mouth Daily.  Dispense: 90 tablet; Refill: 1    6. History of lumbar surgery    7. Routine health maintenance    1. Depression.  Controlled.  Continue bupropion and fluoxetine.    2. Insomnia.  Benefiting from prn trazodone and lifestyle measures.  Continue same.    3. Allergic rhinitis. Continue nasal steroid.    4. Atrophic vaginitis.  Controlled with Premarin. We discussed risks and benefits.  She is  "UTD on mammogram.     5. Diabetes.  A1c 5.4.  Continue Victoza and lifestyle measures.  Add atorvastatin 10 mg daily.  On ASA.    6. History of lumbar surgery. Recovering well s/p L4-L5 fusion in December.    7. Routine health maint. Tdap UTD.  Doesn't need a pap smear.  Mammogram pending.  Colonoscopy due 11/2026.  Shingrix done at pharmacy.    8. Diarrhea X 2 months.  She is scheduled to see Dr. Bullock in [place] tomorrow.      Current Outpatient Medications:   •  albuterol (PROVENTIL) (2.5 MG/3ML) 0.083% nebulizer solution, Take 2.5 mg by nebulization Every 4 (Four) Hours As Needed for Wheezing or Shortness of Air., Disp: , Rfl:   •  ALLERGY SERUM INJECTION, Inject  under the skin into the appropriate area as directed 2 (Two) Times a Week., Disp: , Rfl:   •  aspirin 81 MG EC tablet, Take 81 mg by mouth Daily., Disp: , Rfl:   •  B-D INS SYR MICROFINE 1CC/28G 28G X 1/2\" 1 ML misc, , Disp: , Rfl:   •  Biotin 300 MCG tablet, Take 1 tablet by mouth Daily., Disp: , Rfl:   •  buPROPion XL (WELLBUTRIN XL) 300 MG 24 hr tablet, TAKE ONE TABLET BY MOUTH DAILY, Disp: 30 tablet, Rfl: 4  •  Cholecalciferol (VITAMIN D3) 2000 UNITS tablet, Take 1 tablet by mouth Daily., Disp: , Rfl:   •  clobetasol prop emollient base (TEMOVATE) 0.05 % emollient cream, Apply to affected areas twice daily, Disp: 15 g, Rfl: 0  •  dicyclomine (BENTYL) 20 MG tablet, Take 20 mg by mouth Every 6 (Six) Hours., Disp: , Rfl:   •  EPINEPHrine (EPIPEN) 0.3 MG/0.3ML solution auto-injector injection, , Disp: , Rfl:   •  FLUoxetine (PROzac) 20 MG capsule, TAKE ONE CAPSULE BY MOUTH EVERY NIGHT, Disp: 90 capsule, Rfl: 1  •  GRALISE 600 MG tablet tablet, Take 600 mg by mouth Daily., Disp: , Rfl:   •  Insulin Pen Needle 32G X 4 MM misc, 100 each Daily. For use with Victoza pen, Disp: 100 each, Rfl: 3  •  montelukast (SINGULAIR) 10 MG tablet, TAKE ONE TABLET BY MOUTH ONCE NIGHTLY, Disp: 90 tablet, Rfl: 1  •  Multiple Vitamin (MULTI VITAMIN) tablet, Take 1 tablet by " mouth Daily., Disp: , Rfl:   •  naproxen (NAPROSYN) 500 MG tablet, TAKE ONE TABLET BY MOUTH TWICE A DAY WITH MEALS, Disp: 60 tablet, Rfl: 1  •  Nutritional Supplements (DHEA PO), Take 1 tablet by mouth Daily., Disp: , Rfl:   •  Premarin 0.625 MG tablet, TAKE ONE TABLET BY MOUTH DAILY FOR 21 DAYS THEN DO NOT TAKE FOR 7 DAYS, Disp: 21 tablet, Rfl: 0  •  tiZANidine (ZANAFLEX) 4 MG tablet, TAKE ONE TABLET BY MOUTH EVERY NIGHT AS NEEDED FOR MUSCLE SPASMS, Disp: 30 tablet, Rfl: 0  •  traZODone (DESYREL) 50 MG tablet, TAKE ONE TABLET BY MOUTH EVERY NIGHT AT BEDTIME AS NEEDED FOR SLEEP, Disp: 90 tablet, Rfl: 1  •  triamcinolone (KENALOG) 0.025 % ointment, Apply  topically to the appropriate area as directed 2 (Two) Times a Day., Disp: 80 g, Rfl: 1  •  Victoza 18 MG/3ML solution pen-injector injection, DIAL AND INJECT UNDER THE SKIN 1.8 MG DAILY WITH DINNER, Disp: 5 pen, Rfl: 5  •  atorvastatin (LIPITOR) 10 MG tablet, Take 1 tablet by mouth Daily., Disp: 90 tablet, Rfl: 1  Medications Discontinued During This Encounter   Medication Reason   • metFORMIN ER (GLUCOPHAGE-XR) 500 MG 24 hr tablet *Therapy completed       Return in about 6 months (around 9/4/2021).

## 2021-03-30 DIAGNOSIS — G44.229 CHRONIC TENSION-TYPE HEADACHE, NOT INTRACTABLE: ICD-10-CM

## 2021-03-31 RX ORDER — TIZANIDINE 4 MG/1
TABLET ORAL
Qty: 30 TABLET | Refills: 0 | Status: SHIPPED | OUTPATIENT
Start: 2021-03-31 | End: 2021-11-11 | Stop reason: SDUPTHER

## 2021-04-17 DIAGNOSIS — N95.2 ATROPHIC VAGINITIS: ICD-10-CM

## 2021-04-19 RX ORDER — CONJUGATED ESTROGENS 0.62 MG/1
TABLET, FILM COATED ORAL
Qty: 21 TABLET | Refills: 0 | Status: SHIPPED | OUTPATIENT
Start: 2021-04-19 | End: 2021-06-03 | Stop reason: SDUPTHER

## 2021-04-29 RX ORDER — FLUOXETINE HYDROCHLORIDE 20 MG/1
CAPSULE ORAL
Qty: 90 CAPSULE | Refills: 1 | Status: SHIPPED | OUTPATIENT
Start: 2021-04-29 | End: 2021-11-11 | Stop reason: SDUPTHER

## 2021-05-12 RX ORDER — TRAZODONE HYDROCHLORIDE 50 MG/1
TABLET ORAL
Qty: 90 TABLET | Refills: 0 | Status: SHIPPED | OUTPATIENT
Start: 2021-05-12 | End: 2021-11-11 | Stop reason: SDUPTHER

## 2021-05-13 DIAGNOSIS — N95.2 ATROPHIC VAGINITIS: ICD-10-CM

## 2021-05-14 RX ORDER — CONJUGATED ESTROGENS 0.62 MG/1
TABLET, FILM COATED ORAL
Qty: 21 TABLET | Refills: 0 | OUTPATIENT
Start: 2021-05-14

## 2021-06-03 DIAGNOSIS — N95.2 ATROPHIC VAGINITIS: ICD-10-CM

## 2021-06-10 ENCOUNTER — TELEPHONE (OUTPATIENT)
Dept: INTERNAL MEDICINE | Facility: CLINIC | Age: 61
End: 2021-06-10

## 2021-06-10 NOTE — TELEPHONE ENCOUNTER
PA error message was corrected and then resubmitted. The PA was able to be approved. Pt was called and made aware of approval status as well.

## 2021-06-10 NOTE — TELEPHONE ENCOUNTER
Caller: DIAZ MULLINS/[facility] [employer]    Best call back number: [phone] REF ANDERSEN# [id number]    What was the call regarding: PRIOR AUTHORIZATION ERROR    Do you require a callback: YES    ERROR MESSAGE ON PRIOR AUTHORIZATION THAT HAS BEEN SUBMITTED REGARDING Victoza 18 MG/3ML solution pen-injector injection.   REQUESTING A CALLBACK REGARDING ERROR.

## 2021-06-30 RX ORDER — BUPROPION HYDROCHLORIDE 300 MG/1
TABLET ORAL
Qty: 30 TABLET | Refills: 3 | Status: SHIPPED | OUTPATIENT
Start: 2021-06-30 | End: 2021-11-11 | Stop reason: SDUPTHER

## 2021-07-02 ENCOUNTER — TELEPHONE (OUTPATIENT)
Dept: INTERNAL MEDICINE | Facility: CLINIC | Age: 61
End: 2021-07-02

## 2021-07-02 NOTE — TELEPHONE ENCOUNTER
PA DONE FOR HER VICTOZA AND IT WAS DETERMINED A PA WAS NOT REQUIRED. DETERMINATION FAXED TO HER PHARMACY FOR DOCUMENTATION. THEY WILL CONTACT HER WHEN IT IS READY FOR PICKUP.

## 2021-07-26 DIAGNOSIS — E11.9 TYPE 2 DIABETES MELLITUS TREATED WITHOUT INSULIN (HCC): ICD-10-CM

## 2021-07-28 RX ORDER — PEN NEEDLE, DIABETIC 32GX 5/32"
NEEDLE, DISPOSABLE MISCELLANEOUS
Qty: 100 EACH | Refills: 2 | Status: SHIPPED | OUTPATIENT
Start: 2021-07-28 | End: 2021-11-11 | Stop reason: SDUPTHER

## 2021-08-01 DIAGNOSIS — N95.2 ATROPHIC VAGINITIS: ICD-10-CM

## 2021-08-03 RX ORDER — MONTELUKAST SODIUM 10 MG/1
TABLET ORAL
Qty: 30 TABLET | Refills: 0 | Status: SHIPPED | OUTPATIENT
Start: 2021-08-03 | End: 2021-11-11 | Stop reason: SDUPTHER

## 2021-09-01 ENCOUNTER — TELEPHONE (OUTPATIENT)
Dept: INTERNAL MEDICINE | Facility: CLINIC | Age: 61
End: 2021-09-01

## 2021-09-01 NOTE — TELEPHONE ENCOUNTER
Caller: Lilliam Portillo    Relationship: Self    Best call back number: [phone]    What orders are you requesting (i.e. lab or imaging): LAB WORK    In what timeframe would the patient need to come in: ASAP    Where will you receive your lab/imaging services: [facility] IN [place]    Additional notes: APPT SET WITH DR. WOOD ON 09/09

## 2021-09-01 NOTE — TELEPHONE ENCOUNTER
Could you put in the lab orders she needs before her next appointment with you so we can send them to her

## 2021-09-02 DIAGNOSIS — Z00.00 ROUTINE HEALTH MAINTENANCE: ICD-10-CM

## 2021-09-02 DIAGNOSIS — E78.5 HYPERLIPIDEMIA, UNSPECIFIED HYPERLIPIDEMIA TYPE: Primary | ICD-10-CM

## 2021-09-09 ENCOUNTER — OFFICE VISIT (OUTPATIENT)
Dept: INTERNAL MEDICINE | Facility: CLINIC | Age: 61
End: 2021-09-09

## 2021-09-09 VITALS
SYSTOLIC BLOOD PRESSURE: 110 MMHG | RESPIRATION RATE: 19 BRPM | TEMPERATURE: 96.8 F | HEART RATE: 79 BPM | BODY MASS INDEX: 32.87 KG/M2 | HEIGHT: 62 IN | WEIGHT: 178.6 LBS | OXYGEN SATURATION: 97 % | DIASTOLIC BLOOD PRESSURE: 70 MMHG

## 2021-09-09 DIAGNOSIS — Z98.890 HISTORY OF LUMBAR SURGERY: ICD-10-CM

## 2021-09-09 DIAGNOSIS — F32.A DEPRESSION, UNSPECIFIED DEPRESSION TYPE: Primary | ICD-10-CM

## 2021-09-09 DIAGNOSIS — E11.9 TYPE 2 DIABETES MELLITUS TREATED WITHOUT INSULIN (HCC): ICD-10-CM

## 2021-09-09 DIAGNOSIS — J30.9 ALLERGIC RHINITIS, UNSPECIFIED SEASONALITY, UNSPECIFIED TRIGGER: ICD-10-CM

## 2021-09-09 DIAGNOSIS — N95.2 ATROPHIC VAGINITIS: ICD-10-CM

## 2021-09-09 DIAGNOSIS — G47.00 INSOMNIA, UNSPECIFIED TYPE: ICD-10-CM

## 2021-09-09 DIAGNOSIS — Z00.00 ROUTINE HEALTH MAINTENANCE: ICD-10-CM

## 2021-09-09 PROCEDURE — 99214 OFFICE O/P EST MOD 30 MIN: CPT | Performed by: FAMILY MEDICINE

## 2021-09-09 RX ORDER — CELECOXIB 200 MG/1
200 CAPSULE ORAL DAILY
Qty: 30 CAPSULE | Refills: 5 | Status: SHIPPED | OUTPATIENT
Start: 2021-09-09 | End: 2021-11-11 | Stop reason: SDUPTHER

## 2021-09-09 RX ORDER — CHOLESTYRAMINE 4 G/9G
POWDER, FOR SUSPENSION ORAL
COMMUNITY
Start: 2021-08-11 | End: 2022-09-19

## 2021-09-09 RX ORDER — ESTRADIOL 0.5 MG/1
0.5 TABLET ORAL DAILY
Qty: 30 TABLET | Refills: 11 | Status: SHIPPED | OUTPATIENT
Start: 2021-09-09 | End: 2021-11-11 | Stop reason: SDUPTHER

## 2021-09-09 NOTE — PROGRESS NOTES
Subjective     Lilliam Portillo is a 61 y.o. female, who presents with a chief complaint of   Chief Complaint   Patient presents with   • Diabetes   • Depression       Diabetes    DepressionPatient presents with the following symptoms: insomnia.    Rash  Her past medical history is significant for allergies.   Insomnia  Associated symptoms include a rash.   Allergies  Associated symptoms include a rash.      1. Depression.  Takes bupropion and fluoxetine. She reports her symptoms are adequately controlled.    2. Insomnia.  Trazodone helps. Hasn't been needing to use this much recently since she retired.    3. Allergic rhinitis.  Reports symptoms are controlled with nasal steroid.  She is undergoing immunotherapy with her ENT.    4. Atrophic vaginitis.  Premarin is no longer covered by insurance and she would like to try something else.    5. Diabetes.  She is taking Victoza 1.8 mg daily.  Denies hypoglycemia.  Had eye exam this past year.    6. Lumbar DDD.  She had L4-L5 fusion 12/28/2020 by Dr. Montes.  She reports that she is recovering well.    The following portions of the patient's history were reviewed and updated as appropriate: allergies, current medications, past family history, past medical history, past social history, past surgical history and problem list.    Allergies: Adhesive tape and Contrast dye    Review of Systems   Constitutional: Negative.    HENT: Negative.    Eyes: Negative.    Respiratory: Negative.    Cardiovascular: Negative.    Gastrointestinal: Negative.    Endocrine: Negative.    Genitourinary: Negative.    Skin: Positive for rash.   Allergic/Immunologic: Positive for environmental allergies.   Hematological: Negative.    Psychiatric/Behavioral: Negative for sleep disturbance. The patient has insomnia.        Objective     Wt Readings from Last 3 Encounters:   09/09/21 81 kg (178 lb 9.6 oz)   03/04/21 83.5 kg (184 lb)   07/27/20 87.1 kg (192 lb)     Temp Readings from Last 3 Encounters:    09/09/21 96.8 °F (36 °C)   03/04/21 96.8 °F (36 °C) (Temporal)   07/27/20 97.5 °F (36.4 °C)     BP Readings from Last 3 Encounters:   09/09/21 110/70   03/04/21 118/72   07/27/20 130/74     Pulse Readings from Last 3 Encounters:   09/09/21 79   03/04/21 79   07/27/20 64     Body mass index is 32.66 kg/m².    Physical Exam   Constitutional: She is oriented to person, place, and time. She appears well-developed.   HENT:   Head: Normocephalic and atraumatic.   Mouth/Throat: Mucous membranes are moist.   Eyes: Conjunctivae are normal.   Neck: No thyromegaly present.   Cardiovascular: Normal rate, regular rhythm and normal heart sounds.   No murmur heard.  Pulmonary/Chest: Effort normal and breath sounds normal.   Abdominal: Soft. Normal appearance and bowel sounds are normal.   Musculoskeletal: Normal range of motion.      Right lower leg: No edema.      Left lower leg: No edema.   Neurological: She is alert and oriented to person, place, and time.   Skin: Skin is warm and dry. No rash noted.   Psychiatric: Her behavior is normal. Mood normal.   Nursing note and vitals reviewed.    Assessment/Plan   Diagnoses and all orders for this visit:    1. Depression, unspecified depression type (Primary)  -     CBC & Differential; Future  -     TSH; Future    2. Insomnia, unspecified type    3. Atrophic vaginitis  -     estradiol (Estrace) 0.5 MG tablet; Take 1 tablet by mouth Daily.  Dispense: 30 tablet; Refill: 11    4. Allergic rhinitis, unspecified seasonality, unspecified trigger    5. Type 2 diabetes mellitus treated without insulin (CMS/[facility])  -     Lipid Panel With / Chol / HDL Ratio; Future  -     Vitamin B12; Future  -     Hemoglobin A1c; Future    6. History of lumbar surgery  -     celecoxib (CeleBREX) 200 MG capsule; Take 1 capsule by mouth Daily.  Dispense: 30 capsule; Refill: 5    7. Routine health maintenance  -     Comprehensive Metabolic Panel; Future  -     Vitamin D 25 Hydroxy; Future    1. Depression.   "Controlled.  Continue bupropion and fluoxetine.    2. Insomnia.  Benefiting from prn trazodone and lifestyle measures.  Continue same.    3. Allergic rhinitis. Continue nasal steroid and immunotherapy.    4. Atrophic vaginitis.  D/C Premarin d/t insurance coverage.  Start Estrace 0.5 mg daily. We discussed risks and benefits.  She is UTD on mammogram.     5. Diabetes.  A1c 6.2.  Continue Victoza and lifestyle measures. On atorvastatin 10 mg daily.  On ASA.    6. History of lumbar surgery.  Recovering well s/p L4-L5 fusion in December.  Add celexocib.    7. Routine health maint.  Tdap UTD.  Doesn't need a pap smear.  Mammogram UTD.  Colonoscopy due 11/2026.  Shingrix done at pharmacy.        Current Outpatient Medications:   •  albuterol (PROVENTIL) (2.5 MG/3ML) 0.083% nebulizer solution, Take 2.5 mg by nebulization Every 4 (Four) Hours As Needed for Wheezing or Shortness of Air., Disp: , Rfl:   •  ALLERGY SERUM INJECTION, Inject  under the skin into the appropriate area as directed 2 (Two) Times a Week., Disp: , Rfl:   •  aspirin 81 MG EC tablet, Take 81 mg by mouth Daily., Disp: , Rfl:   •  atorvastatin (LIPITOR) 10 MG tablet, Take 1 tablet by mouth Daily., Disp: 90 tablet, Rfl: 1  •  B-D INS SYR MICROFINE 1CC/28G 28G X 1/2\" 1 ML misc, , Disp: , Rfl:   •  BD Pen Needle Leta U/F 32G X 4 MM misc, USE DAILY WITH VICTOZA PEN, Disp: 100 each, Rfl: 2  •  Biotin 300 MCG tablet, Take 1 tablet by mouth Daily., Disp: , Rfl:   •  buPROPion XL (WELLBUTRIN XL) 300 MG 24 hr tablet, TAKE ONE TABLET BY MOUTH DAILY, Disp: 30 tablet, Rfl: 3  •  Cholecalciferol (VITAMIN D3) 2000 UNITS tablet, Take 1 tablet by mouth Daily., Disp: , Rfl:   •  cholestyramine (QUESTRAN) 4 g packet, , Disp: , Rfl:   •  clobetasol prop emollient base (TEMOVATE) 0.05 % emollient cream, Apply to affected areas twice daily, Disp: 15 g, Rfl: 0  •  dicyclomine (BENTYL) 20 MG tablet, Take 20 mg by mouth Every 6 (Six) Hours., Disp: , Rfl:   •  EPINEPHrine " (EPIPEN) 0.3 MG/0.3ML solution auto-injector injection, , Disp: , Rfl:   •  FLUoxetine (PROzac) 20 MG capsule, TAKE ONE CAPSULE BY MOUTH EVERY NIGHT, Disp: 90 capsule, Rfl: 1  •  GRALISE 600 MG tablet tablet, Take 600 mg by mouth Daily., Disp: , Rfl:   •  montelukast (SINGULAIR) 10 MG tablet, TAKE ONE TABLET BY MOUTH ONCE NIGHTLY, Disp: 30 tablet, Rfl: 0  •  Multiple Vitamin (MULTI VITAMIN) tablet, Take 1 tablet by mouth Daily., Disp: , Rfl:   •  Nutritional Supplements (DHEA PO), Take 1 tablet by mouth Daily., Disp: , Rfl:   •  tiZANidine (ZANAFLEX) 4 MG tablet, TAKE ONE TABLET BY MOUTH EVERY NIGHT AS NEEDED FOR MUSCLE SPASMS, Disp: 30 tablet, Rfl: 0  •  traZODone (DESYREL) 50 MG tablet, TAKE ONE TABLET BY MOUTH EVERY NIGHT AT BEDTIME AS NEEDED FOR SLEEP, Disp: 90 tablet, Rfl: 0  •  triamcinolone (KENALOG) 0.025 % ointment, Apply  topically to the appropriate area as directed 2 (Two) Times a Day., Disp: 80 g, Rfl: 1  •  Victoza 18 MG/3ML solution pen-injector injection, DIAL AND INJECT UNDER THE SKIN 1.8 MG DAILY WITH DINNER, Disp: 5 pen, Rfl: 5  •  celecoxib (CeleBREX) 200 MG capsule, Take 1 capsule by mouth Daily., Disp: 30 capsule, Rfl: 5  •  estradiol (Estrace) 0.5 MG tablet, Take 1 tablet by mouth Daily., Disp: 30 tablet, Rfl: 11  Medications Discontinued During This Encounter   Medication Reason   • estrogens, conjugated, (Premarin) 0.625 MG tablet    • naproxen (NAPROSYN) 500 MG tablet        Return in about 6 months (around 3/9/2022).

## 2021-09-17 ENCOUNTER — TELEPHONE (OUTPATIENT)
Dept: INTERNAL MEDICINE | Facility: CLINIC | Age: 61
End: 2021-09-17

## 2021-09-17 NOTE — TELEPHONE ENCOUNTER
Patient is experiencing dizzyness per 3 days, balance issues and heard a pop in neck.    Would like to speak to someone in clinical.    [phone]

## 2021-09-17 NOTE — TELEPHONE ENCOUNTER
Patient stated that when leaning head back gets dizzy, off balance, laying in bed and rolled over and heard a pop in neck, had back surgery back in December and the same thing happened and went away but is worried about

## 2021-11-11 DIAGNOSIS — G44.229 CHRONIC TENSION-TYPE HEADACHE, NOT INTRACTABLE: ICD-10-CM

## 2021-11-11 DIAGNOSIS — Z98.890 HISTORY OF LUMBAR SURGERY: ICD-10-CM

## 2021-11-11 DIAGNOSIS — N95.2 ATROPHIC VAGINITIS: ICD-10-CM

## 2021-11-11 DIAGNOSIS — L23.7 CONTACT DERMATITIS DUE TO POISON IVY: ICD-10-CM

## 2021-11-11 DIAGNOSIS — E11.9 TYPE 2 DIABETES MELLITUS TREATED WITHOUT INSULIN (HCC): ICD-10-CM

## 2021-11-11 RX ORDER — ATORVASTATIN CALCIUM 10 MG/1
10 TABLET, FILM COATED ORAL DAILY
Qty: 90 TABLET | Refills: 1 | Status: SHIPPED | OUTPATIENT
Start: 2021-11-11 | End: 2022-05-13 | Stop reason: SDUPTHER

## 2021-11-11 RX ORDER — CELECOXIB 200 MG/1
200 CAPSULE ORAL DAILY
Qty: 30 CAPSULE | Refills: 5 | Status: SHIPPED | OUTPATIENT
Start: 2021-11-11 | End: 2022-09-27 | Stop reason: SDUPTHER

## 2021-11-11 RX ORDER — MONTELUKAST SODIUM 10 MG/1
10 TABLET ORAL NIGHTLY
Qty: 90 TABLET | Refills: 1 | Status: SHIPPED | OUTPATIENT
Start: 2021-11-11 | End: 2022-05-13 | Stop reason: SDUPTHER

## 2021-11-11 RX ORDER — PEN NEEDLE, DIABETIC 32GX 5/32"
NEEDLE, DISPOSABLE MISCELLANEOUS
Qty: 100 EACH | Refills: 2 | Status: SHIPPED | OUTPATIENT
Start: 2021-11-11 | End: 2022-08-24 | Stop reason: SDUPTHER

## 2021-11-11 RX ORDER — BUPROPION HYDROCHLORIDE 300 MG/1
300 TABLET ORAL DAILY
Qty: 90 TABLET | Refills: 1 | Status: SHIPPED | OUTPATIENT
Start: 2021-11-11 | End: 2022-05-16 | Stop reason: SDUPTHER

## 2021-11-11 RX ORDER — TIZANIDINE 4 MG/1
4 TABLET ORAL NIGHTLY PRN
Qty: 30 TABLET | Refills: 0 | Status: SHIPPED | OUTPATIENT
Start: 2021-11-11 | End: 2023-03-20

## 2021-11-11 RX ORDER — TRAZODONE HYDROCHLORIDE 50 MG/1
50 TABLET ORAL NIGHTLY PRN
Qty: 90 TABLET | Refills: 1 | Status: SHIPPED | OUTPATIENT
Start: 2021-11-11 | End: 2022-05-16 | Stop reason: SDUPTHER

## 2021-11-11 RX ORDER — FLUOXETINE HYDROCHLORIDE 20 MG/1
20 CAPSULE ORAL NIGHTLY
Qty: 90 CAPSULE | Refills: 1 | Status: SHIPPED | OUTPATIENT
Start: 2021-11-11 | End: 2022-05-16 | Stop reason: SDUPTHER

## 2021-11-11 RX ORDER — TRIAMCINOLONE ACETONIDE 0.25 MG/G
OINTMENT TOPICAL 2 TIMES DAILY
Qty: 80 G | Refills: 1 | Status: SHIPPED | OUTPATIENT
Start: 2021-11-11 | End: 2023-03-20

## 2021-11-11 RX ORDER — ESTRADIOL 0.5 MG/1
0.5 TABLET ORAL DAILY
Qty: 30 TABLET | Refills: 11 | Status: SHIPPED | OUTPATIENT
Start: 2021-11-11 | End: 2022-01-19

## 2021-11-11 RX ORDER — CLOBETASOL PROPIONATE 0.5 MG/G
CREAM TOPICAL
Qty: 15 G | Refills: 0 | Status: SHIPPED | OUTPATIENT
Start: 2021-11-11

## 2021-11-11 RX ORDER — LIRAGLUTIDE 6 MG/ML
1.8 INJECTION SUBCUTANEOUS DAILY
Qty: 5 PEN | Refills: 5 | Status: SHIPPED | OUTPATIENT
Start: 2021-11-11 | End: 2022-01-19

## 2021-11-11 NOTE — TELEPHONE ENCOUNTER
Caller: Lilliam Portillo    Relationship: Self    Best call back number: [phone]     Requested Prescriptions:   Requested Prescriptions     Pending Prescriptions Disp Refills   • atorvastatin (LIPITOR) 10 MG tablet 90 tablet 1     Sig: Take 1 tablet by mouth Daily.   • Insulin Pen Needle (BD Pen Needle Leta U/F) 32G X 4 MM misc 100 each 2   • Liraglutide (Victoza) 18 MG/3ML solution pen-injector injection 5 pen 5   • buPROPion XL (WELLBUTRIN XL) 300 MG 24 hr tablet 30 tablet 3     Sig: Take 1 tablet by mouth Daily.   • celecoxib (CeleBREX) 200 MG capsule 30 capsule 5     Sig: Take 1 capsule by mouth Daily.   • clobetasol prop emollient base (TEMOVATE) 0.05 % emollient cream 15 g 0     Sig: Apply to affected areas twice daily   • estradiol (Estrace) 0.5 MG tablet 30 tablet 11     Sig: Take 1 tablet by mouth Daily.   • FLUoxetine (PROzac) 20 MG capsule 90 capsule 1     Sig: Take 1 capsule by mouth Every Night.   • montelukast (SINGULAIR) 10 MG tablet 30 tablet 0     Sig: Take 1 tablet by mouth Every Night.   • tiZANidine (ZANAFLEX) 4 MG tablet 30 tablet 0     Sig: Take 1 tablet by mouth At Night As Needed for Muscle Spasms.   • traZODone (DESYREL) 50 MG tablet 90 tablet 0     Sig: Take 1 tablet by mouth At Night As Needed for Sleep.   • triamcinolone (KENALOG) 0.025 % ointment 80 g 1     Sig: Apply  topically to the appropriate area as directed 2 (Two) Times a Day.        Pharmacy where request should be sent: [facility] [address] [place] [address] [place] - [address] [place] [address] 27 - [phone] PH - [phone] FX     Does the patient have less than a 3 day supply:  [x] Yes  [] No    Adenike Ortiz Rep   11/11/21 11:11 EST

## 2021-11-30 ENCOUNTER — TELEPHONE (OUTPATIENT)
Dept: INTERNAL MEDICINE | Facility: CLINIC | Age: 61
End: 2021-11-30

## 2021-11-30 NOTE — TELEPHONE ENCOUNTER
Caller: Lilliam Portillo     Relationship: PATIENT    Best call back number: [phone] (H)    What is your medical concern? PATIENT EXPERIENCING STOMACH PROBLEMS WHEN TAKING Liraglutide (Victoza) 18 MG/3ML solution pen-injector injection    SHE RAN OUT OF MEDICATION AND HER STOMACH PROBLEMS STOPPED. WHEN SHE RESUMED TAKING HER MEDICATION HER STOMACH PROBLEMS CAME BACK. WOULD LIKE HER MEDICATION SWITCHED TO SOMETHING DIFFERENT.    PLEASE CALL PATIENT BACK TO ADVISE

## 2021-12-02 NOTE — TELEPHONE ENCOUNTER
Stay off the Victoza X 2 weeks.  Restart the Victoza at the lowest dose (0.6 mg daily) and stay at that dose.  If the stomach problems return, stay off of it until next visit.

## 2022-01-19 ENCOUNTER — OFFICE VISIT (OUTPATIENT)
Dept: INTERNAL MEDICINE | Facility: CLINIC | Age: 62
End: 2022-01-19

## 2022-01-19 VITALS
HEIGHT: 62 IN | DIASTOLIC BLOOD PRESSURE: 65 MMHG | TEMPERATURE: 97.8 F | SYSTOLIC BLOOD PRESSURE: 115 MMHG | OXYGEN SATURATION: 98 % | BODY MASS INDEX: 32.94 KG/M2 | HEART RATE: 72 BPM | RESPIRATION RATE: 16 BRPM | WEIGHT: 179 LBS

## 2022-01-19 DIAGNOSIS — E11.9 TYPE 2 DIABETES MELLITUS TREATED WITHOUT INSULIN: Primary | ICD-10-CM

## 2022-01-19 DIAGNOSIS — M48.062 SPINAL STENOSIS, LUMBAR REGION, WITH NEUROGENIC CLAUDICATION: ICD-10-CM

## 2022-01-19 DIAGNOSIS — N95.2 ATROPHIC VAGINITIS: ICD-10-CM

## 2022-01-19 PROCEDURE — 99214 OFFICE O/P EST MOD 30 MIN: CPT | Performed by: FAMILY MEDICINE

## 2022-01-19 RX ORDER — ESTRADIOL 0.1 MG/G
CREAM VAGINAL
Qty: 42.5 G | Refills: 12 | Status: SHIPPED | OUTPATIENT
Start: 2022-01-19 | End: 2022-02-16

## 2022-01-19 NOTE — PROGRESS NOTES
Subjective   Lilliam Portillo is a 61 y.o. female presenting today for follow up of   Chief Complaint   Patient presents with   • Hospital Follow Up Visit     back surgery   • Back Pain     f/u surgery   • Diabetes       History of Present Illness     1. Pt here for hospital follow-up.  She had L3-4 fusion by Dr. Matt at [facility] on 1/3/2022.  She was discharged 1/6/2022.  She reports that the surgery went well and she seems to be recovering well.  She has f/u with her surgeon tomorrow.  I do not have hospital records yet but she denies any problems during the hospitalization with her blood sugar, etc.    2. DMII.  Pt stopped the Victoza due to abdominal pain, which resolved off the medication.  She restarted it at 0.6 mg daily and the pain returned so she stopped it.  She is not taking anything for her diabetes currently.    3. Atrophic vaginitis.  Pt reports that the estradiol tablet isn't helping.  She previously took Premarin, which was effective but is not on her insurance formulary.    Patient Active Problem List   Diagnosis   • Atopic rhinitis   • Degeneration of intervertebral disc of cervical region   • Degeneration of intervertebral disc of lumbar region   • Depression   • Type 2 diabetes mellitus treated without insulin (HCC)   • Insomnia   • Atrophic vaginitis   • Pulmonary nodules   • History of diabetes mellitus resolved following bariatric surgery   • History of bariatric surgery   • Diverticulosis   • Skull defect   • Lumbar radiculopathy, acute   • Spinal stenosis, lumbar region, with neurogenic claudication   • Routine health maintenance   • History of lumbar surgery       Current Outpatient Medications on File Prior to Visit   Medication Sig   • albuterol (PROVENTIL) (2.5 MG/3ML) 0.083% nebulizer solution Take 2.5 mg by nebulization Every 4 (Four) Hours As Needed for Wheezing or Shortness of Air.   • ALLERGY SERUM INJECTION Inject  under the skin into the appropriate area as directed 2  "(Two) Times a Week.   • aspirin 81 MG EC tablet Take 81 mg by mouth Daily.   • atorvastatin (LIPITOR) 10 MG tablet Take 1 tablet by mouth Daily.   • B-D INS SYR MICROFINE 1CC/28G 28G X 1/2\" 1 ML misc    • Biotin 300 MCG tablet Take 1 tablet by mouth Daily.   • buPROPion XL (WELLBUTRIN XL) 300 MG 24 hr tablet Take 1 tablet by mouth Daily.   • celecoxib (CeleBREX) 200 MG capsule Take 1 capsule by mouth Daily.   • cholestyramine (QUESTRAN) 4 g packet    • clobetasol prop emollient base (TEMOVATE) 0.05 % emollient cream Apply to affected areas twice daily   • dicyclomine (BENTYL) 20 MG tablet Take 20 mg by mouth Every 6 (Six) Hours.   • EPINEPHrine (EPIPEN) 0.3 MG/0.3ML solution auto-injector injection    • FLUoxetine (PROzac) 20 MG capsule Take 1 capsule by mouth Every Night.   • GRALISE 600 MG tablet tablet Take 600 mg by mouth Daily.   • Insulin Pen Needle (BD Pen Needle Leta U/F) 32G X 4 MM misc Use daily with Victoza pen.   • montelukast (SINGULAIR) 10 MG tablet Take 1 tablet by mouth Every Night.   • Multiple Vitamin (MULTI VITAMIN) tablet Take 1 tablet by mouth Daily.   • Nutritional Supplements (DHEA PO) Take 1 tablet by mouth Daily.   • tiZANidine (ZANAFLEX) 4 MG tablet Take 1 tablet by mouth At Night As Needed for Muscle Spasms.   • traZODone (DESYREL) 50 MG tablet Take 1 tablet by mouth At Night As Needed for Sleep.   • triamcinolone (KENALOG) 0.025 % ointment Apply  topically to the appropriate area as directed 2 (Two) Times a Day.   • [DISCONTINUED] estradiol (Estrace) 0.5 MG tablet Take 1 tablet by mouth Daily.   • [DISCONTINUED] Cholecalciferol (VITAMIN D3) 2000 UNITS tablet Take 1 tablet by mouth Daily.   • [DISCONTINUED] Liraglutide (Victoza) 18 MG/3ML solution pen-injector injection Inject 1.8 mg under the skin into the appropriate area as directed Daily.     No current facility-administered medications on file prior to visit.          The following portions of the patient's history were reviewed and " "updated as appropriate: allergies, current medications, past family history, past medical history, past social history, past surgical history and problem list.    Review of Systems   Constitutional: Negative.    Respiratory: Negative.    Cardiovascular: Negative.    Genitourinary: Positive for vaginal pain.       Objective   Vitals:    01/19/22 0940   BP: 115/65   Pulse: 72   Resp: 16   Temp: 97.8 °F (36.6 °C)   SpO2: 98%   Weight: 81.2 kg (179 lb)   Height: 157.5 cm (62.01\")       BP Readings from Last 3 Encounters:   01/19/22 115/65   09/09/21 110/70   03/04/21 118/72        Wt Readings from Last 3 Encounters:   01/19/22 81.2 kg (179 lb)   09/09/21 81 kg (178 lb 9.6 oz)   03/04/21 83.5 kg (184 lb)        Body mass index is 32.73 kg/m².  Nursing notes and vitals reviewed.    Physical Exam  Vitals and nursing note reviewed.   Constitutional:       Appearance: Normal appearance.   HENT:      Head: Normocephalic and atraumatic.      Mouth/Throat:      Mouth: Mucous membranes are moist.   Eyes:      Extraocular Movements: Extraocular movements intact.      Conjunctiva/sclera: Conjunctivae normal.   Pulmonary:      Effort: Pulmonary effort is normal. No respiratory distress.   Musculoskeletal:      Cervical back: Neck supple. No rigidity.      Right lower leg: No edema.      Left lower leg: No edema.   Skin:     General: Skin is warm and dry.   Neurological:      General: No focal deficit present.      Mental Status: She is alert and oriented to person, place, and time.   Psychiatric:         Mood and Affect: Mood normal.         Behavior: Behavior normal.         No results found for this or any previous visit (from the past 672 hour(s)).      Assessment/Plan   Diagnoses and all orders for this visit:    1. Type 2 diabetes mellitus treated without insulin (HCC) (Primary)  -     Semaglutide,0.25 or 0.5MG/DOS, (OZEMPIC) 2 MG/1.5ML solution pen-injector; Inject 0.5 mg under the skin into the appropriate area as directed 1 " (One) Time Per Week. Start with 0.25 mg weekly for 4 weeks, then 0.5 mg weekly thereafter.  Dispense: 1 pen; Refill: 5    2. Atrophic vaginitis  -     estradiol (ESTRACE VAGINAL) 0.1 MG/GM vaginal cream; 2 g PV daily X 2 weeks, then twice weekly thereafter.  Dispense: 42.5 g; Refill: 12    3. Spinal stenosis, lumbar region, with neurogenic claudication        1. DMII.  Off Victoza d/t side effects.  Trial of Ozempic, 0.25 mg weekly X 4 weeks, then 0.5 mg weekly thereafter.  F/u 2 months as scheduled.    2. Atrophic vaginitis.  Stop estradiol tablet.  Start estradiol vaginal cream.    3. Lumbar spinal stenosis.  Pt recovering well from recent surgery.  She has f/u with Dr. Matt tomorrow.      Medications, including side effects, were discussed with the patient. Patient verbalized understanding.  The plan of care was discussed. All questions were answered. Patient verbalized understanding.      Return for Next scheduled follow up.

## 2022-02-16 ENCOUNTER — TELEPHONE (OUTPATIENT)
Dept: INTERNAL MEDICINE | Facility: CLINIC | Age: 62
End: 2022-02-16

## 2022-02-16 RX ORDER — ESTRADIOL 10 UG/1
1 INSERT VAGINAL 2 TIMES WEEKLY
Qty: 8 TABLET | Refills: 5 | Status: SHIPPED | OUTPATIENT
Start: 2022-02-17 | End: 2022-09-19 | Stop reason: SDUPTHER

## 2022-02-16 NOTE — TELEPHONE ENCOUNTER
"  Caller: Lilliam Portillo    Relationship: Self    Best call back number: [phone]*    What medications are you currently taking:   Current Outpatient Medications on File Prior to Visit   Medication Sig Dispense Refill   • albuterol (PROVENTIL) (2.5 MG/3ML) 0.083% nebulizer solution Take 2.5 mg by nebulization Every 4 (Four) Hours As Needed for Wheezing or Shortness of Air.     • ALLERGY SERUM INJECTION Inject  under the skin into the appropriate area as directed 2 (Two) Times a Week.     • aspirin 81 MG EC tablet Take 81 mg by mouth Daily.     • atorvastatin (LIPITOR) 10 MG tablet Take 1 tablet by mouth Daily. 90 tablet 1   • B-D INS SYR MICROFINE 1CC/28G 28G X 1/2\" 1 ML misc      • Biotin 300 MCG tablet Take 1 tablet by mouth Daily.     • buPROPion XL (WELLBUTRIN XL) 300 MG 24 hr tablet Take 1 tablet by mouth Daily. 90 tablet 1   • celecoxib (CeleBREX) 200 MG capsule Take 1 capsule by mouth Daily. 30 capsule 5   • cholestyramine (QUESTRAN) 4 g packet      • clobetasol prop emollient base (TEMOVATE) 0.05 % emollient cream Apply to affected areas twice daily 15 g 0   • dicyclomine (BENTYL) 20 MG tablet Take 20 mg by mouth Every 6 (Six) Hours.     • EPINEPHrine (EPIPEN) 0.3 MG/0.3ML solution auto-injector injection      • estradiol (ESTRACE VAGINAL) 0.1 MG/GM vaginal cream 2 g PV daily X 2 weeks, then twice weekly thereafter. 42.5 g 12   • FLUoxetine (PROzac) 20 MG capsule Take 1 capsule by mouth Every Night. 90 capsule 1   • GRALISE 600 MG tablet tablet Take 600 mg by mouth Daily.     • Insulin Pen Needle (BD Pen Needle Leta U/F) 32G X 4 MM misc Use daily with Victoza pen. 100 each 2   • montelukast (SINGULAIR) 10 MG tablet Take 1 tablet by mouth Every Night. 90 tablet 1   • Multiple Vitamin (MULTI VITAMIN) tablet Take 1 tablet by mouth Daily.     • Nutritional Supplements (DHEA PO) Take 1 tablet by mouth Daily.     • Semaglutide,0.25 or 0.5MG/DOS, (OZEMPIC) 2 MG/1.5ML solution pen-injector Inject 0.5 mg under " the skin into the appropriate area as directed 1 (One) Time Per Week. Start with 0.25 mg weekly for 4 weeks, then 0.5 mg weekly thereafter. 1 pen 5   • tiZANidine (ZANAFLEX) 4 MG tablet Take 1 tablet by mouth At Night As Needed for Muscle Spasms. 30 tablet 0   • traZODone (DESYREL) 50 MG tablet Take 1 tablet by mouth At Night As Needed for Sleep. 90 tablet 1   • triamcinolone (KENALOG) 0.025 % ointment Apply  topically to the appropriate area as directed 2 (Two) Times a Day. 80 g 1     No current facility-administered medications on file prior to visit.          When did you start taking these medications: 3 WEEKS    Which medication are you concerned about: ESTRADIOL VAGINAL CREAM    Who prescribed you this medication: DR. WOOD    What are your concerns: ITCHING IN VAGINAL AREA    How long have you had these concerns: SINCE STARTING MEDICATION

## 2022-03-08 LAB
25(OH)D3+25(OH)D2 SERPL-MCNC: 38 NG/ML (ref 30–100)
ALBUMIN SERPL-MCNC: 4.6 G/DL (ref 3.8–4.9)
ALBUMIN/GLOB SERPL: 2.1 {RATIO} (ref 1.2–2.2)
ALP SERPL-CCNC: 128 IU/L (ref 44–121)
ALT SERPL-CCNC: 25 IU/L (ref 0–32)
AST SERPL-CCNC: 32 IU/L (ref 0–40)
BASOPHILS # BLD AUTO: 0.1 X10E3/UL (ref 0–0.2)
BASOPHILS NFR BLD AUTO: 1 %
BILIRUB SERPL-MCNC: 0.4 MG/DL (ref 0–1.2)
BUN SERPL-MCNC: 10 MG/DL (ref 6–24)
BUN/CREAT SERPL: 13 (ref 9–23)
CALCIUM SERPL-MCNC: 9.8 MG/DL (ref 8.7–10.2)
CHLORIDE SERPL-SCNC: 103 MMOL/L (ref 96–106)
CHOLEST SERPL-MCNC: 210 MG/DL (ref 100–199)
CHOLEST/HDLC SERPL: 1.9 RATIO (ref 0–4.4)
CO2 SERPL-SCNC: 22 MMOL/L (ref 20–29)
CREAT SERPL-MCNC: 0.77 MG/DL (ref 0.57–1)
EGFR GENE MUT ANL BLD/T: 89 ML/MIN/1.73
EOSINOPHIL # BLD AUTO: 0.1 X10E3/UL (ref 0–0.4)
EOSINOPHIL NFR BLD AUTO: 2 %
ERYTHROCYTE [DISTWIDTH] IN BLOOD BY AUTOMATED COUNT: 13.6 % (ref 11.7–15.4)
GLOBULIN SER CALC-MCNC: 2.2 G/DL (ref 1.5–4.5)
GLUCOSE SERPL-MCNC: 109 MG/DL (ref 65–99)
HBA1C MFR BLD: 6 % (ref 4.8–5.6)
HCT VFR BLD AUTO: 42 % (ref 34–46.6)
HDLC SERPL-MCNC: 111 MG/DL
HGB BLD-MCNC: 13.3 G/DL (ref 11.1–15.9)
IMM GRANULOCYTES # BLD AUTO: 0 X10E3/UL (ref 0–0.1)
IMM GRANULOCYTES NFR BLD AUTO: 0 %
LDLC SERPL CALC-MCNC: 85 MG/DL (ref 0–99)
LYMPHOCYTES # BLD AUTO: 1.9 X10E3/UL (ref 0.7–3.1)
LYMPHOCYTES NFR BLD AUTO: 40 %
MCH RBC QN AUTO: 26.9 PG (ref 26.6–33)
MCHC RBC AUTO-ENTMCNC: 31.7 G/DL (ref 31.5–35.7)
MCV RBC AUTO: 85 FL (ref 79–97)
MONOCYTES # BLD AUTO: 0.4 X10E3/UL (ref 0.1–0.9)
MONOCYTES NFR BLD AUTO: 9 %
NEUTROPHILS # BLD AUTO: 2.3 X10E3/UL (ref 1.4–7)
NEUTROPHILS NFR BLD AUTO: 48 %
PLATELET # BLD AUTO: 362 X10E3/UL (ref 150–450)
POTASSIUM SERPL-SCNC: 4.3 MMOL/L (ref 3.5–5.2)
PROT SERPL-MCNC: 6.8 G/DL (ref 6–8.5)
RBC # BLD AUTO: 4.95 X10E6/UL (ref 3.77–5.28)
SODIUM SERPL-SCNC: 141 MMOL/L (ref 134–144)
TRIGL SERPL-MCNC: 84 MG/DL (ref 0–149)
TSH SERPL DL<=0.005 MIU/L-ACNC: 2.48 UIU/ML (ref 0.45–4.5)
VIT B12 SERPL-MCNC: 590 PG/ML (ref 232–1245)
VLDLC SERPL CALC-MCNC: 14 MG/DL (ref 5–40)
WBC # BLD AUTO: 4.8 X10E3/UL (ref 3.4–10.8)

## 2022-03-14 ENCOUNTER — TELEPHONE (OUTPATIENT)
Dept: INTERNAL MEDICINE | Facility: CLINIC | Age: 62
End: 2022-03-14

## 2022-03-14 ENCOUNTER — TELEMEDICINE (OUTPATIENT)
Dept: INTERNAL MEDICINE | Facility: CLINIC | Age: 62
End: 2022-03-14

## 2022-03-14 DIAGNOSIS — Z98.890 HISTORY OF LUMBAR SURGERY: ICD-10-CM

## 2022-03-14 DIAGNOSIS — J30.9 ALLERGIC RHINITIS, UNSPECIFIED SEASONALITY, UNSPECIFIED TRIGGER: ICD-10-CM

## 2022-03-14 DIAGNOSIS — F32.A DEPRESSION, UNSPECIFIED DEPRESSION TYPE: Primary | ICD-10-CM

## 2022-03-14 DIAGNOSIS — Z00.00 ROUTINE HEALTH MAINTENANCE: ICD-10-CM

## 2022-03-14 DIAGNOSIS — E11.9 TYPE 2 DIABETES MELLITUS TREATED WITHOUT INSULIN: ICD-10-CM

## 2022-03-14 DIAGNOSIS — N95.2 ATROPHIC VAGINITIS: ICD-10-CM

## 2022-03-14 DIAGNOSIS — G47.00 INSOMNIA, UNSPECIFIED TYPE: ICD-10-CM

## 2022-03-14 PROCEDURE — 99214 OFFICE O/P EST MOD 30 MIN: CPT | Performed by: FAMILY MEDICINE

## 2022-03-14 NOTE — PROGRESS NOTES
Subjective     Lilliam Portillo is a 61 y.o. female, who presents with a chief complaint of   Chief Complaint   Patient presents with   • Depression   • Allergic Rhinitis   You have chosen to receive care through a telehealth visit.  Do you consent to use a video/audio connection for your medical care today? Yes    Diabetes    DepressionPatient presents with the following symptoms: insomnia.    Rash  Her past medical history is significant for allergies.   Insomnia  Associated symptoms include a rash.   Allergies  Associated symptoms include a rash.      1. Depression.  Takes bupropion and fluoxetine. She reports her symptoms are adequately controlled.    2. Insomnia.  Trazodone helps. Hasn't been needing to use this much recently since she retired.    3. Allergic rhinitis.  Reports symptoms are controlled with nasal steroid.  She is still undergoing immunotherapy with her ENT.    4. Atrophic vaginitis.  She is doing well with Vagifem.    5. Diabetes.  She is taking Ozempic 0.5 mg weekly and is tolerating it.  Denies hypoglycemia.  Had eye exam this past year.    6. Lumbar DDD.  She had another surgery 1/3/2022 by Dr. Montes.  She is seeing pain management tomorrow due to persistent radicular pain.    The following portions of the patient's history were reviewed and updated as appropriate: allergies, current medications, past family history, past medical history, past social history, past surgical history and problem list.    Allergies: Adhesive tape and Contrast dye    Review of Systems   Constitutional: Negative.    HENT: Negative.    Eyes: Negative.    Respiratory: Negative.    Cardiovascular: Negative.    Gastrointestinal: Negative.    Endocrine: Negative.    Genitourinary: Negative.    Musculoskeletal: Positive for back pain.   Skin: Positive for rash.   Allergic/Immunologic: Positive for environmental allergies.   Hematological: Negative.    Psychiatric/Behavioral: Negative for sleep disturbance. The patient  has insomnia.        Objective     Wt Readings from Last 3 Encounters:   01/19/22 81.2 kg (179 lb)   09/09/21 81 kg (178 lb 9.6 oz)   03/04/21 83.5 kg (184 lb)     Temp Readings from Last 3 Encounters:   01/19/22 97.8 °F (36.6 °C)   09/09/21 96.8 °F (36 °C)   03/04/21 96.8 °F (36 °C) (Temporal)     BP Readings from Last 3 Encounters:   01/19/22 115/65   09/09/21 110/70   03/04/21 118/72     Pulse Readings from Last 3 Encounters:   01/19/22 72   09/09/21 79   03/04/21 79     There is no height or weight on file to calculate BMI.    Physical Exam   Constitutional: She is oriented to person, place, and time. She appears well-developed.   HENT:   Head: Normocephalic and atraumatic.   Mouth/Throat: Mucous membranes are moist.   Eyes: Conjunctivae are normal.   Neck: No thyromegaly present.   Pulmonary/Chest: Effort normal. No respiratory distress.   Abdominal: Normal appearance.   Neurological: She is alert and oriented to person, place, and time.   Skin: No erythema. No pallor.   Psychiatric: Her behavior is normal. Mood normal.   Nursing note and vitals reviewed.    Assessment/Plan   Diagnoses and all orders for this visit:    1. Depression, unspecified depression type (Primary)  -     CBC & Differential; Future  -     TSH; Future    2. Insomnia, unspecified type    3. Allergic rhinitis, unspecified seasonality, unspecified trigger    4. Type 2 diabetes mellitus treated without insulin ([facility])  -     Comprehensive Metabolic Panel; Future  -     Vitamin B12; Future  -     Hemoglobin A1c; Future  -     Lipid Panel With / Chol / HDL Ratio; Future  -     Microalbumin / Creatinine Urine Ratio - Urine, Clean Catch; Future    5. History of lumbar surgery    6. Atrophic vaginitis    7. Routine health maintenance  -     Vitamin D 25 Hydroxy; Future  -     Mammo Screening Bilateral With CAD; Future    1. Depression.  Controlled.  Continue bupropion and fluoxetine.    2. Insomnia.  Benefiting from prn trazodone and lifestyle  "measures.  Continue same.    3. Allergic rhinitis. Continue nasal steroid and immunotherapy.    4. Atrophic vaginitis.  Doing well with Vagifem.  Mammogram due and is ordered.    5. Diabetes.  A1c 6.0, down from 6.2.  Continue Ozempic 0.5 mg daily and lifestyle measures. On atorvastatin 10 mg daily.  On ASA.    6. History of lumbar surgery.  1/2022 by Dr. Matt.  Seeing pain management tomorrow due to persistent radicular pain.    7. Routine health maint.  Tdap UTD.  Doesn't need a pap smear.  Mammogram ordered.  Colonoscopy due 11/2026.  Shingrix done at pharmacy.  Covid-19 vaccines done.        Current Outpatient Medications:   •  albuterol (PROVENTIL) (2.5 MG/3ML) 0.083% nebulizer solution, Take 2.5 mg by nebulization Every 4 (Four) Hours As Needed for Wheezing or Shortness of Air., Disp: , Rfl:   •  ALLERGY SERUM INJECTION, Inject  under the skin into the appropriate area as directed 2 (Two) Times a Week., Disp: , Rfl:   •  aspirin 81 MG EC tablet, Take 81 mg by mouth Daily., Disp: , Rfl:   •  atorvastatin (LIPITOR) 10 MG tablet, Take 1 tablet by mouth Daily., Disp: 90 tablet, Rfl: 1  •  B-D INS SYR MICROFINE 1CC/28G 28G X 1/2\" 1 ML misc, , Disp: , Rfl:   •  Biotin 300 MCG tablet, Take 1 tablet by mouth Daily., Disp: , Rfl:   •  buPROPion XL (WELLBUTRIN XL) 300 MG 24 hr tablet, Take 1 tablet by mouth Daily., Disp: 90 tablet, Rfl: 1  •  celecoxib (CeleBREX) 200 MG capsule, Take 1 capsule by mouth Daily., Disp: 30 capsule, Rfl: 5  •  cholestyramine (QUESTRAN) 4 g packet, , Disp: , Rfl:   •  clobetasol prop emollient base (TEMOVATE) 0.05 % emollient cream, Apply to affected areas twice daily, Disp: 15 g, Rfl: 0  •  EPINEPHrine (EPIPEN) 0.3 MG/0.3ML solution auto-injector injection, , Disp: , Rfl:   •  estradiol (VAGIFEM) 10 MCG tablet vaginal tablet, Insert 1 tablet into the vagina 2 (Two) Times a Week., Disp: 8 tablet, Rfl: 5  •  FLUoxetine (PROzac) 20 MG capsule, Take 1 capsule by mouth Every Night., Disp: 90 " capsule, Rfl: 1  •  Insulin Pen Needle (BD Pen Needle Leta U/F) 32G X 4 MM misc, Use daily with Victoza pen., Disp: 100 each, Rfl: 2  •  montelukast (SINGULAIR) 10 MG tablet, Take 1 tablet by mouth Every Night., Disp: 90 tablet, Rfl: 1  •  Multiple Vitamin (MULTI VITAMIN) tablet, Take 1 tablet by mouth Daily., Disp: , Rfl:   •  Semaglutide,0.25 or 0.5MG/DOS, (OZEMPIC) 2 MG/1.5ML solution pen-injector, Inject 0.5 mg under the skin into the appropriate area as directed 1 (One) Time Per Week. Start with 0.25 mg weekly for 4 weeks, then 0.5 mg weekly thereafter., Disp: 1 pen, Rfl: 5  •  tiZANidine (ZANAFLEX) 4 MG tablet, Take 1 tablet by mouth At Night As Needed for Muscle Spasms., Disp: 30 tablet, Rfl: 0  •  traZODone (DESYREL) 50 MG tablet, Take 1 tablet by mouth At Night As Needed for Sleep., Disp: 90 tablet, Rfl: 1  •  triamcinolone (KENALOG) 0.025 % ointment, Apply  topically to the appropriate area as directed 2 (Two) Times a Day., Disp: 80 g, Rfl: 1  Medications Discontinued During This Encounter   Medication Reason   • dicyclomine (BENTYL) 20 MG tablet    • GRALISE 600 MG tablet tablet    • Nutritional Supplements (DHEA PO)        Return in about 6 months (around 9/14/2022).

## 2022-03-14 NOTE — TELEPHONE ENCOUNTER
[facility] ATTEMPTED TO WARM TRANSFER TO THE OFFICE AND WAS UNSUCCESSFUL    Caller: Lilliam Portillo    Relationship to patient: Self    Best call back number: [phone] (H)    Type of visit: VIRTUAL VISIT NEEDED     Requested date: TODAY 03/14/22     If rescheduling, when is the original appointment: 03/14/22 10:15 AM     Additional notes:    PATIENT CALLED IN TO TRY AND GET HER AND HER HUSBANDS APPOINTMENTS FOR TODAY, CHANGED TO A VIRTUAL VISIT.     THEY WOULD LIKE TO KEEP TODAY'S DATE AND TIMES FOR THEIR APPOINTMENTS.     [facility] WAS UNABLE TO MAKE THIS CHANGE AND KEEP TODAY'S DATE AND TIME.   PLEASE CALL AND ADVISE

## 2022-05-13 DIAGNOSIS — E11.9 TYPE 2 DIABETES MELLITUS TREATED WITHOUT INSULIN: ICD-10-CM

## 2022-05-13 RX ORDER — MONTELUKAST SODIUM 10 MG/1
10 TABLET ORAL NIGHTLY
Qty: 90 TABLET | Refills: 1 | Status: SHIPPED | OUTPATIENT
Start: 2022-05-13 | End: 2022-11-16 | Stop reason: SDUPTHER

## 2022-05-13 RX ORDER — ATORVASTATIN CALCIUM 10 MG/1
10 TABLET, FILM COATED ORAL DAILY
Qty: 90 TABLET | Refills: 1 | Status: SHIPPED | OUTPATIENT
Start: 2022-05-13 | End: 2022-11-16 | Stop reason: SDUPTHER

## 2022-05-16 RX ORDER — TRAZODONE HYDROCHLORIDE 50 MG/1
50 TABLET ORAL NIGHTLY PRN
Qty: 90 TABLET | Refills: 1 | Status: SHIPPED | OUTPATIENT
Start: 2022-05-16 | End: 2022-11-16 | Stop reason: SDUPTHER

## 2022-05-16 RX ORDER — FLUOXETINE HYDROCHLORIDE 20 MG/1
20 CAPSULE ORAL NIGHTLY
Qty: 90 CAPSULE | Refills: 1 | Status: SHIPPED | OUTPATIENT
Start: 2022-05-16 | End: 2022-11-16 | Stop reason: SDUPTHER

## 2022-05-16 RX ORDER — BUPROPION HYDROCHLORIDE 300 MG/1
300 TABLET ORAL DAILY
Qty: 90 TABLET | Refills: 1 | Status: SHIPPED | OUTPATIENT
Start: 2022-05-16 | End: 2022-11-17 | Stop reason: SDUPTHER

## 2022-08-03 ENCOUNTER — TELEPHONE (OUTPATIENT)
Dept: INTERNAL MEDICINE | Facility: CLINIC | Age: 62
End: 2022-08-03

## 2022-08-03 NOTE — TELEPHONE ENCOUNTER
I called the patient to remind her of the order that was placed for her to get her Mammogram. She asked for a copy of the order be mailed to her, I confirmed addressed and printed copy of order out and mailed to the patient.

## 2022-08-11 DIAGNOSIS — E11.9 TYPE 2 DIABETES MELLITUS TREATED WITHOUT INSULIN: ICD-10-CM

## 2022-08-11 NOTE — TELEPHONE ENCOUNTER
Rx Refill Note  Requested Prescriptions     Pending Prescriptions Disp Refills   • Semaglutide,0.25 or 0.5MG/DOS, (OZEMPIC) 2 MG/1.5ML solution pen-injector 1 pen 5     Sig: Inject 0.5 mg under the skin into the appropriate area as directed 1 (One) Time Per Week. Start with 0.25 mg weekly for 4 weeks, then 0.5 mg weekly thereafter.      Last office visit with prescribing clinician: 1/19/2022      Next office visit with prescribing clinician: 9/19/2022            SAAD HANEY MA  08/11/22, 11:41 EDT

## 2022-08-24 DIAGNOSIS — E11.9 TYPE 2 DIABETES MELLITUS TREATED WITHOUT INSULIN: ICD-10-CM

## 2022-08-25 RX ORDER — PEN NEEDLE, DIABETIC 32GX 5/32"
NEEDLE, DISPOSABLE MISCELLANEOUS
Qty: 100 EACH | Refills: 2 | Status: SHIPPED | OUTPATIENT
Start: 2022-08-25

## 2022-08-31 ENCOUNTER — TELEPHONE (OUTPATIENT)
Dept: INTERNAL MEDICINE | Facility: CLINIC | Age: 62
End: 2022-08-31

## 2022-08-31 NOTE — TELEPHONE ENCOUNTER
Caller: Lilliam Portillo    Relationship: Self    Best call back number: [phone]    What medication are you requesting: ANTIBIOTIC EYEDROPS    What are your current symptoms: RED RIGHT EYE, SORENESS, DISCHARGE    How long have you been experiencing symptoms: ABOUT 2 DAYS    Have you had these symptoms before:    [x] Yes  [] No    Have you been treated for these symptoms before:   [x] Yes  [] No    If a prescription is needed, what is your preferred pharmacy and phone number: [id number] [place] [id number] AT [facility] [address] - [phone] [facility] [phone] [facility]    Additional notes: PATIENT REQUESTING A NEW PRESCRIPTION, SHE BELIEVES SHE HAS CONJUNCTIVITIS.

## 2022-08-31 NOTE — TELEPHONE ENCOUNTER
There are some other things that this could be so she needs to get this checked in person.  She lives in [place] so she might want to go to urgent care.

## 2022-09-19 ENCOUNTER — OFFICE VISIT (OUTPATIENT)
Dept: INTERNAL MEDICINE | Facility: CLINIC | Age: 62
End: 2022-09-19

## 2022-09-19 VITALS
SYSTOLIC BLOOD PRESSURE: 120 MMHG | HEART RATE: 78 BPM | HEIGHT: 62 IN | WEIGHT: 147.8 LBS | DIASTOLIC BLOOD PRESSURE: 64 MMHG | BODY MASS INDEX: 27.2 KG/M2 | TEMPERATURE: 97.7 F | OXYGEN SATURATION: 98 %

## 2022-09-19 DIAGNOSIS — N95.2 ATROPHIC VAGINITIS: ICD-10-CM

## 2022-09-19 DIAGNOSIS — G47.00 INSOMNIA, UNSPECIFIED TYPE: ICD-10-CM

## 2022-09-19 DIAGNOSIS — E55.9 HYPOVITAMINOSIS D: ICD-10-CM

## 2022-09-19 DIAGNOSIS — J30.9 ALLERGIC RHINITIS, UNSPECIFIED SEASONALITY, UNSPECIFIED TRIGGER: ICD-10-CM

## 2022-09-19 DIAGNOSIS — F32.A DEPRESSION, UNSPECIFIED DEPRESSION TYPE: Primary | ICD-10-CM

## 2022-09-19 DIAGNOSIS — E11.9 TYPE 2 DIABETES MELLITUS TREATED WITHOUT INSULIN: ICD-10-CM

## 2022-09-19 DIAGNOSIS — Z00.00 ROUTINE HEALTH MAINTENANCE: ICD-10-CM

## 2022-09-19 DIAGNOSIS — Z98.890 HISTORY OF LUMBAR SURGERY: ICD-10-CM

## 2022-09-19 PROBLEM — K58.0 IRRITABLE BOWEL SYNDROME WITH DIARRHEA: Status: ACTIVE | Noted: 2022-09-19

## 2022-09-19 PROCEDURE — 99214 OFFICE O/P EST MOD 30 MIN: CPT | Performed by: FAMILY MEDICINE

## 2022-09-19 RX ORDER — ESTRADIOL 10 UG/1
1 INSERT VAGINAL 2 TIMES WEEKLY
Qty: 8 TABLET | Refills: 5 | Status: SHIPPED | OUTPATIENT
Start: 2022-09-19 | End: 2022-11-29 | Stop reason: SDUPTHER

## 2022-09-19 RX ORDER — DICYCLOMINE HYDROCHLORIDE 10 MG/1
10 CAPSULE ORAL 2 TIMES DAILY
COMMUNITY

## 2022-09-19 RX ORDER — ALBUTEROL SULFATE 90 UG/1
2 AEROSOL, METERED RESPIRATORY (INHALATION) EVERY 4 HOURS PRN
COMMUNITY
End: 2022-09-19 | Stop reason: SDUPTHER

## 2022-09-19 RX ORDER — ALBUTEROL SULFATE 90 UG/1
2 AEROSOL, METERED RESPIRATORY (INHALATION) EVERY 4 HOURS PRN
Qty: 18 G | Refills: 1 | Status: SHIPPED | OUTPATIENT
Start: 2022-09-19

## 2022-09-19 NOTE — PROGRESS NOTES
Subjective     Lilliam Portillo is a 62 y.o. female, who presents with a chief complaint of   Chief Complaint   Patient presents with   • Diabetes     6 mo f/u       Diabetes    DepressionPatient presents with the following symptoms: insomnia.    Rash  Her past medical history is significant for allergies.   Insomnia  Associated symptoms include a rash.   Allergies  Associated symptoms include a rash.      1. Depression.  Takes bupropion and fluoxetine. She reports her symptoms are adequately controlled.    2. Insomnia.  Trazodone helps. Hasn't been needing to use this much recently since she retired.    3. Allergic rhinitis.  Reports symptoms are controlled with nasal steroid.  She is no longer undergoing immunotherapy with her ENT.    4. Atrophic vaginitis.  She is doing well with Vagifem.    5. Diabetes.  She is taking Ozempic 0.5 mg weekly and is tolerating it.  Denies hypoglycemia.  Had eye exam this past year.  She is down 32 pounds since January.    6. Lumbar DDD.  She has another surgery pending tomorrow by Dr. Montes.  She is having L SI fusion.  May have right SI fusion in 3-4 months.    7. Irritable bowel syndrome.  She is now taking dicyclomine and seeing Dr. Bullock, gastroenterologist.    The following portions of the patient's history were reviewed and updated as appropriate: allergies, current medications, past family history, past medical history, past social history, past surgical history and problem list.    Allergies: Adhesive tape and Contrast dye    Review of Systems   Constitutional: Negative.    HENT: Negative.    Eyes: Negative.    Respiratory: Negative.    Cardiovascular: Negative.    Gastrointestinal: Negative.    Endocrine: Negative.    Genitourinary: Negative.    Musculoskeletal: Positive for back pain.   Skin: Positive for rash.   Allergic/Immunologic: Positive for environmental allergies.   Hematological: Negative.    Psychiatric/Behavioral: Negative for sleep disturbance. The patient has  insomnia.        Objective     Wt Readings from Last 3 Encounters:   09/19/22 67 kg (147 lb 12.8 oz)   01/19/22 81.2 kg (179 lb)   09/09/21 81 kg (178 lb 9.6 oz)     Temp Readings from Last 3 Encounters:   09/19/22 97.7 °F (36.5 °C)   01/19/22 97.8 °F (36.6 °C)   09/09/21 96.8 °F (36 °C)     BP Readings from Last 3 Encounters:   09/19/22 120/64   01/19/22 115/65   09/09/21 110/70     Pulse Readings from Last 3 Encounters:   09/19/22 78   01/19/22 72   09/09/21 79     Body mass index is 27.03 kg/m².    Physical Exam   Constitutional: She is oriented to person, place, and time. She appears well-developed.   HENT:   Head: Normocephalic and atraumatic.   Mouth/Throat: Mucous membranes are moist.   Eyes: Conjunctivae are normal.   Neck: No thyromegaly present.   Cardiovascular: Normal rate, regular rhythm and normal heart sounds.   Pulmonary/Chest: Effort normal and breath sounds normal.   Abdominal: Soft. Normal appearance. There is no abdominal tenderness.   Neurological: She is alert and oriented to person, place, and time.   Skin: No erythema. No pallor.   Psychiatric: Her behavior is normal. Mood normal.   Nursing note and vitals reviewed.    Assessment/Plan   Diagnoses and all orders for this visit:    1. Depression, unspecified depression type (Primary)  -     TSH; Future    2. Insomnia, unspecified type    3. Allergic rhinitis, unspecified seasonality, unspecified trigger    4. Atrophic vaginitis    5. Type 2 diabetes mellitus treated without insulin ([facility])  -     Comprehensive Metabolic Panel; Future  -     Hemoglobin A1c; Future  -     Lipid Panel With / Chol / HDL Ratio; Future  -     Vitamin B12; Future    6. History of lumbar surgery    7. Routine health maintenance  -     CBC & Differential; Future    8. Hypovitaminosis D  -     Vitamin D 25 Hydroxy; Future    Other orders  -     estradiol (VAGIFEM) 10 MCG tablet vaginal tablet; Insert 1 tablet into the vagina 2 (Two) Times a Week.  Dispense: 8 tablet;  "Refill: 5  -     albuterol sulfate  (90 Base) MCG/ACT inhaler; Inhale 2 puffs Every 4 (Four) Hours As Needed for Wheezing or Shortness of Air.  Dispense: 18 g; Refill: 1    1. Depression.  Controlled.  Continue bupropion and fluoxetine.    2. Insomnia.  Benefiting from prn trazodone and lifestyle measures.  Continue same.    3. Allergic rhinitis. Continue nasal steroid.  No longer on immunotherapy.    4. Atrophic vaginitis.  Doing well with Vagifem.  Mammogram done last month (8/29/2022).    5. Diabetes.  A1c 5.6, down from 6.0.  Continue Ozempic 0.5 mg daily and lifestyle measures. On atorvastatin 10 mg daily.  On ASA.    6. History of lumbar surgery.  1/2022 by Dr. Matt.  Has another surgery scheduled tomorrow, L SI joint fusion.    7. Routine health maint.  Tdap UTD.  Doesn't need a pap smear.  Mammogram UTD.  Colonoscopy due 11/2026.  Shingrix done at pharmacy.  Covid-19 vaccines done.  Declines flu vaccine today.        Current Outpatient Medications:   •  albuterol (PROVENTIL) (2.5 MG/3ML) 0.083% nebulizer solution, Take 2.5 mg by nebulization Every 4 (Four) Hours As Needed for Wheezing or Shortness of Air., Disp: , Rfl:   •  albuterol sulfate  (90 Base) MCG/ACT inhaler, Inhale 2 puffs Every 4 (Four) Hours As Needed for Wheezing or Shortness of Air., Disp: 18 g, Rfl: 1  •  atorvastatin (LIPITOR) 10 MG tablet, Take 1 tablet by mouth Daily., Disp: 90 tablet, Rfl: 1  •  B-D INS SYR MICROFINE 1CC/28G 28G X 1/2\" 1 ML misc, , Disp: , Rfl:   •  Biotin 300 MCG tablet, Take 1 tablet by mouth Daily., Disp: , Rfl:   •  buPROPion XL (WELLBUTRIN XL) 300 MG 24 hr tablet, Take 1 tablet by mouth Daily., Disp: 90 tablet, Rfl: 1  •  celecoxib (CeleBREX) 200 MG capsule, Take 1 capsule by mouth Daily., Disp: 30 capsule, Rfl: 5  •  clobetasol prop emollient base (TEMOVATE) 0.05 % emollient cream, Apply to affected areas twice daily, Disp: 15 g, Rfl: 0  •  dicyclomine (BENTYL) 10 MG capsule, Take 10 mg by mouth 2 " (Two) Times a Day., Disp: , Rfl:   •  estradiol (VAGIFEM) 10 MCG tablet vaginal tablet, Insert 1 tablet into the vagina 2 (Two) Times a Week., Disp: 8 tablet, Rfl: 5  •  FLUoxetine (PROzac) 20 MG capsule, Take 1 capsule by mouth Every Night., Disp: 90 capsule, Rfl: 1  •  Insulin Pen Needle (BD Pen Needle Leta U/F) 32G X 4 MM misc, Use daily with Victoza pen., Disp: 100 each, Rfl: 2  •  montelukast (SINGULAIR) 10 MG tablet, Take 1 tablet by mouth Every Night., Disp: 90 tablet, Rfl: 1  •  Multiple Vitamin (MULTI VITAMIN) tablet, Take 1 tablet by mouth Daily., Disp: , Rfl:   •  Semaglutide,0.25 or 0.5MG/DOS, (OZEMPIC) 2 MG/1.5ML solution pen-injector, Inject 0.5 mg under the skin into the appropriate area as directed 1 (One) Time Per Week. Start with 0.25 mg weekly for 4 weeks, then 0.5 mg weekly thereafter., Disp: 1 pen, Rfl: 5  •  tiZANidine (ZANAFLEX) 4 MG tablet, Take 1 tablet by mouth At Night As Needed for Muscle Spasms., Disp: 30 tablet, Rfl: 0  •  traZODone (DESYREL) 50 MG tablet, Take 1 tablet by mouth At Night As Needed for Sleep., Disp: 90 tablet, Rfl: 1  •  EPINEPHrine (EPIPEN) 0.3 MG/0.3ML solution auto-injector injection, , Disp: , Rfl:   •  triamcinolone (KENALOG) 0.025 % ointment, Apply  topically to the appropriate area as directed 2 (Two) Times a Day., Disp: 80 g, Rfl: 1  Medications Discontinued During This Encounter   Medication Reason   • ALLERGY SERUM INJECTION    • aspirin 81 MG EC tablet *Therapy completed   • cholestyramine (QUESTRAN) 4 g packet    • estradiol (VAGIFEM) 10 MCG tablet vaginal tablet Reorder   • albuterol sulfate  (90 Base) MCG/ACT inhaler Reorder       Return in about 6 months (around 3/19/2023).

## 2022-09-27 DIAGNOSIS — Z98.890 HISTORY OF LUMBAR SURGERY: ICD-10-CM

## 2022-09-27 NOTE — TELEPHONE ENCOUNTER
Rx Refill Note  Requested Prescriptions     Pending Prescriptions Disp Refills   • celecoxib (CeleBREX) 200 MG capsule 30 capsule 5     Sig: Take 1 capsule by mouth Daily.      Last office visit with prescribing clinician: 9/19/2022      Next office visit with prescribing clinician: 3/20/2023            Orquidea Campa MA  09/27/22, 10:00 EDT

## 2022-09-28 RX ORDER — CELECOXIB 200 MG/1
200 CAPSULE ORAL DAILY
Qty: 90 CAPSULE | Refills: 1 | Status: SHIPPED | OUTPATIENT
Start: 2022-09-28 | End: 2023-03-20

## 2022-11-16 DIAGNOSIS — E11.9 TYPE 2 DIABETES MELLITUS TREATED WITHOUT INSULIN: ICD-10-CM

## 2022-11-16 RX ORDER — MONTELUKAST SODIUM 10 MG/1
10 TABLET ORAL NIGHTLY
Qty: 90 TABLET | Refills: 1 | Status: SHIPPED | OUTPATIENT
Start: 2022-11-16

## 2022-11-16 RX ORDER — ATORVASTATIN CALCIUM 10 MG/1
10 TABLET, FILM COATED ORAL DAILY
Qty: 90 TABLET | Refills: 1 | Status: SHIPPED | OUTPATIENT
Start: 2022-11-16

## 2022-11-16 RX ORDER — FLUOXETINE HYDROCHLORIDE 20 MG/1
20 CAPSULE ORAL NIGHTLY
Qty: 90 CAPSULE | Refills: 1 | Status: SHIPPED | OUTPATIENT
Start: 2022-11-16

## 2022-11-16 RX ORDER — TRAZODONE HYDROCHLORIDE 50 MG/1
50 TABLET ORAL NIGHTLY PRN
Qty: 90 TABLET | Refills: 1 | Status: SHIPPED | OUTPATIENT
Start: 2022-11-16

## 2022-11-16 NOTE — TELEPHONE ENCOUNTER
Rx Refill Note  Requested Prescriptions     Pending Prescriptions Disp Refills   • FLUoxetine (PROzac) 20 MG capsule 90 capsule 1     Sig: Take 1 capsule by mouth Every Night.   • montelukast (SINGULAIR) 10 MG tablet 90 tablet 1     Sig: Take 1 tablet by mouth Every Night.   • traZODone (DESYREL) 50 MG tablet 90 tablet 1     Sig: Take 1 tablet by mouth At Night As Needed for Sleep.   • atorvastatin (LIPITOR) 10 MG tablet 90 tablet 1     Sig: Take 1 tablet by mouth Daily.      Last office visit with prescribing clinician: 9/19/2022      Next office visit with prescribing clinician: 3/20/2023            Giacomo Crow  11/16/22, 13:35 EST

## 2022-11-17 RX ORDER — BUPROPION HYDROCHLORIDE 300 MG/1
300 TABLET ORAL DAILY
Qty: 90 TABLET | Refills: 1 | Status: SHIPPED | OUTPATIENT
Start: 2022-11-17

## 2022-11-17 NOTE — TELEPHONE ENCOUNTER
Rx Refill Note  Requested Prescriptions     Pending Prescriptions Disp Refills   • buPROPion XL (WELLBUTRIN XL) 300 MG 24 hr tablet 90 tablet 1     Sig: Take 1 tablet by mouth Daily.      Last office visit with prescribing clinician: 9/19/2022      Next office visit with prescribing clinician: 3/20/2023            Aminah Beltran MA  11/17/22, 09:24 EST

## 2022-11-29 RX ORDER — ESTRADIOL 10 UG/1
1 INSERT VAGINAL 2 TIMES WEEKLY
Qty: 8 TABLET | Refills: 5 | Status: SHIPPED | OUTPATIENT
Start: 2022-12-01

## 2022-11-29 NOTE — TELEPHONE ENCOUNTER
Rx Refill Note  Requested Prescriptions     Pending Prescriptions Disp Refills   • estradiol (VAGIFEM) 10 MCG tablet vaginal tablet 8 tablet 5     Sig: Insert 1 tablet into the vagina 2 (Two) Times a Week.      Last office visit with prescribing clinician: 9/19/2022   Last telemedicine visit with prescribing clinician: Visit date not found   Next office visit with prescribing clinician: 3/20/2023   {TIP  Encounters:23}                      Would you like a call back once the refill request has been completed: [] Yes [] No    If the office needs to give you a call back, can they leave a voicemail: [] Yes [] No    Aminah Beltran MA  11/29/22, 10:30 EST

## 2022-12-03 ENCOUNTER — TELEMEDICINE (OUTPATIENT)
Dept: FAMILY MEDICINE CLINIC | Facility: TELEHEALTH | Age: 62
End: 2022-12-03

## 2022-12-03 VITALS — WEIGHT: 147 LBS | HEIGHT: 62 IN | BODY MASS INDEX: 27.05 KG/M2

## 2022-12-03 DIAGNOSIS — R07.81 RIB PAIN: ICD-10-CM

## 2022-12-03 DIAGNOSIS — J40 BRONCHITIS: Primary | ICD-10-CM

## 2022-12-03 PROCEDURE — 99213 OFFICE O/P EST LOW 20 MIN: CPT | Performed by: NURSE PRACTITIONER

## 2022-12-03 RX ORDER — DEXTROMETHORPHAN HYDROBROMIDE AND PROMETHAZINE HYDROCHLORIDE 15; 6.25 MG/5ML; MG/5ML
5 SYRUP ORAL NIGHTLY PRN
Qty: 118 ML | Refills: 0 | Status: SHIPPED | OUTPATIENT
Start: 2022-12-03 | End: 2023-03-20

## 2022-12-03 RX ORDER — PREDNISONE 20 MG/1
20 TABLET ORAL 2 TIMES DAILY
Qty: 14 TABLET | Refills: 0 | Status: SHIPPED | OUTPATIENT
Start: 2022-12-03 | End: 2022-12-10
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
## 2023-01-04 ENCOUNTER — TELEPHONE (OUTPATIENT)
Dept: INTERNAL MEDICINE | Facility: CLINIC | Age: 63
End: 2023-01-04
Payer: COMMERCIAL

## 2023-01-04 DIAGNOSIS — R07.9 CHEST PAIN, UNSPECIFIED TYPE: Primary | ICD-10-CM

## 2023-01-04 NOTE — TELEPHONE ENCOUNTER
Pt calls wanting new referral for cardiology at . She wants to est with [facility] not happy with current heart DR. Can you place a new referral for her please? Thank you

## 2023-03-20 ENCOUNTER — OFFICE VISIT (OUTPATIENT)
Dept: INTERNAL MEDICINE | Facility: CLINIC | Age: 63
End: 2023-03-20
Payer: COMMERCIAL

## 2023-03-20 VITALS
DIASTOLIC BLOOD PRESSURE: 68 MMHG | SYSTOLIC BLOOD PRESSURE: 120 MMHG | HEIGHT: 62 IN | OXYGEN SATURATION: 95 % | BODY MASS INDEX: 28.23 KG/M2 | HEART RATE: 74 BPM | WEIGHT: 153.4 LBS | TEMPERATURE: 97.7 F

## 2023-03-20 DIAGNOSIS — E11.9 TYPE 2 DIABETES MELLITUS TREATED WITHOUT INSULIN: ICD-10-CM

## 2023-03-20 DIAGNOSIS — J30.9 ALLERGIC RHINITIS, UNSPECIFIED SEASONALITY, UNSPECIFIED TRIGGER: ICD-10-CM

## 2023-03-20 DIAGNOSIS — N95.2 ATROPHIC VAGINITIS: ICD-10-CM

## 2023-03-20 DIAGNOSIS — Z98.890 HISTORY OF LUMBAR SURGERY: ICD-10-CM

## 2023-03-20 DIAGNOSIS — G47.00 INSOMNIA, UNSPECIFIED TYPE: Primary | ICD-10-CM

## 2023-03-20 DIAGNOSIS — Z00.00 ROUTINE HEALTH MAINTENANCE: ICD-10-CM

## 2023-03-20 DIAGNOSIS — E55.9 HYPOVITAMINOSIS D: ICD-10-CM

## 2023-03-20 PROCEDURE — 99214 OFFICE O/P EST MOD 30 MIN: CPT | Performed by: FAMILY MEDICINE

## 2023-03-20 RX ORDER — COLESEVELAM 180 1/1
1 TABLET ORAL 2 TIMES DAILY
COMMUNITY
Start: 2022-10-27

## 2023-03-20 RX ORDER — MELOXICAM 15 MG/1
15 TABLET ORAL DAILY
Qty: 90 TABLET | Refills: 1 | Status: SHIPPED | OUTPATIENT
Start: 2023-03-20

## 2023-03-20 RX ORDER — IPRATROPIUM BROMIDE 21 UG/1
2 SPRAY, METERED NASAL 3 TIMES DAILY
COMMUNITY
Start: 2022-10-26

## 2023-03-20 RX ORDER — LOSARTAN POTASSIUM 25 MG/1
25 TABLET ORAL DAILY
Qty: 30 TABLET | Refills: 11 | COMMUNITY
Start: 2023-02-20 | End: 2024-02-20

## 2023-03-20 RX ORDER — PYRAZINAMIDE 500 MG/1
1 TABLET ORAL
COMMUNITY
Start: 2022-10-30

## 2023-03-20 RX ORDER — LIFITEGRAST 50 MG/ML
1 SOLUTION/ DROPS OPHTHALMIC 2 TIMES DAILY
COMMUNITY
Start: 2023-03-15

## 2023-03-20 NOTE — PROGRESS NOTES
Subjective     Lilliam Portillo is a 62 y.o. female, who presents with a chief complaint of   Chief Complaint   Patient presents with   • Depression     6 month follow up   • Diabetes   • Med Refill     Tizanidine, Kenalog ointment, Estradiol       Diabetes    DepressionPatient presents with the following symptoms: insomnia.    Insomnia    Allergies    1. Depression.  Takes bupropion and fluoxetine. She reports her symptoms are adequately controlled.    2. Irritable bowel syndrome.  She is now taking dicyclomine and seeing Dr. Bullock, gastroenterologist.    3. Allergic rhinitis.  Reports symptoms are controlled with nasal steroid.  She is no longer undergoing immunotherapy with her ENT.    4. Atrophic vaginitis.  She is doing well with Vagifem.    5. Diabetes.  She is taking Ozempic 0.5 mg weekly and is tolerating it.  Denies hypoglycemia.  Had eye exam this past year.      The following portions of the patient's history were reviewed and updated as appropriate: allergies, current medications, past family history, past medical history, past social history, past surgical history and problem list.    Allergies: Adhesive tape and Contrast dye (echo or unknown ct/mr)    Review of Systems   Constitutional: Negative.    HENT: Negative.    Eyes: Negative.    Respiratory: Negative.    Cardiovascular: Negative.    Gastrointestinal: Negative.    Endocrine: Negative.    Genitourinary: Negative.    Musculoskeletal: Positive for back pain.   Allergic/Immunologic: Positive for environmental allergies.   Hematological: Negative.    Psychiatric/Behavioral: Negative for sleep disturbance. The patient has insomnia.        Objective     Wt Readings from Last 3 Encounters:   03/20/23 69.6 kg (153 lb 6.4 oz)   12/03/22 66.7 kg (147 lb)   09/19/22 67 kg (147 lb 12.8 oz)     Temp Readings from Last 3 Encounters:   03/20/23 97.7 °F (36.5 °C)   09/19/22 97.7 °F (36.5 °C)   01/19/22 97.8 °F (36.6 °C)     BP Readings from Last 3 Encounters:    03/20/23 120/68   09/19/22 120/64   01/19/22 115/65     Pulse Readings from Last 3 Encounters:   03/20/23 74   09/19/22 78   01/19/22 72     Body mass index is 28.06 kg/m².    Physical Exam   Constitutional: She is oriented to person, place, and time. She appears well-developed.   HENT:   Head: Normocephalic and atraumatic.   Mouth/Throat: Mucous membranes are moist.   Eyes: Conjunctivae are normal.   Neck: No thyromegaly present.   Cardiovascular: Normal rate, regular rhythm and normal heart sounds.   Pulmonary/Chest: Effort normal and breath sounds normal.   Abdominal: Soft. Normal appearance. There is no abdominal tenderness.   Musculoskeletal:      Right lower leg: No edema.      Left lower leg: No edema.   Neurological: She is alert and oriented to person, place, and time.   Skin: Skin is warm and dry.   Psychiatric: Her behavior is normal. Mood normal.   Nursing note and vitals reviewed.    Assessment/Plan   Diagnoses and all orders for this visit:    1. Insomnia, unspecified type (Primary)    2. Allergic rhinitis, unspecified seasonality, unspecified trigger    3. Atrophic vaginitis    4. Type 2 diabetes mellitus treated without insulin (HCC)  -     Comprehensive Metabolic Panel; Future  -     Hemoglobin A1c; Future  -     Vitamin B12; Future  -     Semaglutide,0.25 or 0.5MG/DOS, (OZEMPIC) 2 MG/1.5ML solution pen-injector; Inject 0.5 mg under the skin into the appropriate area as directed 1 (One) Time Per Week.  Dispense: 4.5 mL; Refill: 3    5. History of lumbar surgery  -     meloxicam (MOBIC) 15 MG tablet; Take 1 tablet by mouth Daily.  Dispense: 90 tablet; Refill: 1    6. Routine health maintenance  -     Lipid Panel With / Chol / HDL Ratio; Future  -     TSH; Future  -     CBC Auto Differential; Future    7. Hypovitaminosis D  -     Vitamin D,25-Hydroxy; Future    1. Depression.  Controlled.  Continue bupropion and fluoxetine.    2. Insomnia.  Benefiting from prn trazodone and lifestyle measures.   "Continue same.    3. Allergic rhinitis.  Continue nasal steroid.  No longer on immunotherapy.    4. Atrophic vaginitis.  Doing well with Vagifem.  Mammogram done last month (8/29/2022).    5. Diabetes.  A1c 5.4, down from 5.6.  Continue Ozempic 0.5 mg daily and lifestyle measures. On atorvastatin 10 mg daily.  On ASA.    6. History of lumbar surgery.  Doing well s/p surgery 1/2022 and 9/2022 by Dr. Matt.     7. Routine health maint.  Tdap UTD.  Doesn't need a pap smear.  Mammogram done last summer. Colonoscopy due 11/2026.  Shingrix done at pharmacy.  Covid-19 vaccines done.        Current Outpatient Medications:   •  acetaminophen-codeine (TYLENOL with CODEINE #3) 300-30 MG per tablet, Take 1 tablet by mouth., Disp: , Rfl:   •  albuterol sulfate  (90 Base) MCG/ACT inhaler, Inhale 2 puffs Every 4 (Four) Hours As Needed for Wheezing or Shortness of Air., Disp: 18 g, Rfl: 1  •  atorvastatin (LIPITOR) 10 MG tablet, Take 1 tablet by mouth Daily., Disp: 90 tablet, Rfl: 1  •  B-D INS SYR MICROFINE 1CC/28G 28G X 1/2\" 1 ML misc, , Disp: , Rfl:   •  Biotin 300 MCG tablet, Take 1 tablet by mouth Daily., Disp: , Rfl:   •  buPROPion XL (WELLBUTRIN XL) 300 MG 24 hr tablet, Take 1 tablet by mouth Daily., Disp: 90 tablet, Rfl: 1  •  colesevelam (WELCHOL) 625 MG tablet, Take 1 tablet by mouth 2 (Two) Times a Day., Disp: , Rfl:   •  dicyclomine (BENTYL) 10 MG capsule, Take 1 capsule by mouth 2 (Two) Times a Day., Disp: , Rfl:   •  EPINEPHrine (EPIPEN) 0.3 MG/0.3ML solution auto-injector injection, , Disp: , Rfl:   •  estradiol (VAGIFEM) 10 MCG tablet vaginal tablet, Insert 1 tablet into the vagina 2 (Two) Times a Week., Disp: 8 tablet, Rfl: 5  •  FLUoxetine (PROzac) 20 MG capsule, Take 1 capsule by mouth Every Night., Disp: 90 capsule, Rfl: 1  •  Insulin Pen Needle (BD Pen Needle Leta U/F) 32G X 4 MM misc, Use daily with Victoza pen., Disp: 100 each, Rfl: 2  •  ipratropium (ATROVENT) 0.03 % nasal spray, 2 sprays into the " nostril(s) as directed by provider 3 (Three) Times a Day., Disp: , Rfl:   •  losartan (COZAAR) 25 MG tablet, Take 1 tablet by mouth Daily., Disp: 30 tablet, Rfl: 11  •  montelukast (SINGULAIR) 10 MG tablet, Take 1 tablet by mouth Every Night., Disp: 90 tablet, Rfl: 1  •  Multiple Vitamin (MULTI VITAMIN) tablet, Take 1 tablet by mouth Daily., Disp: , Rfl:   •  Semaglutide,0.25 or 0.5MG/DOS, (OZEMPIC) 2 MG/1.5ML solution pen-injector, Inject 0.5 mg under the skin into the appropriate area as directed 1 (One) Time Per Week., Disp: 4.5 mL, Rfl: 3  •  traZODone (DESYREL) 50 MG tablet, Take 1 tablet by mouth At Night As Needed for Sleep., Disp: 90 tablet, Rfl: 1  •  Xiidra 5 % ophthalmic solution, Apply 1 drop to eye(s) as directed by provider 2 (Two) Times a Day., Disp: , Rfl:   •  albuterol (PROVENTIL) (2.5 MG/3ML) 0.083% nebulizer solution, Take 2.5 mg by nebulization Every 4 (Four) Hours As Needed for Wheezing or Shortness of Air. (Patient not taking: Reported on 3/20/2023), Disp: , Rfl:   •  clobetasol prop emollient base (TEMOVATE) 0.05 % emollient cream, Apply to affected areas twice daily (Patient not taking: Reported on 3/20/2023), Disp: 15 g, Rfl: 0  •  meloxicam (MOBIC) 15 MG tablet, Take 1 tablet by mouth Daily., Disp: 90 tablet, Rfl: 1  Medications Discontinued During This Encounter   Medication Reason   • promethazine-dextromethorphan (PROMETHAZINE-DM) 6.25-15 MG/5ML syrup *Therapy completed   • tiZANidine (ZANAFLEX) 4 MG tablet    • triamcinolone (KENALOG) 0.025 % ointment    • Semaglutide,0.25 or 0.5MG/DOS, (OZEMPIC) 2 MG/1.5ML solution pen-injector Reorder   • celecoxib (CeleBREX) 200 MG capsule        Return in about 6 months (around 9/20/2023).

## 2023-03-29 ENCOUNTER — TELEPHONE (OUTPATIENT)
Dept: INTERNAL MEDICINE | Facility: CLINIC | Age: 63
End: 2023-03-29
Payer: COMMERCIAL

## 2023-05-15 ENCOUNTER — TELEMEDICINE (OUTPATIENT)
Dept: INTERNAL MEDICINE | Facility: CLINIC | Age: 63
End: 2023-05-15
Payer: COMMERCIAL

## 2023-05-15 VITALS — WEIGHT: 145 LBS | BODY MASS INDEX: 26.68 KG/M2 | HEIGHT: 62 IN

## 2023-05-15 DIAGNOSIS — R23.2 HOT FLASHES: Primary | ICD-10-CM

## 2023-05-15 PROCEDURE — 99213 OFFICE O/P EST LOW 20 MIN: CPT | Performed by: FAMILY MEDICINE

## 2023-05-15 RX ORDER — VENLAFAXINE HYDROCHLORIDE 75 MG/1
75 CAPSULE, EXTENDED RELEASE ORAL DAILY
Qty: 30 CAPSULE | Refills: 5 | Status: SHIPPED | OUTPATIENT
Start: 2023-05-15

## 2023-05-15 RX ORDER — NITROGLYCERIN 0.4 MG/1
0.4 TABLET SUBLINGUAL
COMMUNITY

## 2023-05-15 NOTE — PROGRESS NOTES
"Subjective   Lilliam Portillo is a 62 y.o. female presenting today for follow up of   Chief Complaint   Patient presents with   • Hot Flashes     You have chosen to receive care through a telehealth visit.  Do you consent to use a video/audio connection for your medical care today? Yes  Total visit time 15 minutes.    Pt location: Home in [place], [place].  My location: Office in [place], [place].    History of Present Illness     Pt presents to discuss hot flashes X a few months, 2-3 times daily.  Worse in the evenings.  She describes the episodes as \"an inferno.\"  She feels her body get hot and she starts to sweat and it eases up after a few minutes.  The episodes occasionally during the night.  She doesn't have to change the sheets or her clothes.      She had a hysterectomy at age 39.  She believes that she had bilateral oophorectomy at that time as well due to endometriosis.  She had no menopausal symptoms then.  Last mammogram was reportedly last summer but we do not have the report.    She has had no recent medication changes.  She intentionally lost weight last summer.  No other new symptoms.    Patient Active Problem List   Diagnosis   • Atopic rhinitis   • Degeneration of intervertebral disc of cervical region   • Degeneration of intervertebral disc of lumbar region   • Depression   • Type 2 diabetes mellitus treated without insulin   • Insomnia   • Atrophic vaginitis   • Pulmonary nodules   • History of diabetes mellitus resolved following bariatric surgery   • History of bariatric surgery   • Diverticulosis   • Skull defect   • Lumbar radiculopathy, acute   • Spinal stenosis, lumbar region, with neurogenic claudication   • Routine health maintenance   • History of lumbar surgery   • Irritable bowel syndrome with diarrhea       Current Outpatient Medications on File Prior to Visit   Medication Sig   • acetaminophen-codeine (TYLENOL with CODEINE #3) 300-30 MG per tablet Take 1 tablet by mouth.   • albuterol " "(PROVENTIL) (2.5 MG/3ML) 0.083% nebulizer solution Take 2.5 mg by nebulization Every 4 (Four) Hours As Needed for Wheezing or Shortness of Air.   • albuterol sulfate  (90 Base) MCG/ACT inhaler Inhale 2 puffs Every 4 (Four) Hours As Needed for Wheezing or Shortness of Air.   • atorvastatin (LIPITOR) 10 MG tablet Take 1 tablet by mouth Daily.   • B-D INS SYR MICROFINE 1CC/28G 28G X 1/2\" 1 ML misc    • Biotin 300 MCG tablet Take 1 tablet by mouth Daily.   • buPROPion XL (WELLBUTRIN XL) 300 MG 24 hr tablet Take 1 tablet by mouth Daily.   • colesevelam (WELCHOL) 625 MG tablet Take 1 tablet by mouth 2 (Two) Times a Day.   • dicyclomine (BENTYL) 10 MG capsule Take 1 capsule by mouth 2 (Two) Times a Day.   • EPINEPHrine (EPIPEN) 0.3 MG/0.3ML solution auto-injector injection    • estradiol (VAGIFEM) 10 MCG tablet vaginal tablet Insert 1 tablet into the vagina 2 (Two) Times a Week.   • Insulin Pen Needle (BD Pen Needle Leta U/F) 32G X 4 MM misc Use daily with Victoza pen.   • ipratropium (ATROVENT) 0.03 % nasal spray 2 sprays into the nostril(s) as directed by provider 3 (Three) Times a Day.   • losartan (COZAAR) 25 MG tablet Take 1 tablet by mouth Daily.   • meloxicam (MOBIC) 15 MG tablet Take 1 tablet by mouth Daily.   • Multiple Vitamin (MULTI VITAMIN) tablet Take 1 tablet by mouth Daily.   • nitroglycerin (NITROSTAT) 0.4 MG SL tablet 1 tablet.   • Semaglutide,0.25 or 0.5MG/DOS, (OZEMPIC) 2 MG/1.5ML solution pen-injector Inject 0.5 mg under the skin into the appropriate area as directed 1 (One) Time Per Week.   • traZODone (DESYREL) 50 MG tablet Take 1 tablet by mouth At Night As Needed for Sleep.   • Xiidra 5 % ophthalmic solution Apply 1 drop to eye(s) as directed by provider 2 (Two) Times a Day.   • [DISCONTINUED] FLUoxetine (PROzac) 20 MG capsule Take 1 capsule by mouth Every Night.   • [DISCONTINUED] clobetasol prop emollient base (TEMOVATE) 0.05 % emollient cream Apply to affected areas twice daily (Patient " "not taking: Reported on 3/20/2023)   • [DISCONTINUED] montelukast (SINGULAIR) 10 MG tablet Take 1 tablet by mouth Every Night. (Patient not taking: Reported on 5/15/2023)     No current facility-administered medications on file prior to visit.          The following portions of the patient's history were reviewed and updated as appropriate: allergies, current medications, past family history, past medical history, past social history, past surgical history and problem list.    Review of Systems   Constitutional: Negative.  Negative for unexpected weight gain and unexpected weight loss.   Gastrointestinal:        No change.   Endocrine: Positive for heat intolerance.   Neurological: Negative for tremors.       Objective   Vitals:    05/15/23 0903   Weight: 65.8 kg (145 lb)   Height: 157.5 cm (62\")       BP Readings from Last 3 Encounters:   03/20/23 120/68   09/19/22 120/64   01/19/22 115/65        Wt Readings from Last 3 Encounters:   05/15/23 65.8 kg (145 lb)   03/20/23 69.6 kg (153 lb 6.4 oz)   12/03/22 66.7 kg (147 lb)        Body mass index is 26.52 kg/m².  Nursing notes and vitals reviewed.    Physical Exam  Constitutional:       Appearance: Normal appearance.   HENT:      Head: Normocephalic and atraumatic.      Mouth/Throat:      Mouth: Mucous membranes are moist.   Eyes:      Extraocular Movements: Extraocular movements intact.      Conjunctiva/sclera: Conjunctivae normal.   Pulmonary:      Effort: Pulmonary effort is normal. No respiratory distress.   Musculoskeletal:      Cervical back: Neck supple. No rigidity.   Skin:     Coloration: Skin is not pale.      Findings: No erythema.   Neurological:      General: No focal deficit present.      Mental Status: She is alert and oriented to person, place, and time.   Psychiatric:         Mood and Affect: Mood normal.         Behavior: Behavior normal.         No results found for this or any previous visit (from the past 672 hour(s)).      Assessment & Plan "   Diagnoses and all orders for this visit:    1. Hot flashes (Primary)    Other orders  -     venlafaxine XR (Effexor XR) 75 MG 24 hr capsule; Take 1 capsule by mouth Daily.  Dispense: 30 capsule; Refill: 5      Her description is quite consistent with menopausal hot flashes.  I suspect she may not have had the oophorectomy at age 39 with the hysterectomy but I'm not able to confirm.  Options discussed.  Wean off fluoxetine.  Start venlafaxine.  Anticipatory guidance given.  She is on estradiol vaginal tablet for atrophic vaginitis.        Medications, including side effects, were discussed with the patient. Patient verbalized understanding.  The plan of care was discussed. All questions were answered. Patient verbalized understanding.      Return for Next scheduled follow up.

## 2023-05-16 DIAGNOSIS — E11.9 TYPE 2 DIABETES MELLITUS TREATED WITHOUT INSULIN: ICD-10-CM

## 2023-05-16 NOTE — TELEPHONE ENCOUNTER
Rx Refill Note  Requested Prescriptions     Pending Prescriptions Disp Refills   • atorvastatin (LIPITOR) 10 MG tablet 90 tablet 1     Sig: Take 1 tablet by mouth Daily.   • buPROPion XL (WELLBUTRIN XL) 300 MG 24 hr tablet 90 tablet 1     Sig: Take 1 tablet by mouth Daily.      Last office visit with prescribing clinician: 3/20/2023   Last telemedicine visit with prescribing clinician: 3/29/2023   Next office visit with prescribing clinician: 9/21/2023                         Would you like a call back once the refill request has been completed: [] Yes [] No    If the office needs to give you a call back, can they leave a voicemail: [] Yes [] No    Orquidea Campa MA  05/16/23, 15:27 EDT

## 2023-05-17 RX ORDER — ATORVASTATIN CALCIUM 10 MG/1
10 TABLET, FILM COATED ORAL DAILY
Qty: 90 TABLET | Refills: 1 | Status: SHIPPED | OUTPATIENT
Start: 2023-05-17

## 2023-05-17 RX ORDER — BUPROPION HYDROCHLORIDE 300 MG/1
300 TABLET ORAL DAILY
Qty: 90 TABLET | Refills: 1 | Status: SHIPPED | OUTPATIENT
Start: 2023-05-17

## 2023-07-26 DIAGNOSIS — E11.9 TYPE 2 DIABETES MELLITUS TREATED WITHOUT INSULIN: ICD-10-CM

## 2023-07-26 NOTE — TELEPHONE ENCOUNTER
Rx Refill Note  Requested Prescriptions     Pending Prescriptions Disp Refills    Semaglutide,0.25 or 0.5MG/DOS, (OZEMPIC) 2 MG/1.5ML solution pen-injector 4.5 mL 3     Sig: Inject 0.5 mg under the skin into the appropriate area as directed 1 (One) Time Per Week.      Last office visit with prescribing clinician: 3/20/2023   Last telemedicine visit with prescribing clinician: 5/15/2023   Next office visit with prescribing clinician: 9/21/2023                         Would you like a call back once the refill request has been completed: [] Yes [] No    If the office needs to give you a call back, can they leave a voicemail: [] Yes [] No    Orquidea Campa MA  07/26/23, 16:09 EDT

## 2023-08-10 ENCOUNTER — TELEPHONE (OUTPATIENT)
Dept: INTERNAL MEDICINE | Facility: CLINIC | Age: 63
End: 2023-08-10
Payer: COMMERCIAL

## 2023-08-11 DIAGNOSIS — Z98.890 HISTORY OF LUMBAR SURGERY: ICD-10-CM

## 2023-08-11 RX ORDER — MELOXICAM 15 MG/1
15 TABLET ORAL DAILY
Qty: 90 TABLET | Refills: 1 | Status: SHIPPED | OUTPATIENT
Start: 2023-08-11

## 2023-08-11 NOTE — TELEPHONE ENCOUNTER
Rx Refill Note  Requested Prescriptions     Pending Prescriptions Disp Refills    meloxicam (MOBIC) 15 MG tablet 90 tablet 1     Sig: Take 1 tablet by mouth Daily.      Last office visit with prescribing clinician: 3/20/2023   Last telemedicine visit with prescribing clinician: 5/15/2023   Next office visit with prescribing clinician: 9/21/2023                         Would you like a call back once the refill request has been completed: [] Yes [] No    If the office needs to give you a call back, can they leave a voicemail: [] Yes [] No    Ericka Barraza, PCT  08/11/23, 15:41 EDT

## 2023-09-21 ENCOUNTER — OFFICE VISIT (OUTPATIENT)
Dept: INTERNAL MEDICINE | Facility: CLINIC | Age: 63
End: 2023-09-21
Payer: COMMERCIAL

## 2023-09-21 VITALS
HEART RATE: 78 BPM | DIASTOLIC BLOOD PRESSURE: 82 MMHG | SYSTOLIC BLOOD PRESSURE: 142 MMHG | BODY MASS INDEX: 28.23 KG/M2 | HEIGHT: 62 IN | TEMPERATURE: 98.4 F | WEIGHT: 153.4 LBS | OXYGEN SATURATION: 97 %

## 2023-09-21 DIAGNOSIS — R10.13 EPIGASTRIC PAIN: ICD-10-CM

## 2023-09-21 DIAGNOSIS — J30.9 ALLERGIC RHINITIS, UNSPECIFIED SEASONALITY, UNSPECIFIED TRIGGER: ICD-10-CM

## 2023-09-21 DIAGNOSIS — G47.00 INSOMNIA, UNSPECIFIED TYPE: ICD-10-CM

## 2023-09-21 DIAGNOSIS — J40 BRONCHITIS: ICD-10-CM

## 2023-09-21 DIAGNOSIS — Z98.890 HISTORY OF LUMBAR SURGERY: ICD-10-CM

## 2023-09-21 DIAGNOSIS — N95.2 ATROPHIC VAGINITIS: ICD-10-CM

## 2023-09-21 DIAGNOSIS — F32.A DEPRESSION, UNSPECIFIED DEPRESSION TYPE: Primary | ICD-10-CM

## 2023-09-21 DIAGNOSIS — E11.9 TYPE 2 DIABETES MELLITUS TREATED WITHOUT INSULIN: ICD-10-CM

## 2023-09-21 DIAGNOSIS — Z00.00 ROUTINE HEALTH MAINTENANCE: ICD-10-CM

## 2023-09-21 LAB
EXPIRATION DATE: NORMAL
INTERNAL CONTROL: NORMAL
Lab: NORMAL
SARS-COV-2 AG UPPER RESP QL IA.RAPID: NOT DETECTED

## 2023-09-21 RX ORDER — AZITHROMYCIN 250 MG/1
TABLET, FILM COATED ORAL
Qty: 6 TABLET | Refills: 0 | Status: SHIPPED | OUTPATIENT
Start: 2023-09-21

## 2023-09-21 RX ORDER — OMEPRAZOLE 40 MG/1
40 CAPSULE, DELAYED RELEASE ORAL DAILY
Qty: 90 CAPSULE | Refills: 1 | Status: SHIPPED | OUTPATIENT
Start: 2023-09-21

## 2023-09-21 RX ORDER — BENZONATATE 100 MG/1
100 CAPSULE ORAL ONCE AS NEEDED
COMMUNITY
Start: 2023-09-08

## 2023-09-21 RX ORDER — NALOXONE HYDROCHLORIDE 4 MG/.1ML
SPRAY NASAL
COMMUNITY
Start: 2023-08-18

## 2023-09-21 RX ORDER — HYDROCODONE BITARTRATE AND ACETAMINOPHEN 5; 325 MG/1; MG/1
TABLET ORAL
COMMUNITY
Start: 2023-08-18

## 2023-09-21 NOTE — PROGRESS NOTES
Subjective     Lilliam Portillo is a 63 y.o. female, who presents with a chief complaint of   Chief Complaint   Patient presents with    Diabetes     F/U       DepressionPatient presents with the following symptoms: insomnia.      Diabetes    Insomnia  Associated symptoms include congestion, coughing and a sore throat. Pertinent negatives include no fever.   Allergies  Associated symptoms include congestion, coughing and a sore throat. Pertinent negatives include no fever.     1. Depression.  Takes bupropion and venlafaxine. She reports her symptoms are adequately controlled.  We switched from fluoxetine to venlafaxine last time due to hot flashes.    2. Irritable bowel syndrome.  She stopped taking dicyclomine and Welchol.  She has seen Dr. Bullock, gastroenterologist.  She has stomach pain in the morning when she eats    3. Allergic rhinitis.  Reports symptoms are controlled with nasal steroid.  She is no longer undergoing immunotherapy with her ENT.    4. Atrophic vaginitis.  She is doing well with Vagifem.    5. Diabetes.  She is taking Ozempic 0.5 mg weekly and is tolerating it.  Denies hypoglycemia.  Had eye exam this past year.      6. Cough and congestion.  She had Covid-19 three weeks ago and was symptom free for a day or two until 5 days ago when she developed cough and scratchy throat again.    The following portions of the patient's history were reviewed and updated as appropriate: allergies, current medications, past family history, past medical history, past social history, past surgical history and problem list.    Allergies: Adhesive tape, Ambrosia artemisiifolia (ragweed) skin test, Iodinated contrast media, and Contrast dye (echo or unknown ct/mr)    Review of Systems   Constitutional: Negative.  Negative for fever.   HENT:  Positive for congestion and sore throat.    Eyes: Negative.    Respiratory:  Positive for cough.    Cardiovascular: Negative.    Gastrointestinal: Negative.    Endocrine: Negative.     Genitourinary: Negative.    Musculoskeletal:  Positive for back pain.   Allergic/Immunologic: Positive for environmental allergies.   Hematological: Negative.    Psychiatric/Behavioral:  Negative for sleep disturbance. The patient has insomnia.      Objective     Wt Readings from Last 3 Encounters:   09/21/23 69.6 kg (153 lb 6.4 oz)   05/15/23 65.8 kg (145 lb)   03/20/23 69.6 kg (153 lb 6.4 oz)     Temp Readings from Last 3 Encounters:   09/21/23 98.4 °F (36.9 °C) (Infrared)   03/20/23 97.7 °F (36.5 °C)   09/19/22 97.7 °F (36.5 °C)     BP Readings from Last 3 Encounters:   09/21/23 142/82   03/20/23 120/68   09/19/22 120/64     Pulse Readings from Last 3 Encounters:   09/21/23 78   03/20/23 74   09/19/22 78     Body mass index is 28.06 kg/m².    Physical Exam   Constitutional: She is oriented to person, place, and time. She appears well-developed.   HENT:   Head: Normocephalic and atraumatic.   Mouth/Throat: Mucous membranes are moist.   Eyes: Conjunctivae are normal.   Neck: No thyromegaly present.   Cardiovascular: Normal rate, regular rhythm and normal heart sounds.   Pulmonary/Chest: Effort normal and breath sounds normal.   Abdominal: Soft. Normal appearance. There is no abdominal tenderness.   Musculoskeletal:      Right lower leg: No edema.      Left lower leg: No edema.   Neurological: She is alert and oriented to person, place, and time.   Skin: Skin is warm and dry.   Psychiatric: Her behavior is normal. Mood normal.   Nursing note and vitals reviewed.  Assessment/Plan   Diagnoses and all orders for this visit:    1. Depression, unspecified depression type (Primary)    2. Bronchitis  -     POCT SARS-CoV-2 Antigen BOONE  -     azithromycin (ZITHROMAX) 250 MG tablet; Take 2 tablets the first day, then 1 tablet daily for 4 days.  Dispense: 6 tablet; Refill: 0  -     dextromethorphan 15 MG/5ML syrup; Take 10 mL by mouth 4 (Four) Times a Day As Needed for Cough.  Dispense: 118 mL; Refill: 0    3. Insomnia,  unspecified type    4. Allergic rhinitis, unspecified seasonality, unspecified trigger    5. Atrophic vaginitis    6. Type 2 diabetes mellitus treated without insulin  -     Comprehensive Metabolic Panel; Future  -     Hemoglobin A1c; Future  -     Lipid Panel With / Chol / HDL Ratio; Future  -     Vitamin B12; Future    7. History of lumbar surgery    8. Routine health maintenance  -     Mammo screening digital tomosynthesis bilateral w CAD; Future  -     TSH; Future  -     Vitamin D,25-Hydroxy; Future  -     CBC Auto Differential; Future    9. Epigastric pain  -     omeprazole (priLOSEC) 40 MG capsule; Take 1 capsule by mouth Daily.  Dispense: 90 capsule; Refill: 1    1. Depression.  Hot flashes.  Controlled.  Continue bupropion and venlafaxine.    2. Insomnia.  Benefiting from prn trazodone and lifestyle measures.  Continue same.    3. Allergic rhinitis.  Continue nasal steroid.  No longer on immunotherapy.    4. Atrophic vaginitis.  Doing well with Vagifem.  Mammogram done last month (8/29/2022).    5. Diabetes.  A1c 5.9, up from 5.4.  Continue Ozempic 0.5 mg daily and lifestyle measures. On atorvastatin 10 mg daily.  On ASA.    6. History of lumbar surgery.  Doing well s/p surgery 1/2022 and 9/2022 by Dr. Matt.     7. Routine health maint.  Tdap UTD.  Doesn't need a pap smear.  Mammogram done last summer. Colonoscopy due 11/2026.  Shingrix done at pharmacy.  Covid-19 vaccines done.      8. Epigastric pain.  Suspect gastritis.  Add omeprazole.    9. Bronchitis.  Treat with azithromycin.      Current Outpatient Medications:     acetaminophen-codeine (TYLENOL with CODEINE #3) 300-30 MG per tablet, Take 1 tablet by mouth., Disp: , Rfl:     albuterol (PROVENTIL) (2.5 MG/3ML) 0.083% nebulizer solution, Take 2.5 mg by nebulization Every 4 (Four) Hours As Needed for Wheezing or Shortness of Air., Disp: , Rfl:     albuterol sulfate  (90 Base) MCG/ACT inhaler, Inhale 2 puffs Every 4 (Four) Hours As Needed for  "Wheezing or Shortness of Air., Disp: 18 g, Rfl: 1    atorvastatin (LIPITOR) 10 MG tablet, Take 1 tablet by mouth Daily., Disp: 90 tablet, Rfl: 1    B-D INS SYR MICROFINE 1CC/28G 28G X 1/2\" 1 ML misc, , Disp: , Rfl:     benzonatate (TESSALON) 100 MG capsule, Take 1 capsule by mouth 1 (One) Time As Needed., Disp: , Rfl:     Biotin 300 MCG tablet, Take 1 tablet by mouth Daily., Disp: , Rfl:     buPROPion XL (WELLBUTRIN XL) 300 MG 24 hr tablet, Take 1 tablet by mouth Daily., Disp: 90 tablet, Rfl: 1    EPINEPHrine (EPIPEN) 0.3 MG/0.3ML solution auto-injector injection, , Disp: , Rfl:     estradiol (VAGIFEM) 10 MCG tablet vaginal tablet, Insert 1 tablet into the vagina 2 (Two) Times a Week., Disp: 8 tablet, Rfl: 5    Insulin Pen Needle (BD Pen Needle Leta U/F) 32G X 4 MM misc, Use daily with Victoza pen., Disp: 100 each, Rfl: 2    ipratropium (ATROVENT) 0.03 % nasal spray, 2 sprays into the nostril(s) as directed by provider 3 (Three) Times a Day., Disp: , Rfl:     meloxicam (MOBIC) 15 MG tablet, Take 1 tablet by mouth Daily., Disp: 90 tablet, Rfl: 1    Multiple Vitamin (MULTI VITAMIN) tablet, Take 1 tablet by mouth Daily., Disp: , Rfl:     naloxone (NARCAN) 4 MG/0.1ML nasal spray, , Disp: , Rfl:     nitroglycerin (NITROSTAT) 0.4 MG SL tablet, 1 tablet., Disp: , Rfl:     Semaglutide,0.25 or 0.5MG/DOS, (OZEMPIC) 2 MG/1.5ML solution pen-injector, Inject 0.5 mg under the skin into the appropriate area as directed 1 (One) Time Per Week., Disp: 4.5 mL, Rfl: 3    traZODone (DESYREL) 50 MG tablet, Take 1 tablet by mouth At Night As Needed for Sleep., Disp: 90 tablet, Rfl: 1    venlafaxine XR (Effexor XR) 75 MG 24 hr capsule, Take 1 capsule by mouth Daily., Disp: 30 capsule, Rfl: 5    azithromycin (ZITHROMAX) 250 MG tablet, Take 2 tablets the first day, then 1 tablet daily for 4 days., Disp: 6 tablet, Rfl: 0    dextromethorphan 15 MG/5ML syrup, Take 10 mL by mouth 4 (Four) Times a Day As Needed for Cough., Disp: 118 mL, Rfl: 0    " HYDROcodone-acetaminophen (NORCO) 5-325 MG per tablet, , Disp: , Rfl:     omeprazole (priLOSEC) 40 MG capsule, Take 1 capsule by mouth Daily., Disp: 90 capsule, Rfl: 1  Medications Discontinued During This Encounter   Medication Reason    Xiidra 5 % ophthalmic solution     losartan (COZAAR) 25 MG tablet     dicyclomine (BENTYL) 10 MG capsule     colesevelam (WELCHOL) 625 MG tablet          Return in about 6 months (around 3/21/2024).

## 2023-11-14 RX ORDER — VENLAFAXINE HYDROCHLORIDE 75 MG/1
75 CAPSULE, EXTENDED RELEASE ORAL DAILY
Qty: 30 CAPSULE | Refills: 5 | Status: SHIPPED | OUTPATIENT
Start: 2023-11-14

## 2023-11-15 DIAGNOSIS — E11.9 TYPE 2 DIABETES MELLITUS TREATED WITHOUT INSULIN: ICD-10-CM

## 2023-11-15 RX ORDER — ATORVASTATIN CALCIUM 10 MG/1
10 TABLET, FILM COATED ORAL DAILY
Qty: 90 TABLET | Refills: 1 | Status: SHIPPED | OUTPATIENT
Start: 2023-11-15

## 2023-11-15 NOTE — TELEPHONE ENCOUNTER
Rx Refill Note  Requested Prescriptions     Pending Prescriptions Disp Refills    atorvastatin (LIPITOR) 10 MG tablet 90 tablet 1     Sig: Take 1 tablet by mouth Daily.      Last office visit with prescribing clinician: 9/21/2023   Last telemedicine visit with prescribing clinician: Visit date not found   Next office visit with prescribing clinician: 3/21/2024                         Would you like a call back once the refill request has been completed: [] Yes [] No    If the office needs to give you a call back, can they leave a voicemail: [] Yes [] No    Orquidea Campa MA  11/15/23, 15:10 EST

## 2023-11-21 ENCOUNTER — READMISSION MANAGEMENT (OUTPATIENT)
Dept: CALL CENTER | Facility: HOSPITAL | Age: 63
End: 2023-11-21
Payer: COMMERCIAL

## 2023-11-21 NOTE — OUTREACH NOTE
Prep Survey      Flowsheet Row Responses   [facility] facility patient discharged from? Non-[facility]   Is LACE score < 7 ? Non-BH Discharge   Eligibility [facility]   [facility]   Date of Admission 11/15/23   Date of Discharge 11/20/23   Discharge Disposition Home or Self Care   Discharge diagnosis LUMBAR 1-3 LATERAL INTERBODY FUSION, LAMINECTOMY L1-3, L1-3 POSTERIOR LUMBAR DECOMPRESSION AND FUSION,   Does the patient have one of the following disease processes/diagnoses(primary or secondary)? General Surgery   Prep survey completed? Yes            Denisse Ovalle Registered Nurse

## 2023-11-22 ENCOUNTER — TRANSITIONAL CARE MANAGEMENT TELEPHONE ENCOUNTER (OUTPATIENT)
Dept: CALL CENTER | Facility: HOSPITAL | Age: 63
End: 2023-11-22
Payer: COMMERCIAL

## 2023-11-22 ENCOUNTER — TELEPHONE (OUTPATIENT)
Dept: INTERNAL MEDICINE | Facility: CLINIC | Age: 63
End: 2023-11-22
Payer: COMMERCIAL

## 2023-11-22 NOTE — OUTREACH NOTE
Call Center TCM Note      Flowsheet Row Responses   [facility] patient discharged from? Non-   Does the patient have one of the following disease processes/diagnoses(primary or secondary)? General Surgery   TCM attempt successful? Yes   Call start time 1011   Call end time 1017   Discharge diagnosis LUMBAR 1-3 LATERAL INTERBODY FUSION, LAMINECTOMY L1-3, L1-3 POSTERIOR LUMBAR DECOMPRESSION AND FUSION,   Meds reviewed with patient/caregiver? Yes   Is the patient having any side effects they believe may be caused by any medication additions or changes? No   Does the patient have all medications ordered at discharge? Yes   Is the patient taking all medications as directed (includes completed medication regime)? Yes   Comments Hospital d/c f/u appt on 11/30/23 @2:30pm   Does the patient have an appointment with their PCP within 7-14 days of discharge? Yes   Has home health visited the patient within 72 hours of discharge? N/A   Psychosocial issues? No   Did the patient receive a copy of their discharge instructions? Yes   Nursing interventions Reviewed instructions with patient   What is the patient's perception of their health status since discharge? Improving  [under left breast pain/discomfort when inhaling, gladis upper thighs discomfort. Pt will f/u with Dr if worsens or no improvement]   Is the patient/caregiver able to teach back the hierarchy of who to call/visit for symptoms/problems? PCP, Specialist, Home health nurse, Urgent Care, ED, 911 Yes   TCM call completed? Yes   Call end time 1017   Would this patient benefit from a Referral to Amb Social Work? No   Is the patient interested in additional calls from an ambulatory ? No            Nini Knight RN    11/22/2023, 10:18 EST

## 2023-11-22 NOTE — TELEPHONE ENCOUNTER
PATIENT CALLED ASKING IF SHE CAN CHANGE HER HOSPITAL FOLLOW UP VISIT TO A MYCHART VISIT. PLEASE ADVISE SO THAT WE CAN CALL THE PATIENT BACK.

## 2023-11-29 RX ORDER — BUPROPION HYDROCHLORIDE 300 MG/1
300 TABLET ORAL DAILY
Qty: 90 TABLET | Refills: 1 | Status: SHIPPED | OUTPATIENT
Start: 2023-11-29

## 2023-11-30 ENCOUNTER — TELEMEDICINE (OUTPATIENT)
Dept: INTERNAL MEDICINE | Facility: CLINIC | Age: 63
End: 2023-11-30
Payer: COMMERCIAL

## 2023-11-30 VITALS — WEIGHT: 140 LBS | BODY MASS INDEX: 25.76 KG/M2 | HEIGHT: 62 IN

## 2023-11-30 DIAGNOSIS — E87.6 HYPOKALEMIA: ICD-10-CM

## 2023-11-30 DIAGNOSIS — I26.93 SINGLE SUBSEGMENTAL PULMONARY EMBOLISM WITHOUT ACUTE COR PULMONALE: Primary | ICD-10-CM

## 2023-11-30 DIAGNOSIS — D50.9 IRON DEFICIENCY ANEMIA, UNSPECIFIED IRON DEFICIENCY ANEMIA TYPE: ICD-10-CM

## 2023-11-30 RX ORDER — OXYCODONE HYDROCHLORIDE 5 MG/1
5 TABLET ORAL
COMMUNITY
Start: 2023-11-17

## 2023-11-30 NOTE — PROGRESS NOTES
Subjective   Lilliam Portillo is a 63 y.o. female presenting today for follow up of   Chief Complaint   Patient presents with    Hospital Follow Up Visit    Pulmonary Embolism    Post-op Problem     You have chosen to receive care through a telehealth visit.  Do you consent to use a video/audio connection for your medical care today? Yes  Pt is at her home in [place], [place].  I am at my office in [place], [place]  Total visit time 25 minutes.   History of Present Illness     Pt presents for hospital follow-up.  She was hospitalized 11/15-11/20/2023 for L1-L3 and SI joint fusion by Dr. Antwan Matt  Post operative course was complicated by small left pneumothorax and hypokalemia.  Both of these issues resolved/improved by the time of discharge.  She has f/u with Dr. Matt next week.    I week after discharge, on 11/27/2023, pt presented to the ER with dizziness and malaise. She was treated with IVF and antibiotics for dehydration and UTI.  She was found to have a small right pulmonary embolism and was started on Eliquis.  She has f/u with pulmonology next week.    She reports that she is slowly improving.  Still feels tired and has altered taste.  She is trying to walk around.  Need for narcotic pain medication is slowly decreasing.    Patient Active Problem List   Diagnosis    Atopic rhinitis    Degeneration of intervertebral disc of cervical region    Degeneration of intervertebral disc of lumbar region    Depression    Type 2 diabetes mellitus treated without insulin    Insomnia    Atrophic vaginitis    Pulmonary nodules    History of diabetes mellitus resolved following bariatric surgery    History of bariatric surgery    Diverticulosis    Skull defect    Lumbar radiculopathy, acute    Spinal stenosis, lumbar region, with neurogenic claudication    Routine health maintenance    History of lumbar surgery    Irritable bowel syndrome with diarrhea       Current Outpatient Medications on File Prior to Visit  "  Medication Sig    albuterol (PROVENTIL) (2.5 MG/3ML) 0.083% nebulizer solution Take 2.5 mg by nebulization Every 4 (Four) Hours As Needed for Wheezing or Shortness of Air.    albuterol sulfate  (90 Base) MCG/ACT inhaler Inhale 2 puffs Every 4 (Four) Hours As Needed for Wheezing or Shortness of Air.    apixaban (ELIQUIS) 5 MG tablet tablet Take 1 tablet by mouth.    atorvastatin (LIPITOR) 10 MG tablet Take 1 tablet by mouth Daily.    B-D INS SYR MICROFINE 1CC/28G 28G X 1/2\" 1 ML misc     benzonatate (TESSALON) 100 MG capsule Take 1 capsule by mouth 1 (One) Time As Needed.    Biotin 300 MCG tablet Take 1 tablet by mouth Daily.    buPROPion XL (WELLBUTRIN XL) 300 MG 24 hr tablet Take 1 tablet by mouth Daily.    dextromethorphan 15 MG/5ML syrup Take 10 mL by mouth 4 (Four) Times a Day As Needed for Cough.    EPINEPHrine (EPIPEN) 0.3 MG/0.3ML solution auto-injector injection     HYDROcodone-acetaminophen (NORCO) 5-325 MG per tablet     Insulin Pen Needle (BD Pen Needle Leta U/F) 32G X 4 MM misc Use daily with Victoza pen.    ipratropium (ATROVENT) 0.03 % nasal spray 2 sprays into the nostril(s) as directed by provider 3 (Three) Times a Day.    Multiple Vitamin (MULTI VITAMIN) tablet Take 1 tablet by mouth Daily.    nitroglycerin (NITROSTAT) 0.4 MG SL tablet 1 tablet.    omeprazole (priLOSEC) 40 MG capsule Take 1 capsule by mouth Daily.    oxyCODONE (ROXICODONE) 5 MG immediate release tablet Take 1 tablet by mouth.    traZODone (DESYREL) 50 MG tablet Take 1 tablet by mouth At Night As Needed for Sleep.    venlafaxine XR (Effexor XR) 75 MG 24 hr capsule Take 1 capsule by mouth Daily.    acetaminophen-codeine (TYLENOL with CODEINE #3) 300-30 MG per tablet Take 1 tablet by mouth.    azithromycin (ZITHROMAX) 250 MG tablet Take 2 tablets the first day, then 1 tablet daily for 4 days.    estradiol (VAGIFEM) 10 MCG tablet vaginal tablet Insert 1 tablet into the vagina 2 (Two) Times a Week. (Patient not taking: Reported " "on 11/30/2023)    meloxicam (MOBIC) 15 MG tablet Take 1 tablet by mouth Daily.    naloxone (NARCAN) 4 MG/0.1ML nasal spray  (Patient not taking: Reported on 11/30/2023)    Semaglutide,0.25 or 0.5MG/DOS, (OZEMPIC) 2 MG/1.5ML solution pen-injector Inject 0.5 mg under the skin into the appropriate area as directed 1 (One) Time Per Week.     No current facility-administered medications on file prior to visit.          The following portions of the patient's history were reviewed and updated as appropriate: allergies, current medications, past family history, past medical history, past social history, past surgical history and problem list.    Review of Systems   Constitutional:  Positive for activity change, appetite change and fatigue. Negative for fever.   Respiratory:  Negative for cough, shortness of breath and wheezing.    Genitourinary: Negative.    Musculoskeletal:  Positive for back pain.   Neurological:  Negative for dizziness and facial asymmetry.       Objective   Vitals:    11/30/23 1445   Weight: 63.5 kg (140 lb)   Height: 157.5 cm (62\")       BP Readings from Last 3 Encounters:   09/21/23 142/82   03/20/23 120/68   09/19/22 120/64        Wt Readings from Last 3 Encounters:   11/30/23 63.5 kg (140 lb)   09/21/23 69.6 kg (153 lb 6.4 oz)   05/15/23 65.8 kg (145 lb)        Body mass index is 25.61 kg/m².  Nursing notes and vitals reviewed.    Physical Exam  Constitutional:       General: She is not in acute distress.     Appearance: Normal appearance.   HENT:      Head: Normocephalic and atraumatic.      Mouth/Throat:      Mouth: Mucous membranes are moist.   Eyes:      Extraocular Movements: Extraocular movements intact.      Conjunctiva/sclera: Conjunctivae normal.   Pulmonary:      Effort: Pulmonary effort is normal. No respiratory distress.   Musculoskeletal:      Cervical back: Neck supple. No rigidity.   Skin:     Coloration: Skin is not pale.      Findings: No erythema.   Neurological:      General: No " focal deficit present.      Mental Status: She is alert and oriented to person, place, and time.   Psychiatric:         Mood and Affect: Mood normal.         Behavior: Behavior normal.         Recent Results (from the past 672 hour(s))   Pregnancy, Urine -    Collection Time: 11/15/23  6:19 AM    Specimen: Urine   Result Value Ref Range    HCG, Urine QL Negative Negative   MAGNESIUM    Collection Time: 11/16/23  4:49 AM    Specimen: Blood   Result Value Ref Range    Magnesium 2.0 1.5 - 2.1 mg/dL   MAGNESIUM    Collection Time: 11/17/23  4:36 AM    Specimen: Blood   Result Value Ref Range    Magnesium 2.0 1.5 - 2.1 mg/dL   MAGNESIUM    Collection Time: 11/18/23  4:49 AM    Specimen: Blood   Result Value Ref Range    Magnesium 1.8 1.5 - 2.1 mg/dL   POTASSIUM    Collection Time: 11/20/23  3:22 PM    Specimen: Blood   Result Value Ref Range    Potassium 3.7 3.5 - 5.3 meq/L   Urinalysis, Microscopic Only -    Collection Time: 11/27/23  6:45 PM    Specimen: Urine    Specimen type and source: Urine, Urine specimen collection, clean catch (procedure)   Result Value Ref Range    WBC, UA 6-10 (A) 0 - 5 /HPF    RBC, UA 0-5 0-5, 11-15, 16-20, 21-25, 26-30, 31-35, 36-40, 41-50, >50 /HPF    Mucus, UA 1+ (A) None Seen    Squamous Epithelial Cells, UA 6-10 (A) 0 - 5 /HPF         Assessment & Plan   Diagnoses and all orders for this visit:    1. Single subsegmental pulmonary embolism without acute cor pulmonale (Primary)    2. Iron deficiency anemia, unspecified iron deficiency anemia type  -     CBC & Differential; Future  -     Ferritin; Future    3. Hypokalemia  -     Comprehensive Metabolic Panel; Future      Hospital and ER records reviewed.      Continue Eliquis.  F/U with pulmonology next week as scheduled.    Post-op anemia.  Iron-rich diet recommended.  Recheck labs in 2-3 weeks.        Medications, including side effects, were discussed with the patient. Patient verbalized understanding.  The plan of care was discussed.  All questions were answered. Patient verbalized understanding.      Return for Next scheduled follow up.

## 2023-12-01 ENCOUNTER — TELEPHONE (OUTPATIENT)
Dept: INTERNAL MEDICINE | Facility: CLINIC | Age: 63
End: 2023-12-01

## 2023-12-01 NOTE — TELEPHONE ENCOUNTER
Caller: Lilliam Portillo    Relationship: Self    Best call back number: [phone]     Who are you requesting to speak with (clinical staff, provider,  specific staff member): DR WOOD OR MA    What was the call regarding: PATIENT FORGOT TO MENTION DURING HER VIDEO VISIT THAT SHE HAS LOWER ABDOMINAL PAIN. SHE HAS HAD DIVERTICULITIS IN THE PAST AND THE SYMPTOMS FELT THE SAME. REQUESTS A CALL BACK TO DISCUSS FURTHER CONCERNS AND TREATMENT OPTIONS

## 2023-12-04 NOTE — TELEPHONE ENCOUNTER
Yes, I would really like her to be seen in person at urgent care (she lives in [place]).  She just had surgery and needs to be checked.

## 2023-12-04 NOTE — TELEPHONE ENCOUNTER
Spoke with patient- offered to schedule her an appt in office, she declined. She said she will probably go to urgent care but would like the message sent to PCP letting her know the pain is the same as before.

## 2024-03-15 DIAGNOSIS — R10.13 EPIGASTRIC PAIN: ICD-10-CM

## 2024-03-15 RX ORDER — OMEPRAZOLE 40 MG/1
40 CAPSULE, DELAYED RELEASE ORAL DAILY
Qty: 90 CAPSULE | Refills: 1 | Status: SHIPPED | OUTPATIENT
Start: 2024-03-15

## 2024-03-15 NOTE — TELEPHONE ENCOUNTER
Rx Refill Note  Requested Prescriptions     Pending Prescriptions Disp Refills    omeprazole (priLOSEC) 40 MG capsule 90 capsule 1     Sig: Take 1 capsule by mouth Daily.      Last office visit with prescribing clinician: 9/21/2023   Last telemedicine visit with prescribing clinician: 11/30/2023   Next office visit with prescribing clinician: 3/21/2024                         Would you like a call back once the refill request has been completed: [] Yes [] No    If the office needs to give you a call back, can they leave a voicemail: [] Yes [] No    Ericka Fields MA  03/15/24, 08:58 EDT

## 2024-03-26 NOTE — TELEPHONE ENCOUNTER
----- Message from Marisela Jacobs sent at 5/24/2019 10:25 AM EDT -----  Contact: [phone]   for pt to call you back w/ dx.  She didn't leave that on  when she called and it's not on chart sor  ----- Message -----  From: Ynes Win MA  Sent: 5/22/2019   5:09 PM  To: Marisela Jacobs    What diagnosis/symptom?    ----- Message -----  From: Ynes Win MA  Sent: 5/22/2019   5:06 PM  To: Ynes Win MA        ----- Message -----  From: Marisela Jacobs  Sent: 5/22/2019   4:37 PM  To: Joanie Vu [facility] Clinical Pool    PT LM WANTS  REFERRRAL W/ [address] -[phone]---OR AN ASSOC, LOCATED AT [facility]--I WILL NEED AN ORDER TO MAKE THE APPT       Offered and patient declined

## 2024-05-30 RX ORDER — BUPROPION HYDROCHLORIDE 300 MG/1
300 TABLET ORAL DAILY
Qty: 30 TABLET | Refills: 0 | Status: SHIPPED | OUTPATIENT
Start: 2024-05-30

## 2024-05-30 NOTE — TELEPHONE ENCOUNTER
Rx Refill Note  Requested Prescriptions     Pending Prescriptions Disp Refills    buPROPion XL (WELLBUTRIN XL) 300 MG 24 hr tablet 90 tablet 1     Sig: Take 1 tablet by mouth Daily.      Last office visit with prescribing clinician: 9/21/2023   Last telemedicine visit with prescribing clinician: Visit date not found   Next office visit with prescribing clinician: Visit date not found                         Would you like a call back once the refill request has been completed: [] Yes [] No    If the office needs to give you a call back, can they leave a voicemail: [] Yes [] No    Paulo Rodríguez, PCT  05/30/24, 10:59 EDT

## 2024-06-26 RX ORDER — BUPROPION HYDROCHLORIDE 300 MG/1
300 TABLET ORAL DAILY
Qty: 30 TABLET | Refills: 0 | Status: SHIPPED | OUTPATIENT
Start: 2024-06-26

## 2024-06-26 NOTE — TELEPHONE ENCOUNTER
Rx Refill Note  Requested Prescriptions     Pending Prescriptions Disp Refills    buPROPion XL (WELLBUTRIN XL) 300 MG 24 hr tablet 30 tablet 0     Sig: Take 1 tablet by mouth Daily.      Last office visit with prescribing clinician: 9/21/2023   Last telemedicine visit with prescribing clinician: Visit date not found   Next office visit with prescribing clinician: Visit date not found                         Would you like a call back once the refill request has been completed: [] Yes [] No    If the office needs to give you a call back, can they leave a voicemail: [] Yes [] No    Yoselin Hummel CMA  06/26/24, 10:16 EDT

## 2024-06-26 NOTE — TELEPHONE ENCOUNTER
Rx Refill Note  Requested Prescriptions     Pending Prescriptions Disp Refills    buPROPion XL (WELLBUTRIN XL) 300 MG 24 hr tablet 30 tablet 0     Sig: Take 1 tablet by mouth Daily.      Last office visit with prescribing clinician: 9/21/2023   Last telemedicine visit with prescribing clinician: Visit date not found   Next office visit with prescribing clinician: Visit date not found                         Would you like a call back once the refill request has been completed: [] Yes [] No    If the office needs to give you a call back, can they leave a voicemail: [] Yes [] No    BRYCE Rodriguez  06/26/24, 08:04 EDT

## (undated) DEVICE — SPONGE,DRAIN,NONWVN,4"X4",6PLY,STRL,LF: Brand: MEDLINE

## (undated) DEVICE — METZENBAUM SCISSOR TIP, DISPOSABLE: Brand: RENEW

## (undated) DEVICE — LAPAROSCOPIC TROCAR SLEEVE/SINGLE USE: Brand: KII® OPTICAL ACCESS SYSTEM

## (undated) DEVICE — RESERVOIR,SUCTION,100CC,SILICONE: Brand: MEDLINE

## (undated) DEVICE — ENDOPATH 5MM ENDOSCOPIC BLUNT TIP DISSECTORS (12 POUCHES CONTAINING 3 DISSECTORS EACH): Brand: ENDOPATH

## (undated) DEVICE — GLV SURG EUDERMIC PF LTX 8 STRL

## (undated) DEVICE — TUBING, SUCTION, 1/4" X 12', STRAIGHT: Brand: MEDLINE

## (undated) DEVICE — BNDG ADHS PLSTC 1X3IN LF

## (undated) DEVICE — 2, DISPOSABLE SUCTION/IRRIGATOR WITH DISPOSABLE TIP: Brand: STRYKEFLOW

## (undated) DEVICE — Device

## (undated) DEVICE — TP SILK DURAPORE 2 IN

## (undated) DEVICE — SUT ETHIB 0/0 MO6 I8IN CX45D

## (undated) DEVICE — BNDG ADHS CURAD FLX/FABRC 2X4IN STRL LF

## (undated) DEVICE — ENDOPOUCH RETRIEVER SPECIMEN RETRIEVAL BAGS: Brand: ENDOPOUCH RETRIEVER

## (undated) DEVICE — SUT SILK 2/0 FS BLK 18IN 685G

## (undated) DEVICE — SUCTION CANISTER, 1000CC,SAFELINER: Brand: DEROYAL

## (undated) DEVICE — ENDOPATH XCEL BLUNT TIP TROCARS WITH SMOOTH SLEEVES: Brand: ENDOPATH XCEL

## (undated) DEVICE — APPL CHLORAPREP W/TINT 26ML ORNG

## (undated) DEVICE — SUT MNCRYL 4/0 PS2 18 IN

## (undated) DEVICE — TBG INSUFL W FLTR STRL

## (undated) DEVICE — PK LAP GEN 90

## (undated) DEVICE — TROCAR: Brand: KII® SLEEVE